# Patient Record
Sex: FEMALE | Race: WHITE | NOT HISPANIC OR LATINO | Employment: OTHER | URBAN - METROPOLITAN AREA
[De-identification: names, ages, dates, MRNs, and addresses within clinical notes are randomized per-mention and may not be internally consistent; named-entity substitution may affect disease eponyms.]

---

## 2020-02-03 ENCOUNTER — OFFICE VISIT (OUTPATIENT)
Dept: FAMILY MEDICINE CLINIC | Facility: CLINIC | Age: 75
End: 2020-02-03

## 2020-02-03 VITALS
SYSTOLIC BLOOD PRESSURE: 126 MMHG | OXYGEN SATURATION: 98 % | TEMPERATURE: 97.8 F | HEART RATE: 78 BPM | BODY MASS INDEX: 29.44 KG/M2 | HEIGHT: 62 IN | WEIGHT: 160 LBS | DIASTOLIC BLOOD PRESSURE: 78 MMHG

## 2020-02-03 DIAGNOSIS — I10 ESSENTIAL HYPERTENSION: ICD-10-CM

## 2020-02-03 DIAGNOSIS — E78.2 MIXED HYPERLIPIDEMIA: Primary | ICD-10-CM

## 2020-02-03 DIAGNOSIS — T14.8XXA BRUISE: ICD-10-CM

## 2020-02-03 DIAGNOSIS — E55.9 HYPOVITAMINOSIS D: ICD-10-CM

## 2020-02-03 PROBLEM — K58.9 ADAPTIVE COLITIS: Status: ACTIVE | Noted: 2020-02-03

## 2020-02-03 PROBLEM — M19.90 ARTHRITIS: Status: ACTIVE | Noted: 2018-12-03

## 2020-02-03 PROBLEM — L21.9 DERMATITIS SEBORRHEICA: Status: ACTIVE | Noted: 2020-02-03

## 2020-02-03 PROBLEM — F32.A DEPRESSION: Status: ACTIVE | Noted: 2018-12-03

## 2020-02-03 PROBLEM — R10.31 RIGHT GROIN PAIN: Status: ACTIVE | Noted: 2017-04-12

## 2020-02-03 PROBLEM — I67.1 BRAIN ANEURYSM: Status: ACTIVE | Noted: 2020-02-03

## 2020-02-03 PROBLEM — E78.5 HLD (HYPERLIPIDEMIA): Status: ACTIVE | Noted: 2020-02-03

## 2020-02-03 PROBLEM — I34.0 NONRHEUMATIC MITRAL VALVE REGURGITATION: Status: ACTIVE | Noted: 2020-02-03

## 2020-02-03 PROCEDURE — 99204 OFFICE O/P NEW MOD 45 MIN: CPT | Performed by: FAMILY MEDICINE

## 2020-02-03 RX ORDER — ATORVASTATIN CALCIUM 20 MG/1
TABLET, FILM COATED ORAL
COMMUNITY
Start: 2012-09-10 | End: 2020-02-03 | Stop reason: SDUPTHER

## 2020-02-03 RX ORDER — AMLODIPINE BESYLATE 10 MG/1
5 TABLET ORAL 2 TIMES DAILY
COMMUNITY
Start: 2018-12-07 | End: 2020-02-03 | Stop reason: SDUPTHER

## 2020-02-03 RX ORDER — SERTRALINE HYDROCHLORIDE 100 MG/1
TABLET, FILM COATED ORAL
COMMUNITY
Start: 2012-08-28 | End: 2020-09-15 | Stop reason: SDUPTHER

## 2020-02-03 RX ORDER — TRAZODONE HYDROCHLORIDE 100 MG/1
100 TABLET ORAL NIGHTLY
COMMUNITY
Start: 2019-09-18 | End: 2020-03-19 | Stop reason: SDUPTHER

## 2020-02-03 RX ORDER — ATORVASTATIN CALCIUM 20 MG/1
20 TABLET, FILM COATED ORAL DAILY
Qty: 90 TABLET | Refills: 3 | Status: SHIPPED | OUTPATIENT
Start: 2020-02-03 | End: 2021-03-11 | Stop reason: SDUPTHER

## 2020-02-03 RX ORDER — AMLODIPINE BESYLATE 10 MG/1
10 TABLET ORAL DAILY
Qty: 90 TABLET | Refills: 3 | Status: SHIPPED | OUTPATIENT
Start: 2020-02-03 | End: 2021-03-09 | Stop reason: SDUPTHER

## 2020-02-03 RX ORDER — LANOLIN ALCOHOL/MO/W.PET/CERES
1000 CREAM (GRAM) TOPICAL DAILY
COMMUNITY
End: 2021-06-03 | Stop reason: HOSPADM

## 2020-02-03 NOTE — PROGRESS NOTES
Joi Aleman is a 74 y.o. female.     Chief Complaint   Patient presents with   • Establish Care     new pt establishing today with dr araujo   • Knee Problem     left knee bruising for about a year she fell last year on the knee and since has some numbness and bruise       HPI     Pt is a pleasant 74 y.o. YO female here for L knee pain, fell 1 year ago and had bruising and was warm and numb for months.  A couple of episodes of instability.  Current medical problems includes HTN well controlled, HLD well controlled, Depression well controlled with zoloft.    The following portions of the patient's history were reviewed and updated as appropriate: allergies, current medications, past family history, past medical history, past social history, past surgical history and problem list.    Review of Systems   HENT: Negative.    Eyes: Negative.    Respiratory: Negative.    Cardiovascular: Negative for chest pain, palpitations and leg swelling.   Gastrointestinal: Negative.  Negative for abdominal distention, abdominal pain and anal bleeding.   Endocrine: Negative.    Genitourinary: Negative.    Musculoskeletal: Positive for arthralgias.        Right knee bruising    Skin: Negative.  Negative for color change, pallor, rash and wound.   Allergic/Immunologic: Negative for environmental allergies and food allergies.   Hematological: Negative for adenopathy. Does not bruise/bleed easily.   Psychiatric/Behavioral: Negative.  Negative for agitation, behavioral problems and sleep disturbance.       Objective  Vitals:    02/03/20 1417   BP: 126/78   Pulse: 78   Temp: 97.8 °F (36.6 °C)   SpO2: 98%        Physical Exam   Constitutional: She is oriented to person, place, and time. She appears well-developed and well-nourished. No distress.   HENT:   Head: Normocephalic.   Nose: Nose normal.   Eyes: EOM are normal.   Cardiovascular: Normal rate, regular rhythm and intact distal pulses.   Murmur heard.  Pulmonary/Chest: Effort  normal and breath sounds normal. No respiratory distress.   Musculoskeletal: Normal range of motion.   Neurological: She is alert and oriented to person, place, and time.   Skin: Skin is warm and dry. No rash noted.   Psychiatric: She has a normal mood and affect. Her behavior is normal. Judgment and thought content normal.   Nursing note and vitals reviewed.        Current Outpatient Medications:   •  amLODIPine (NORVASC) 10 MG tablet, Take 1 tablet by mouth Daily., Disp: 90 tablet, Rfl: 3  •  atorvastatin (LIPITOR) 20 MG tablet, Take 1 tablet by mouth Daily., Disp: 90 tablet, Rfl: 3  •  MAGNESIUM GLUCONATE PO, Take 27 mg by mouth 2 (Two) Times a Day., Disp: , Rfl:   •  MULTIPLE VITAMINS PO, Take  by mouth., Disp: , Rfl:   •  sertraline (ZOLOFT) 100 MG tablet, TAKE 2 TABLETS EVERY DAY, Disp: , Rfl:   •  traZODone (DESYREL) 100 MG tablet, 100 mg Every Night., Disp: , Rfl:   •  vitamin B-12 (CYANOCOBALAMIN) 1000 MCG tablet, Take 1,000 mcg by mouth Daily., Disp: , Rfl:     Procedures    Lab Results (most recent)     None              Joi was seen today for establish care and knee problem.    Diagnoses and all orders for this visit:    Mixed hyperlipidemia  -     atorvastatin (LIPITOR) 20 MG tablet; Take 1 tablet by mouth Daily.  -     Lipid Panel    Essential hypertension  -     amLODIPine (NORVASC) 10 MG tablet; Take 1 tablet by mouth Daily.  -     Comprehensive Metabolic Panel    Hypovitaminosis D  -     Vitamin D 25 Hydroxy    Bruise      Pleasant 74-year-old female here as a new patient.  Hyperlipidemia well-controlled on atorvastatin recheck levels as above and continue current dose unless labs are significantly different.    Hypertension well-controlled on amlodipine 10 mg daily.    Hypovitaminosis D stable.  Will recheck level today.    Depression well controlled on Zoloft 100 mg daily.  Not needing a refill at this time.    Patient had mechanical fall and bruised her knee, healing well.  Advised warm  compress and gradual increased activity as tolerated.  No other musculoskeletal injuries identified.    Return in about 6 months (around 8/3/2020), or if symptoms worsen or fail to improve, for Medicare Wellness.      Yaneth Leal MD

## 2020-03-06 LAB
25(OH)D3+25(OH)D2 SERPL-MCNC: 34.8 NG/ML (ref 30–100)
ALBUMIN SERPL-MCNC: 4.1 G/DL (ref 3.5–5.2)
ALBUMIN/GLOB SERPL: 2 G/DL
ALP SERPL-CCNC: 88 U/L (ref 39–117)
ALT SERPL-CCNC: 15 U/L (ref 1–33)
AST SERPL-CCNC: 20 U/L (ref 1–32)
BILIRUB SERPL-MCNC: 0.3 MG/DL (ref 0.2–1.2)
BUN SERPL-MCNC: 11 MG/DL (ref 8–23)
BUN/CREAT SERPL: 13.4 (ref 7–25)
CALCIUM SERPL-MCNC: 9.1 MG/DL (ref 8.6–10.5)
CHLORIDE SERPL-SCNC: 100 MMOL/L (ref 98–107)
CHOLEST SERPL-MCNC: 209 MG/DL (ref 0–200)
CO2 SERPL-SCNC: 28.9 MMOL/L (ref 22–29)
CREAT SERPL-MCNC: 0.82 MG/DL (ref 0.57–1)
GLOBULIN SER CALC-MCNC: 2.1 GM/DL
GLUCOSE SERPL-MCNC: 93 MG/DL (ref 65–99)
HDLC SERPL-MCNC: 89 MG/DL (ref 40–60)
LDLC SERPL CALC-MCNC: 107 MG/DL (ref 0–100)
POTASSIUM SERPL-SCNC: 4.5 MMOL/L (ref 3.5–5.2)
PROT SERPL-MCNC: 6.2 G/DL (ref 6–8.5)
SODIUM SERPL-SCNC: 140 MMOL/L (ref 136–145)
TRIGL SERPL-MCNC: 66 MG/DL (ref 0–150)
VLDLC SERPL CALC-MCNC: 13.2 MG/DL

## 2020-03-19 RX ORDER — TRAZODONE HYDROCHLORIDE 100 MG/1
100 TABLET ORAL NIGHTLY
Qty: 90 TABLET | Refills: 2 | Status: SHIPPED | OUTPATIENT
Start: 2020-03-19 | End: 2020-05-10 | Stop reason: SDUPTHER

## 2020-04-21 ENCOUNTER — APPOINTMENT (OUTPATIENT)
Dept: GENERAL RADIOLOGY | Facility: HOSPITAL | Age: 75
End: 2020-04-21

## 2020-04-21 ENCOUNTER — HOSPITAL ENCOUNTER (EMERGENCY)
Facility: HOSPITAL | Age: 75
Discharge: HOME | End: 2020-04-21
Attending: EMERGENCY MEDICINE | Admitting: EMERGENCY MEDICINE

## 2020-04-21 VITALS
OXYGEN SATURATION: 96 % | TEMPERATURE: 99.1 F | RESPIRATION RATE: 18 BRPM | HEIGHT: 62 IN | WEIGHT: 163 LBS | BODY MASS INDEX: 30 KG/M2 | DIASTOLIC BLOOD PRESSURE: 71 MMHG | HEART RATE: 51 BPM | SYSTOLIC BLOOD PRESSURE: 153 MMHG

## 2020-04-21 DIAGNOSIS — R42 DIZZY SPELLS: Primary | ICD-10-CM

## 2020-04-21 LAB
ALBUMIN SERPL-MCNC: 3.7 G/DL (ref 3.5–5.2)
ALBUMIN/GLOB SERPL: 1.7 G/DL
ALP SERPL-CCNC: 75 U/L (ref 39–117)
ALT SERPL W P-5'-P-CCNC: 14 U/L (ref 1–33)
ANION GAP SERPL CALCULATED.3IONS-SCNC: 9.9 MMOL/L (ref 5–15)
AST SERPL-CCNC: 18 U/L (ref 1–32)
BASOPHILS # BLD AUTO: 0.05 10*3/MM3 (ref 0–0.2)
BASOPHILS NFR BLD AUTO: 0.8 % (ref 0–1.5)
BILIRUB SERPL-MCNC: 0.3 MG/DL (ref 0.2–1.2)
BUN BLD-MCNC: 11 MG/DL (ref 8–23)
BUN/CREAT SERPL: 12 (ref 7–25)
CALCIUM SPEC-SCNC: 9.1 MG/DL (ref 8.6–10.5)
CHLORIDE SERPL-SCNC: 102 MMOL/L (ref 98–107)
CO2 SERPL-SCNC: 29.1 MMOL/L (ref 22–29)
CREAT BLD-MCNC: 0.92 MG/DL (ref 0.57–1)
DEPRECATED RDW RBC AUTO: 42.2 FL (ref 37–54)
EOSINOPHIL # BLD AUTO: 0.19 10*3/MM3 (ref 0–0.4)
EOSINOPHIL NFR BLD AUTO: 3.2 % (ref 0.3–6.2)
ERYTHROCYTE [DISTWIDTH] IN BLOOD BY AUTOMATED COUNT: 13 % (ref 12.3–15.4)
GFR SERPL CREATININE-BSD FRML MDRD: 60 ML/MIN/1.73
GLOBULIN UR ELPH-MCNC: 2.2 GM/DL
GLUCOSE BLD-MCNC: 130 MG/DL (ref 65–99)
HCT VFR BLD AUTO: 35 % (ref 34–46.6)
HGB BLD-MCNC: 12.2 G/DL (ref 12–15.9)
IMM GRANULOCYTES # BLD AUTO: 0.01 10*3/MM3 (ref 0–0.05)
IMM GRANULOCYTES NFR BLD AUTO: 0.2 % (ref 0–0.5)
LYMPHOCYTES # BLD AUTO: 1.48 10*3/MM3 (ref 0.7–3.1)
LYMPHOCYTES NFR BLD AUTO: 24.6 % (ref 19.6–45.3)
MAGNESIUM SERPL-MCNC: 2 MG/DL (ref 1.6–2.4)
MCH RBC QN AUTO: 31.2 PG (ref 26.6–33)
MCHC RBC AUTO-ENTMCNC: 34.9 G/DL (ref 31.5–35.7)
MCV RBC AUTO: 89.5 FL (ref 79–97)
MONOCYTES # BLD AUTO: 0.55 10*3/MM3 (ref 0.1–0.9)
MONOCYTES NFR BLD AUTO: 9.2 % (ref 5–12)
NEUTROPHILS # BLD AUTO: 3.73 10*3/MM3 (ref 1.7–7)
NEUTROPHILS NFR BLD AUTO: 62 % (ref 42.7–76)
NRBC BLD AUTO-RTO: 0 /100 WBC (ref 0–0.2)
NT-PROBNP SERPL-MCNC: 306.8 PG/ML (ref 5–900)
PLATELET # BLD AUTO: 196 10*3/MM3 (ref 140–450)
PMV BLD AUTO: 9 FL (ref 6–12)
POTASSIUM BLD-SCNC: 4.1 MMOL/L (ref 3.5–5.2)
PROT SERPL-MCNC: 5.9 G/DL (ref 6–8.5)
RBC # BLD AUTO: 3.91 10*6/MM3 (ref 3.77–5.28)
SODIUM BLD-SCNC: 141 MMOL/L (ref 136–145)
TROPONIN T SERPL-MCNC: <0.01 NG/ML (ref 0–0.03)
WBC NRBC COR # BLD: 6.01 10*3/MM3 (ref 3.4–10.8)

## 2020-04-21 PROCEDURE — 93010 ELECTROCARDIOGRAM REPORT: CPT | Performed by: INTERNAL MEDICINE

## 2020-04-21 PROCEDURE — 96360 HYDRATION IV INFUSION INIT: CPT

## 2020-04-21 PROCEDURE — 99284 EMERGENCY DEPT VISIT MOD MDM: CPT

## 2020-04-21 PROCEDURE — 83735 ASSAY OF MAGNESIUM: CPT | Performed by: EMERGENCY MEDICINE

## 2020-04-21 PROCEDURE — 85025 COMPLETE CBC W/AUTO DIFF WBC: CPT | Performed by: EMERGENCY MEDICINE

## 2020-04-21 PROCEDURE — 71045 X-RAY EXAM CHEST 1 VIEW: CPT

## 2020-04-21 PROCEDURE — 84484 ASSAY OF TROPONIN QUANT: CPT | Performed by: EMERGENCY MEDICINE

## 2020-04-21 PROCEDURE — 83880 ASSAY OF NATRIURETIC PEPTIDE: CPT | Performed by: EMERGENCY MEDICINE

## 2020-04-21 PROCEDURE — 93005 ELECTROCARDIOGRAM TRACING: CPT | Performed by: EMERGENCY MEDICINE

## 2020-04-21 PROCEDURE — 80053 COMPREHEN METABOLIC PANEL: CPT | Performed by: EMERGENCY MEDICINE

## 2020-04-21 RX ADMIN — SODIUM CHLORIDE 500 ML: 9 INJECTION, SOLUTION INTRAVENOUS at 14:24

## 2020-04-21 NOTE — ED PROVIDER NOTES
EMERGENCY DEPARTMENT ENCOUNTER    Room Number:  21/21  Date of encounter:  4/21/2020  PCP: Yaneth Leal MD    HPI:  Context: Joi Aleman is a 74 y.o. female who presents to the ED c/o chief complaint of dizziness.  History provided by patient.  Patient complains of dizziness that has been occurring for the last 2 years.  Patient states dizziness occurs when standing, is improved with sitting.  Patient states that is worsened over the last 3 days.  Patient denies any associated chest pain, no palpitations.  Patient does endorse dyspnea that has been occurring for the last 2 years but no dyspnea specifically associated with dizziness episodes.  Patient states she is sometimes short of breath when standing still, but this is improved with movement.  Patient has no shortness of breath when seated or laying flat.  Patient denies any recent vomiting or diarrhea.  No fever shakes chills or night sweats.  Patient is on antihypertensive medicine, no recent changes.  Patient states she has not followed up with her primary care doctor regarding the symptoms.  Patient does report she has had multiple negative stress tests in the past.  Patient denies any recent fevers, cough, shortness of breath, lost of taste or smell.  Patient denies any recent travel.  No exposure to any known or suspected CoVID-19 infections.      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   • Essential hypertension 12/03/2018   • HLD (hyperlipidemia) 02/03/2020   • Pulmonary nodule 12/08/2016   • Right groin pain 04/12/2017   • Brain aneurysm 02/03/2020   • Nonrheumatic mitral valve regurgitation 02/03/2020     Resolved Ambulatory Problems     Diagnosis Date Noted   • No Resolved Ambulatory Problems     Past Medical History:    Diagnosis Date   • Anxiety    • Hyperlipidemia    • Hypertension    • Insomnia        PAST SURGICAL HISTORY  Past Surgical History:   Procedure Laterality Date   • BLEPHAROPLASTY     • COLON SURGERY     • COLONOSCOPY      normal less than 10 years ago   • HYSTERECTOMY     • TOE SURGERY Right     right big toe replecemnet 12 years ago       FAMILY HISTORY  Family History   Problem Relation Age of Onset   • Breast cancer Mother 76   • Heart disease Father    • Heart attack Father    • Heart disease Paternal Grandfather    • Heart attack Paternal Grandfather    • No Known Problems Sister    • No Known Problems Brother    • Prostate cancer Maternal Grandfather        SOCIAL HISTORY  Social History     Socioeconomic History   • Marital status: Single     Spouse name: Not on file   • Number of children: Not on file   • Years of education: Not on file   • Highest education level: Not on file   Tobacco Use   • Smoking status: Former Smoker     Packs/day: 1.50     Types: Cigarettes     Last attempt to quit: 1975     Years since quittin.3   • Smokeless tobacco: Never Used   Substance and Sexual Activity   • Alcohol use: Yes     Frequency: 2-4 times a month   • Drug use: Never       ALLERGIES  Patient has no known allergies.    The patient's allergies have been reviewed    REVIEW OF SYSTEMS  All systems reviewed and negative except for those discussed in HPI.     PHYSICAL EXAM  I have reviewed the triage vital signs and nursing notes.  ED Triage Vitals [20 1330]   Temp Heart Rate Resp BP SpO2   99.1 °F (37.3 °C) 79 18 -- 96 %      Temp src Heart Rate Source Patient Position BP Location FiO2 (%)   -- -- -- -- --       GENERAL: No acute distress  HENT: NCAT, PERRL, Nares patent  EYES: no scleral icterus  NECK: trachea midline, no ROM limitations  CV: regular rhythm, regular rate, systolic murmur loudest at the right sternal border  RESPIRATORY: normal effort  ABDOMEN: soft  : deferred  MUSCULOSKELETAL: no  deformity  NEURO: Alert and oriented x3, extraocular motion intact, pupils are equal and round reactive to light, cranial nerves II through XII are grossly intact, normal speech, moves all extremities well, 5 out of 5 strength all 4 extremities, sensation intact light touch all 4 extremities, no ataxia  SKIN: warm, dry    LAB RESULTS  Recent Results (from the past 24 hour(s))   Comprehensive Metabolic Panel    Collection Time: 04/21/20  1:59 PM   Result Value Ref Range    Glucose 130 (H) 65 - 99 mg/dL    BUN 11 8 - 23 mg/dL    Creatinine 0.92 0.57 - 1.00 mg/dL    Sodium 141 136 - 145 mmol/L    Potassium 4.1 3.5 - 5.2 mmol/L    Chloride 102 98 - 107 mmol/L    CO2 29.1 (H) 22.0 - 29.0 mmol/L    Calcium 9.1 8.6 - 10.5 mg/dL    Total Protein 5.9 (L) 6.0 - 8.5 g/dL    Albumin 3.70 3.50 - 5.20 g/dL    ALT (SGPT) 14 1 - 33 U/L    AST (SGOT) 18 1 - 32 U/L    Alkaline Phosphatase 75 39 - 117 U/L    Total Bilirubin 0.3 0.2 - 1.2 mg/dL    eGFR Non African Amer 60 (L) >60 mL/min/1.73    Globulin 2.2 gm/dL    A/G Ratio 1.7 g/dL    BUN/Creatinine Ratio 12.0 7.0 - 25.0    Anion Gap 9.9 5.0 - 15.0 mmol/L   BNP    Collection Time: 04/21/20  1:59 PM   Result Value Ref Range    proBNP 306.8 5.0 - 900.0 pg/mL   Troponin    Collection Time: 04/21/20  1:59 PM   Result Value Ref Range    Troponin T <0.010 0.000 - 0.030 ng/mL   Magnesium    Collection Time: 04/21/20  1:59 PM   Result Value Ref Range    Magnesium 2.0 1.6 - 2.4 mg/dL   CBC Auto Differential    Collection Time: 04/21/20  1:59 PM   Result Value Ref Range    WBC 6.01 3.40 - 10.80 10*3/mm3    RBC 3.91 3.77 - 5.28 10*6/mm3    Hemoglobin 12.2 12.0 - 15.9 g/dL    Hematocrit 35.0 34.0 - 46.6 %    MCV 89.5 79.0 - 97.0 fL    MCH 31.2 26.6 - 33.0 pg    MCHC 34.9 31.5 - 35.7 g/dL    RDW 13.0 12.3 - 15.4 %    RDW-SD 42.2 37.0 - 54.0 fl    MPV 9.0 6.0 - 12.0 fL    Platelets 196 140 - 450 10*3/mm3    Neutrophil % 62.0 42.7 - 76.0 %    Lymphocyte % 24.6 19.6 - 45.3 %    Monocyte % 9.2 5.0 -  12.0 %    Eosinophil % 3.2 0.3 - 6.2 %    Basophil % 0.8 0.0 - 1.5 %    Immature Grans % 0.2 0.0 - 0.5 %    Neutrophils, Absolute 3.73 1.70 - 7.00 10*3/mm3    Lymphocytes, Absolute 1.48 0.70 - 3.10 10*3/mm3    Monocytes, Absolute 0.55 0.10 - 0.90 10*3/mm3    Eosinophils, Absolute 0.19 0.00 - 0.40 10*3/mm3    Basophils, Absolute 0.05 0.00 - 0.20 10*3/mm3    Immature Grans, Absolute 0.01 0.00 - 0.05 10*3/mm3    nRBC 0.0 0.0 - 0.2 /100 WBC       I ordered the above labs and reviewed the results.    RADIOLOGY  Xr Chest 1 View    Result Date: 4/21/2020  XR CHEST 1 VW-  HISTORY: Female who is 74 years-old,  dizziness  TECHNIQUE: Frontal view of the chest  COMPARISON: None available  FINDINGS: The heart is enlarged. Aorta is ectatic. Mild central vascular prominence. No focal pulmonary consolidation, pleural effusion, or pneumothorax. No acute osseous process.      No focal pulmonary consolidation. Cardiomegaly with mild central vascular prominence. Ectatic aorta.  This report was finalized on 4/21/2020 2:11 PM by Dr. Brannon Ramsay M.D.        I ordered the above noted radiological studies. I reviewed the images and results. I agree with the radiologist interpretation.    PROCEDURES  Procedures    MEDICATIONS GIVEN IN ER  Medications   sodium chloride 0.9 % bolus 500 mL (500 mL Intravenous New Bag 4/21/20 1424)       PROGRESS, DATA ANALYSIS, CONSULTS, AND MEDICAL DECISION MAKING  A complete history and physical exam have been performed.  All available laboratory and imaging results have been reviewed by myself prior to disposition.  Face mask and gloves were worn throughout the patient encounter, unless additional PPE was worn and specified below. Hand hygiene was performed before entering and after leaving the patient room.  [facility]    ED Course as of Apr 21 1453   Tue Apr 21, 2020   1340 Discussed pertinent information from history and physical exam with patient.  Discussed differential diagnosis.  Discussed plan for ED  evaluation/work-up/treatment.  All questions answered.  Patient is agreeable with plan.        [JG]   1351 Patient has cardiac murmur, states she is aware of it has been evaluated in the past.  No stress test or echo available for review.    [JG]   1354 Record review: Last echo in [facility] 6/7/2016.  Echo showed normal systolic function, trace aortic regurgitation, mild mitral regurgitation, mild tricuspid regurgitation, borderline pulmonary hypertension.    [JG]   1419 EKG independently viewed and contemporaneously interpreted by ED physician. Time: 1403.  Rate 58.  Interpretation: Normal sinus rhythm, normal axis, first-degree AV block, normal QRS, no acute ST changes.    [JG]   1423 Orthostatic negative.    [JG]   1450 The patient was reexamined.  They have had symptomatic improvement during their ED stay.  I discussed today's findings with the patient, explaining the pertinent positives and negatives from today's visit, and the plan of care.  Discussed plan for discharge as there is no emergent indication for admission.  Discussed limitation of the ED work-up and that this is to rule out life-threatening emergencies but that they could require further testing as determined by their primary care and or any referred specialist patient is agreeable and understands need for follow-up and repeat exam/testing.  Patient is aware that discharge does not mean there is nothing wrong, indicates no emergency is present, and that they must continue their care with their primary care physician and/or any referred specialist.  They were given appropriate follow-up with their primary care physician and/or specialist.  I had an extensive discussion on the expected clinical course and return precautions.  Patient understands to return to the emergency department for continuation, worsening, or new symptoms.  I answered any of the patient's questions. Patient was discharged home in a stable condition.        [JG]      ED  Course User Index  [JG] Patrick Romero MD       AS OF 14:53 VITALS:    BP - 137/88  HR - 57  TEMP - 99.1 °F (37.3 °C)  O2 SATS - 97%    DIAGNOSIS  Final diagnoses:   Dizzy spells         DISPOSITION  DISCHARGE    Patient discharged in stable condition.    Reviewed implications of results, diagnosis, meds, responsibility to follow up, warning signs and symptoms of possible worsening, potential complications and reasons to return to ER.    Patient/Family voiced understanding of above instructions.    Discussed plan for discharge, as there is no emergent indication for admission. Patient referred to primary care provider for BP management due to today's BP. Pt/family is agreeable and understands need for follow up and repeat testing.  Pt is aware that discharge does not mean that nothing is wrong but it indicates no emergency is present that requires admission and they must continue care with follow-up as given below or physician of their choice.     FOLLOW-UP  Yaneth Leal MD  [address] [facility] [id number] [phone]    Schedule an appointment as soon as possible for a visit in 2 days  even if well    your cardiologist    Schedule an appointment as soon as possible for a visit in 2 days      Belia Durán MD  [address] [id number] [phone]    Schedule an appointment as soon as possible for a visit   if you are unable to follow up with your cardiologist         Medication List      No changes were made to your prescriptions during this visit.        Patrick Romero MD  04/21/20 [id number]

## 2020-04-21 NOTE — ED NOTES
"Pt comes to ER for near syncopal episodes x2 years but worsening over the past 3 days. Pt states she will get \"really dizzy and feel like I'm going to pass out so I have to sit down.\" Pt states she is fine upon initially standing but is worse with exertion. Denies any BP medication changes     Marcelle Villarreal RN  04/21/20 [id number]    "

## 2020-04-23 NOTE — PROGRESS NOTES
RM:________    Referral Provider: No ref. provider found Yaneth Leal MD    PREVIOUS CARDIOLOGIST:   CARDIAC TESTING:     : 1945   AGE: 74 y.o.    2020  REASON FOR VISIT/  CC: ER FOLLOW UP 20      WT: ____________ BP: __________L __________R/ HR_______________    CHEST PAIN: _____________    SOA: ____________PALPS: __________      LIGHTHEADED: ___________ FATIGUE: _______________ EDEMA______________  ALLERGIES:  Patient has no known allergies.  SMOKING HISTORY  Social History     Tobacco Use   • Smoking status: Former Smoker     Packs/day: 1.50     Types: Cigarettes     Last attempt to quit: 1975     Years since quittin.3   • Smokeless tobacco: Never Used   Substance Use Topics   • Alcohol use: Yes     Frequency: 2-4 times a month   • Drug use: Never     Single     CHILDREN: _______________________       CAFFEINE USE________  ALCOHOL _____________  OCCUPATION_____________  Past Surgical History:   Procedure Laterality Date   • BLEPHAROPLASTY     • COLON SURGERY     • COLONOSCOPY      normal less than 10 years ago   • HYSTERECTOMY     • TOE SURGERY Right     right big toe replecemnet 12 years ago          FAMILY HISTORY  HEART DISEASE  CHF  DIABETES  CARDIAC ARREST  STROKE  CANCER  ANEURYSM                                                                                                                     Past Medical History:   Diagnosis Date   • Anxiety    • Arthritis    • Brain aneurysm    • Depression    • Hyperlipidemia    • Hypertension    • Insomnia          H/O: MI_____   STROKE________   GOUT_____   ANEMIA______     CAROTID________ HIV____ CAD_______ HYPERCHOL _____    H/O: CHF _____   RF____ DM___ HTN_______PVD____THYROID DISEASE_______    PMH: GI ____   HEPATITIS ___ KIDNEY DISEASE ___ LUNG DISEASE _______     SLEEP APNEA ____ BLOOD CLOTS ____ DVT ____ VEIN STRIPPING ___________     CANCER _________________________________ CHEMO/ RADIATION__________

## 2020-04-23 NOTE — PROGRESS NOTES
Date of Office Visit: 20  Encounter Provider: Thomas Estrada MD  Place of Service: [facility] CARDIOLOGY  Patient Name: Joi Aleman  :1945    Chief Complaint   Patient presents with   • Chest Pain   :     HPI:     Ms. Aleman is 74 y.o. and presents today to be evaluated after a recent ED visit.  She has hypertension, treated with amlodipine.  She has a history of a heart murmur and had an essentially normal echo at [place] in 2016. She had a normal treadmill stress test in .  She had a normal perfusion stress at [place] in 2016.      The primary reason for her ED visit was dizziness.  Her labs and EKG were unremarkable.  She was not orthostatic.      For the last 4-5 years, she's had progressive symptoms.  She reports generalized fatigue that is severe. She feels weak after she's been standing for a while.  She does not feel lightheaded; she just feels so weak all over that she could collapse. She reports one episode of chest discomfort that felt vice-like, and occurred at rest.  She has not had any exertional chest pain.  She reports dyspnea with exertion.  She denies lightheadedness or syncope.  She denies PND or orthopnea.  She denies leg swelling or palpitations.     She smoked in the remote past but quit in .  Her father had some type of heart disease but she doesn't know the details; he  at 72.      Past Medical History:   Diagnosis Date   • Anxiety    • Arthritis    • Brain aneurysm    • Depression    • Hyperlipidemia    • Hypertension    • Insomnia        Past Surgical History:   Procedure Laterality Date   • BLEPHAROPLASTY     • CARPAL TUNNEL RELEASE Right    • COLON SURGERY     • COLONOSCOPY      normal less than 10 years ago   • HYSTERECTOMY     • TOE SURGERY Right     right big toe replecemnet 12 years ago   • TONSILLECTOMY         Social History     Socioeconomic History   • Marital status: Single     Spouse name: Not on file   • Number of  children: 2   • Years of education: Not on file   • Highest education level: Not on file   Occupational History   • Occupation: retired    • Occupation: property closing    Tobacco Use   • Smoking status: Former Smoker     Packs/day: 1.50     Types: Cigarettes     Last attempt to quit:      Years since quittin.3   • Smokeless tobacco: Never Used   Substance and Sexual Activity   • Alcohol use: Yes     Frequency: 2-4 times a month   • Drug use: Never     Family History   Problem Relation Age of Onset   • Breast cancer Mother 76   • Heart disease Father    • Heart attack Father    • Heart disease Paternal Grandfather    • Heart attack Paternal Grandfather    • No Known Problems Sister    • No Known Problems Brother    • Prostate cancer Maternal Grandfather    • Heart disease Sister      Review of Systems   HENT: Negative for ear pain and sore throat.    Cardiovascular: Positive for chest pain. Negative for leg swelling, paroxysmal nocturnal dyspnea and syncope.   Respiratory: Negative for hemoptysis and sputum production.    Skin: Negative for rash.   Musculoskeletal: Negative for neck pain.   Gastrointestinal: Negative for abdominal pain and vomiting.   Neurological: Negative for headaches.     No Known Allergies      Current Outpatient Medications:   •  amLODIPine (NORVASC) 10 MG tablet, Take 1 tablet by mouth Daily., Disp: 90 tablet, Rfl: 3  •  atorvastatin (LIPITOR) 20 MG tablet, Take 1 tablet by mouth Daily., Disp: 90 tablet, Rfl: 3  •  MAGNESIUM GLUCONATE PO, Take 27 mg by mouth 2 (Two) Times a Day., Disp: , Rfl:   •  MULTIPLE VITAMINS PO, Take  by mouth., Disp: , Rfl:   •  sertraline (ZOLOFT) 100 MG tablet, TAKE 2 TABLETS EVERY DAY, Disp: , Rfl:   •  traZODone (DESYREL) 100 MG tablet, Take 1 tablet by mouth Every Night., Disp: 90 tablet, Rfl: 2  •  vitamin B-12 (CYANOCOBALAMIN) 1000 MCG tablet, Take 1,000 mcg by mouth Daily., Disp: , Rfl:       Objective:     Vitals:    20 1122   BP: 136/68   BP  "Location: Left arm   Pulse: 51   Weight: 75.3 kg (166 lb)   Height: 157.5 cm (62\")     Body mass index is 30.36 kg/m².    Physical Exam   Constitutional: She is oriented to person, place, and time. She appears well-developed and well-nourished.   HENT:   Head: Normocephalic.   Nose: Nose normal.   Mouth/Throat: Oropharynx is clear and moist.   Eyes: Pupils are equal, round, and reactive to light. Conjunctivae and EOM are normal.   Neck: Normal range of motion. No JVD present.   Cardiovascular: Regular rhythm, normal heart sounds and intact distal pulses. Bradycardia present.   No murmur heard.  Pulmonary/Chest: Effort normal and breath sounds normal.   Abdominal: Soft. There is no tenderness.   Musculoskeletal: Normal range of motion. She exhibits no edema.   Lymphadenopathy:     She has no cervical adenopathy.   Neurological: She is alert and oriented to person, place, and time. No cranial nerve deficit.   Skin: Skin is warm and dry. No rash noted.   Psychiatric: She has a normal mood and affect. Her behavior is normal. Judgment and thought content normal.   Vitals reviewed.        ECG 12 Lead  Date/Time: 4/27/2020 11:29 AM  Performed by: Thomas Estrada MD  Authorized by: Thomas Estrada MD   Comparison: compared with previous ECG   Similar to previous ECG  Rhythm: sinus rhythm  Conduction: conduction normal  ST Segments: ST segments normal  T Waves: T waves normal  QRS axis: normal  Other: no other findings    Clinical impression: normal ECG            Assessment:       Diagnosis Plan   1. Generalized weakness     2. Fatigue, unspecified type  Adult Transthoracic Echo Complete W/ Cont if Necessary Per Protocol   3. Chest discomfort  Adult Transthoracic Echo Complete W/ Cont if Necessary Per Protocol   4. Shortness of breath  Adult Transthoracic Echo Complete W/ Cont if Necessary Per Protocol   5. Essential hypertension  ECG 12 Lead   6. Heart murmur  Adult Transesophageal Echo (RICARDA) W/ Cont if Necessary Per " Protocol   7. Abnormal findings on diagnostic imaging of heart and coronary circulation   Adult Transesophageal Echo (RICARDA) W/ Cont if Necessary Per Protocol        Plan:       Ms Aleman has a significant murmur suggestive of mitral regurgitation.  She reports nearly debilitating symptoms of exertional fatigue and dyspnea.  I got a TTE on her in the office; all chamber sizes are normal and she has normal left atrial volume.  In certain views, the mitral valve appears normal.  Then, in others, I can see what appears to be a torn chord and potentially a prolapsing segment of the posterior leaflet.  There's mild-moderate mitral regurgitation, but this could be underestimated.      Given the severity of her symptoms, and the prominence of her murmur without a clear diagnosis by TTE, I think we should proceed with a RICARDA.  If that confirms a diagnosis of MVP/MR, she'll need a right/left heart cath.  If it doesn't, then I will get a CTA of the chest to exclude a thoracic AVM.     Sincerely,       Thomas Estrada MD                Answers for HPI/ROS submitted by the patient on 4/23/2020   Shortness of breath  What is the primary reason for your visit?: Shortness of Breath  Chronicity: chronic  Onset: more than 1 year ago  Frequency: every few minutes  Progression since onset: gradually worsening  Episode duration: 10 minutes  coryza: No  Aggravating factors: any activity

## 2020-04-27 ENCOUNTER — HOSPITAL ENCOUNTER (OUTPATIENT)
Dept: CARDIOLOGY | Facility: HOSPITAL | Age: 75
Discharge: HOME | End: 2020-04-27
Admitting: INTERNAL MEDICINE

## 2020-04-27 ENCOUNTER — TELEPHONE (OUTPATIENT)
Dept: CARDIOLOGY | Facility: CLINIC | Age: 75
End: 2020-04-27

## 2020-04-27 ENCOUNTER — OFFICE VISIT (OUTPATIENT)
Dept: CARDIOLOGY | Facility: CLINIC | Age: 75
End: 2020-04-27

## 2020-04-27 VITALS
OXYGEN SATURATION: 93 % | BODY MASS INDEX: 30.55 KG/M2 | HEART RATE: 71 BPM | WEIGHT: 166 LBS | HEIGHT: 62 IN | SYSTOLIC BLOOD PRESSURE: 138 MMHG | DIASTOLIC BLOOD PRESSURE: 68 MMHG

## 2020-04-27 VITALS
WEIGHT: 166 LBS | SYSTOLIC BLOOD PRESSURE: 136 MMHG | DIASTOLIC BLOOD PRESSURE: 68 MMHG | BODY MASS INDEX: 30.55 KG/M2 | HEIGHT: 62 IN | HEART RATE: 51 BPM

## 2020-04-27 DIAGNOSIS — R53.83 FATIGUE, UNSPECIFIED TYPE: ICD-10-CM

## 2020-04-27 DIAGNOSIS — R93.1 ABNORMAL FINDINGS ON DIAGNOSTIC IMAGING OF HEART AND CORONARY CIRCULATION: ICD-10-CM

## 2020-04-27 DIAGNOSIS — R01.1 HEART MURMUR: ICD-10-CM

## 2020-04-27 DIAGNOSIS — R06.02 SHORTNESS OF BREATH: ICD-10-CM

## 2020-04-27 DIAGNOSIS — R07.89 CHEST DISCOMFORT: ICD-10-CM

## 2020-04-27 DIAGNOSIS — I10 ESSENTIAL HYPERTENSION: ICD-10-CM

## 2020-04-27 DIAGNOSIS — R53.1 GENERALIZED WEAKNESS: Primary | ICD-10-CM

## 2020-04-27 PROBLEM — I67.1 BRAIN ANEURYSM: Status: RESOLVED | Noted: 2020-02-03 | Resolved: 2020-04-27

## 2020-04-27 LAB
AORTIC ARCH: 2.6 CM
AORTIC ROOT ANNULUS: 1.9 CM
ASCENDING AORTA: 2.9 CM
BH CV ECHO MEAS - ACS: 1.7 CM
BH CV ECHO MEAS - AI DEC SLOPE: 152.7 CM/SEC^2
BH CV ECHO MEAS - AI MAX PG: 58.7 MMHG
BH CV ECHO MEAS - AI MAX VEL: 382.9 CM/SEC
BH CV ECHO MEAS - AI P1/2T: 734.7 MSEC
BH CV ECHO MEAS - AO MAX PG (FULL): 7.8 MMHG
BH CV ECHO MEAS - AO MAX PG: 16 MMHG
BH CV ECHO MEAS - AO MEAN PG (FULL): 3.8 MMHG
BH CV ECHO MEAS - AO MEAN PG: 9 MMHG
BH CV ECHO MEAS - AO ROOT AREA (BSA CORRECTED): 1.5
BH CV ECHO MEAS - AO ROOT AREA: 5.7 CM^2
BH CV ECHO MEAS - AO ROOT DIAM: 2.7 CM
BH CV ECHO MEAS - AO V2 MAX: 200 CM/SEC
BH CV ECHO MEAS - AO V2 MEAN: 141.8 CM/SEC
BH CV ECHO MEAS - AO V2 VTI: 52.9 CM
BH CV ECHO MEAS - ASC AORTA: 2.9 CM
BH CV ECHO MEAS - AVA(I,A): 2.3 CM^2
BH CV ECHO MEAS - AVA(I,D): 2.3 CM^2
BH CV ECHO MEAS - AVA(V,A): 2.2 CM^2
BH CV ECHO MEAS - AVA(V,D): 2.2 CM^2
BH CV ECHO MEAS - BSA(HAYCOCK): 1.8 M^2
BH CV ECHO MEAS - BSA: 1.8 M^2
BH CV ECHO MEAS - BZI_BMI: 30.4 KILOGRAMS/M^2
BH CV ECHO MEAS - BZI_METRIC_HEIGHT: 157.5 CM
BH CV ECHO MEAS - BZI_METRIC_WEIGHT: 75.3 KG
BH CV ECHO MEAS - EDV(MOD-SP2): 70 ML
BH CV ECHO MEAS - EDV(MOD-SP4): 51 ML
BH CV ECHO MEAS - EDV(TEICH): 77.3 ML
BH CV ECHO MEAS - EF(CUBED): 78.3 %
BH CV ECHO MEAS - EF(MOD-BP): 71 %
BH CV ECHO MEAS - EF(MOD-SP2): 71.4 %
BH CV ECHO MEAS - EF(MOD-SP4): 72.5 %
BH CV ECHO MEAS - EF(TEICH): 70.9 %
BH CV ECHO MEAS - ESV(MOD-SP2): 20 ML
BH CV ECHO MEAS - ESV(MOD-SP4): 14 ML
BH CV ECHO MEAS - ESV(TEICH): 22.5 ML
BH CV ECHO MEAS - FS: 39.9 %
BH CV ECHO MEAS - IVS/LVPW: 0.98
BH CV ECHO MEAS - IVSD: 1.2 CM
BH CV ECHO MEAS - LAT PEAK E' VEL: 8 CM/SEC
BH CV ECHO MEAS - LV DIASTOLIC VOL/BSA (35-75): 28.9 ML/M^2
BH CV ECHO MEAS - LV MASS(C)D: 177.7 GRAMS
BH CV ECHO MEAS - LV MASS(C)DI: 100.6 GRAMS/M^2
BH CV ECHO MEAS - LV MAX PG: 8.3 MMHG
BH CV ECHO MEAS - LV MEAN PG: 5.2 MMHG
BH CV ECHO MEAS - LV SYSTOLIC VOL/BSA (12-30): 7.9 ML/M^2
BH CV ECHO MEAS - LV V1 MAX: 143.7 CM/SEC
BH CV ECHO MEAS - LV V1 MEAN: 108.7 CM/SEC
BH CV ECHO MEAS - LV V1 VTI: 39.1 CM
BH CV ECHO MEAS - LVIDD: 4.2 CM
BH CV ECHO MEAS - LVIDS: 2.5 CM
BH CV ECHO MEAS - LVLD AP2: 7.7 CM
BH CV ECHO MEAS - LVLD AP4: 6.7 CM
BH CV ECHO MEAS - LVLS AP2: 5.9 CM
BH CV ECHO MEAS - LVLS AP4: 5.2 CM
BH CV ECHO MEAS - LVOT AREA (M): 3.1 CM^2
BH CV ECHO MEAS - LVOT AREA: 3.1 CM^2
BH CV ECHO MEAS - LVOT DIAM: 2 CM
BH CV ECHO MEAS - LVPWD: 1.2 CM
BH CV ECHO MEAS - MED PEAK E' VEL: 5 CM/SEC
BH CV ECHO MEAS - MR MAX PG: 59.2 MMHG
BH CV ECHO MEAS - MR MAX VEL: 384.8 CM/SEC
BH CV ECHO MEAS - MV A DUR: 0.11 SEC
BH CV ECHO MEAS - MV A MAX VEL: 115.7 CM/SEC
BH CV ECHO MEAS - MV DEC SLOPE: 433.7 CM/SEC^2
BH CV ECHO MEAS - MV DEC TIME: 0.21 SEC
BH CV ECHO MEAS - MV E MAX VEL: 96.1 CM/SEC
BH CV ECHO MEAS - MV E/A: 0.83
BH CV ECHO MEAS - MV MAX PG: 5.5 MMHG
BH CV ECHO MEAS - MV MEAN PG: 1.5 MMHG
BH CV ECHO MEAS - MV P1/2T MAX VEL: 104.5 CM/SEC
BH CV ECHO MEAS - MV P1/2T: 70.6 MSEC
BH CV ECHO MEAS - MV V2 MAX: 117.4 CM/SEC
BH CV ECHO MEAS - MV V2 MEAN: 54 CM/SEC
BH CV ECHO MEAS - MV V2 VTI: 47.6 CM
BH CV ECHO MEAS - MVA P1/2T LCG: 2.1 CM^2
BH CV ECHO MEAS - MVA(P1/2T): 3.1 CM^2
BH CV ECHO MEAS - MVA(VTI): 2.5 CM^2
BH CV ECHO MEAS - PA ACC TIME: 0.11 SEC
BH CV ECHO MEAS - PA MAX PG (FULL): 1.6 MMHG
BH CV ECHO MEAS - PA MAX PG: 3.1 MMHG
BH CV ECHO MEAS - PA PR(ACCEL): 28.3 MMHG
BH CV ECHO MEAS - PA V2 MAX: 88.2 CM/SEC
BH CV ECHO MEAS - PI END-D VEL: 74.7 CM/SEC
BH CV ECHO MEAS - PULM A REVS DUR: 0.08 SEC
BH CV ECHO MEAS - PULM A REVS VEL: 36 CM/SEC
BH CV ECHO MEAS - PULM DIAS VEL: 33.8 CM/SEC
BH CV ECHO MEAS - PULM S/D: 1.2
BH CV ECHO MEAS - PULM SYS VEL: 42 CM/SEC
BH CV ECHO MEAS - PVA(V,A): 1.8 CM^2
BH CV ECHO MEAS - PVA(V,D): 1.8 CM^2
BH CV ECHO MEAS - QP/QS: 0.37
BH CV ECHO MEAS - RAP SYSTOLE: 3 MMHG
BH CV ECHO MEAS - RV MAX PG: 1.5 MMHG
BH CV ECHO MEAS - RV MEAN PG: 1 MMHG
BH CV ECHO MEAS - RV V1 MAX: 61.3 CM/SEC
BH CV ECHO MEAS - RV V1 MEAN: 49.2 CM/SEC
BH CV ECHO MEAS - RV V1 VTI: 16.6 CM
BH CV ECHO MEAS - RVOT AREA: 2.7 CM^2
BH CV ECHO MEAS - RVOT DIAM: 1.8 CM
BH CV ECHO MEAS - RVSP: 27 MMHG
BH CV ECHO MEAS - SI(AO): 169.7 ML/M^2
BH CV ECHO MEAS - SI(CUBED): 32.2 ML/M^2
BH CV ECHO MEAS - SI(LVOT): 67.5 ML/M^2
BH CV ECHO MEAS - SI(MOD-SP2): 28.3 ML/M^2
BH CV ECHO MEAS - SI(MOD-SP4): 21 ML/M^2
BH CV ECHO MEAS - SI(TEICH): 31 ML/M^2
BH CV ECHO MEAS - SUP REN AO DIAM: 2.5 CM
BH CV ECHO MEAS - SV(AO): 299.7 ML
BH CV ECHO MEAS - SV(CUBED): 56.8 ML
BH CV ECHO MEAS - SV(LVOT): 119.1 ML
BH CV ECHO MEAS - SV(MOD-SP2): 50 ML
BH CV ECHO MEAS - SV(MOD-SP4): 37 ML
BH CV ECHO MEAS - SV(RVOT): 44.1 ML
BH CV ECHO MEAS - SV(TEICH): 54.8 ML
BH CV ECHO MEAS - TAPSE (>1.6): 2.2 CM2
BH CV ECHO MEAS - TR MAX VEL: 247 CM/SEC
BH CV ECHO MEASUREMENTS AVERAGE E/E' RATIO: 14.78
BH CV VAS BP RIGHT ARM: NORMAL MMHG
BH CV XLRA - RV BASE: 2.9 CM
BH CV XLRA - RV LENGTH: 6.3 CM
BH CV XLRA - RV MID: 2.8 CM
BH CV XLRA - TDI S': 9 CM/SEC
LEFT ATRIUM VOLUME INDEX: 24 ML/M2
MAXIMAL PREDICTED HEART RATE: 148 BPM
SINUS: 2.9 CM
STJ: 3 CM
STRESS TARGET HR: 126 BPM

## 2020-04-27 PROCEDURE — 99204 OFFICE O/P NEW MOD 45 MIN: CPT | Performed by: INTERNAL MEDICINE

## 2020-04-27 PROCEDURE — 93306 TTE W/DOPPLER COMPLETE: CPT | Performed by: INTERNAL MEDICINE

## 2020-04-27 PROCEDURE — 93306 TTE W/DOPPLER COMPLETE: CPT

## 2020-04-27 PROCEDURE — 93000 ELECTROCARDIOGRAM COMPLETE: CPT | Performed by: INTERNAL MEDICINE

## 2020-04-27 NOTE — TELEPHONE ENCOUNTER
4/27 Pt is scheduled for RICARDA with you Wednesday 4/29 in the Cath Lab @ 830am.    Thank you    Dejan DIANE

## 2020-04-29 ENCOUNTER — HOSPITAL ENCOUNTER (OUTPATIENT)
Dept: CARDIOLOGY | Facility: HOSPITAL | Age: 75
Discharge: HOME | End: 2020-04-29
Admitting: INTERNAL MEDICINE

## 2020-04-29 VITALS
DIASTOLIC BLOOD PRESSURE: 71 MMHG | HEIGHT: 62 IN | RESPIRATION RATE: 16 BRPM | WEIGHT: 166 LBS | HEART RATE: 54 BPM | BODY MASS INDEX: 30.55 KG/M2 | SYSTOLIC BLOOD PRESSURE: 135 MMHG | TEMPERATURE: 98.6 F | OXYGEN SATURATION: 92 %

## 2020-04-29 DIAGNOSIS — R93.1 ABNORMAL FINDINGS ON DIAGNOSTIC IMAGING OF HEART AND CORONARY CIRCULATION: ICD-10-CM

## 2020-04-29 DIAGNOSIS — R01.1 HEART MURMUR: ICD-10-CM

## 2020-04-29 LAB
BH CV ECHO MEAS - BSA(HAYCOCK): 1.8 M^2
BH CV ECHO MEAS - BSA: 1.8 M^2
BH CV ECHO MEAS - BZI_BMI: 30.4 KILOGRAMS/M^2
BH CV ECHO MEAS - BZI_METRIC_HEIGHT: 157.5 CM
BH CV ECHO MEAS - BZI_METRIC_WEIGHT: 75.3 KG
BH CV VAS BP LEFT ARM: NORMAL MMHG
LV EF 2D ECHO EST: 65 %

## 2020-04-29 PROCEDURE — 93325 DOPPLER ECHO COLOR FLOW MAPG: CPT | Performed by: INTERNAL MEDICINE

## 2020-04-29 PROCEDURE — 76376 3D RENDER W/INTRP POSTPROCES: CPT | Performed by: INTERNAL MEDICINE

## 2020-04-29 PROCEDURE — 76376 3D RENDER W/INTRP POSTPROCES: CPT

## 2020-04-29 PROCEDURE — 93325 DOPPLER ECHO COLOR FLOW MAPG: CPT

## 2020-04-29 PROCEDURE — 93320 DOPPLER ECHO COMPLETE: CPT | Performed by: INTERNAL MEDICINE

## 2020-04-29 PROCEDURE — 93312 ECHO TRANSESOPHAGEAL: CPT | Performed by: INTERNAL MEDICINE

## 2020-04-29 PROCEDURE — 93320 DOPPLER ECHO COMPLETE: CPT

## 2020-04-29 PROCEDURE — 25010000002 FENTANYL CITRATE (PF) 100 MCG/2ML SOLUTION: Performed by: INTERNAL MEDICINE

## 2020-04-29 PROCEDURE — 25010000002 MIDAZOLAM PER 1 MG: Performed by: INTERNAL MEDICINE

## 2020-04-29 PROCEDURE — 93312 ECHO TRANSESOPHAGEAL: CPT

## 2020-04-29 RX ORDER — FENTANYL CITRATE 50 UG/ML
INJECTION, SOLUTION INTRAMUSCULAR; INTRAVENOUS
Status: COMPLETED | OUTPATIENT
Start: 2020-04-29 | End: 2020-04-29

## 2020-04-29 RX ORDER — LOPERAMIDE HYDROCHLORIDE 2 MG/1
8 CAPSULE ORAL DAILY
COMMUNITY
End: 2020-07-07 | Stop reason: SDUPTHER

## 2020-04-29 RX ORDER — MIDAZOLAM HYDROCHLORIDE 1 MG/ML
INJECTION INTRAMUSCULAR; INTRAVENOUS
Status: COMPLETED | OUTPATIENT
Start: 2020-04-29 | End: 2020-04-29

## 2020-04-29 RX ORDER — SODIUM CHLORIDE 9 MG/ML
75 INJECTION, SOLUTION INTRAVENOUS CONTINUOUS
Status: DISCONTINUED | OUTPATIENT
Start: 2020-04-29 | End: 2020-04-30 | Stop reason: HOSPADM

## 2020-04-29 RX ORDER — SODIUM CHLORIDE 0.9 % (FLUSH) 0.9 %
10 SYRINGE (ML) INJECTION AS NEEDED
Status: DISCONTINUED | OUTPATIENT
Start: 2020-04-29 | End: 2020-04-29 | Stop reason: HOSPADM

## 2020-04-29 RX ORDER — LIDOCAINE HYDROCHLORIDE 10 MG/ML
0.1 INJECTION, SOLUTION EPIDURAL; INFILTRATION; INTRACAUDAL; PERINEURAL ONCE AS NEEDED
Status: DISCONTINUED | OUTPATIENT
Start: 2020-04-29 | End: 2020-04-29 | Stop reason: HOSPADM

## 2020-04-29 RX ORDER — SODIUM CHLORIDE 0.9 % (FLUSH) 0.9 %
3 SYRINGE (ML) INJECTION EVERY 12 HOURS SCHEDULED
Status: DISCONTINUED | OUTPATIENT
Start: 2020-04-29 | End: 2020-04-29 | Stop reason: HOSPADM

## 2020-04-29 RX ADMIN — MIDAZOLAM 1 MG: 1 INJECTION INTRAMUSCULAR; INTRAVENOUS at 08:40

## 2020-04-29 RX ADMIN — MIDAZOLAM 1 MG: 1 INJECTION INTRAMUSCULAR; INTRAVENOUS at 08:43

## 2020-04-29 RX ADMIN — FENTANYL CITRATE 25 MCG: 50 INJECTION INTRAMUSCULAR; INTRAVENOUS at 08:43

## 2020-04-29 RX ADMIN — FENTANYL CITRATE 25 MCG: 50 INJECTION INTRAMUSCULAR; INTRAVENOUS at 08:39

## 2020-04-29 RX ADMIN — FENTANYL CITRATE 25 MCG: 50 INJECTION INTRAMUSCULAR; INTRAVENOUS at 08:50

## 2020-04-29 RX ADMIN — SODIUM CHLORIDE 75 ML/HR: 9 INJECTION, SOLUTION INTRAVENOUS at 08:04

## 2020-04-29 RX ADMIN — FENTANYL CITRATE 25 MCG: 50 INJECTION INTRAMUSCULAR; INTRAVENOUS at 09:07

## 2020-04-29 RX ADMIN — MIDAZOLAM 1 MG: 1 INJECTION INTRAMUSCULAR; INTRAVENOUS at 08:59

## 2020-04-29 RX ADMIN — MIDAZOLAM 1 MG: 1 INJECTION INTRAMUSCULAR; INTRAVENOUS at 08:50

## 2020-04-29 RX ADMIN — MIDAZOLAM 1 MG: 1 INJECTION INTRAMUSCULAR; INTRAVENOUS at 08:46

## 2020-04-29 RX ADMIN — FENTANYL CITRATE 25 MCG: 50 INJECTION INTRAMUSCULAR; INTRAVENOUS at 09:12

## 2020-04-30 DIAGNOSIS — R01.1 HEART MURMUR: Primary | ICD-10-CM

## 2020-04-30 DIAGNOSIS — R94.39 ABNORMAL RESULT OF OTHER CARDIOVASCULAR FUNCTION STUDY: ICD-10-CM

## 2020-05-06 ENCOUNTER — HOSPITAL ENCOUNTER (OUTPATIENT)
Dept: CT IMAGING | Facility: HOSPITAL | Age: 75
Discharge: HOME | End: 2020-05-06
Admitting: INTERNAL MEDICINE

## 2020-05-06 ENCOUNTER — DOCUMENTATION (OUTPATIENT)
Dept: CARDIOLOGY | Facility: CLINIC | Age: 75
End: 2020-05-06

## 2020-05-06 VITALS
BODY MASS INDEX: 30.55 KG/M2 | WEIGHT: 166 LBS | SYSTOLIC BLOOD PRESSURE: 135 MMHG | DIASTOLIC BLOOD PRESSURE: 61 MMHG | TEMPERATURE: 97.6 F | OXYGEN SATURATION: 98 % | HEART RATE: 55 BPM | HEIGHT: 62 IN | RESPIRATION RATE: 16 BRPM

## 2020-05-06 DIAGNOSIS — R01.1 HEART MURMUR: ICD-10-CM

## 2020-05-06 DIAGNOSIS — R94.39 ABNORMAL RESULT OF OTHER CARDIOVASCULAR FUNCTION STUDY: ICD-10-CM

## 2020-05-06 LAB — CREAT BLDA-MCNC: 0.8 MG/DL (ref 0.6–1.3)

## 2020-05-06 PROCEDURE — 75574 CT ANGIO HRT W/3D IMAGE: CPT | Performed by: INTERNAL MEDICINE

## 2020-05-06 PROCEDURE — 82565 ASSAY OF CREATININE: CPT

## 2020-05-06 PROCEDURE — 0 IOPAMIDOL PER 1 ML: Performed by: INTERNAL MEDICINE

## 2020-05-06 PROCEDURE — 93005 ELECTROCARDIOGRAM TRACING: CPT | Performed by: INTERNAL MEDICINE

## 2020-05-06 PROCEDURE — 75574 CT ANGIO HRT W/3D IMAGE: CPT

## 2020-05-06 PROCEDURE — 93010 ELECTROCARDIOGRAM REPORT: CPT | Performed by: INTERNAL MEDICINE

## 2020-05-06 RX ADMIN — IOPAMIDOL 100 ML: 755 INJECTION, SOLUTION INTRAVENOUS at 10:53

## 2020-05-06 NOTE — PROGRESS NOTES
Cardiac CTA with morphology  5/6/20  Reason for the exam: Possible shunt    Calcium score is 29 Agatston units.  This is between the 26-50 percentile for women between the ages of 70-74 years old.    Heart rate 57 bpm.  Left ventricular end-diastolic volume 134 mL.  Left ventricular end-systolic volume 55 mL.  Ejection fraction 59%.  Stroke volume 78 mL.  Cardiac output 4.5 mL/m    The right atrium is dilated.  The right ventricle is dilated with normal systolic function.  The pulmonary artery is normal in size.  There are 4 pulmonary veins which enter the left atrium in their expected location.  The left atrial appendage was visualized and is without thrombus.  The intra-atrial septum appeared to be intact.  The left ventricle is normal in size with normal systolic function.  There was no evidence of a left ventricular thrombus.  The intraventricular septum appeared to be intact.  The mitral valve appeared structurally normal.  The aortic valve was trileaflet and appears structurally and functionally normal.  The pulmonic valve appeared structurally normal.  The tricuspid valve appeared structurally normal.  There was no pericardial effusion.  The pericardium appeared normal.    The left main coronary artery came off the left coronary cusp in its anticipated location.  It bifurcated into the left anterior descending artery and the circumflex coronary artery.  There was no evidence of atherosclerotic disease of the left main coronary artery.  Left anterior descending artery wraps around the apex of the heart.  There is a small calcified plaque in the proximal left anterior descending artery which is not flow obstructing.  The circumflex artery is the nondominant vessel with no evidence of atherosclerotic disease.  The right coronary artery is the dominant vessel with no evidence of atherosclerotic disease.    Conclusions:  1.  There is a calcified plaque in the proximal left anterior descending artery which is not  flow obstructing.  Otherwise there is no evidence of coronary artery disease or anomalous coronary arteries..  2.  There is normal left ventricular function with an ejection fraction of 59%.  3.  The right atrium and right ventricle are dilated.  There is normal right ventricular function.  4.  There was no evidence of anomaly causing right ventricular volume overload noted on this study.    Ramona Gavin MD  05/06/20

## 2020-05-06 NOTE — NURSING NOTE
Spoke with Ann Marie AKINS with [facility].  She says we may proceed with the CTA and her B/P, heart rate on EKG and pulse ox with crea 0.8 and eGFR were given.  Dr. Gavin will be reading the CTA.

## 2020-05-06 NOTE — NURSING NOTE
To Main Entrance by W/C with N/A.  Patient is driving herself home.  No problems or concerns noted.

## 2020-05-06 NOTE — NURSING NOTE
In x ray triage for CTA heart with 3 D imaging. Mask, goggles, gloves and hand hygiene used with each encounter with the patient.   - - -

## 2020-05-08 DIAGNOSIS — Q24.5 CORONARY SINUS ABNORMALITY: ICD-10-CM

## 2020-05-08 DIAGNOSIS — R06.02 SHORTNESS OF BREATH: Primary | ICD-10-CM

## 2020-05-08 NOTE — PROGRESS NOTES
I called and spoke with her.  She continues to have profound fatigue and dyspnea.  She has the systolic murmur that is likely mild MVP, but she has these big atria and this giant coronary sinus.  The CTA doesn't show a left sided SVC or ASD.      I think she likely has a normal variant, but the issue is that her symptoms are so profound and she remains sure that it is cardiac.    I have recommended a RHC with a shunt run and a LHC with cors.    I would like Dr Hernandez to perform this, if possible.     MODESTA TEE

## 2020-05-11 ENCOUNTER — TELEPHONE (OUTPATIENT)
Dept: CARDIOLOGY | Facility: CLINIC | Age: 75
End: 2020-05-11

## 2020-05-11 ENCOUNTER — TRANSCRIBE ORDERS (OUTPATIENT)
Dept: CARDIOLOGY | Facility: CLINIC | Age: 75
End: 2020-05-11

## 2020-05-11 DIAGNOSIS — Z01.810 PREPROCEDURAL CARDIOVASCULAR EXAMINATION: ICD-10-CM

## 2020-05-11 DIAGNOSIS — Z13.6 SCREENING FOR CARDIOVASCULAR CONDITION: Primary | ICD-10-CM

## 2020-05-11 PROBLEM — R06.02 SHORTNESS OF BREATH: Status: ACTIVE | Noted: 2020-05-11

## 2020-05-11 PROBLEM — Q24.5 CORONARY SINUS ABNORMALITY: Status: ACTIVE | Noted: 2020-05-11

## 2020-05-11 RX ORDER — TRAZODONE HYDROCHLORIDE 100 MG/1
100 TABLET ORAL NIGHTLY
Qty: 90 TABLET | Refills: 2 | Status: SHIPPED | OUTPATIENT
Start: 2020-05-11 | End: 2020-05-15 | Stop reason: SDUPTHER

## 2020-05-11 NOTE — TELEPHONE ENCOUNTER
Is her cath on for tomorrow?    Can someone contact her?  Does she need to go to the ED?    MODESTA TEE

## 2020-05-11 NOTE — TELEPHONE ENCOUNTER
----- Message from Joi Aleman sent at 5/9/2020 12:34 PM EDT -----  Regarding: Complaint  Contact: [phone]  Dr Estrada, my prayers are with because my symptoms are getting worse.  I can't wash my face and brush my teeth without having to sit and rest for 15, 20 minutes.  Then get dressed and rest again.  Can only eat peanutbutter sandwich on 1 piece of bread. No strength to eat more. I'm so scared.  Thank you for listening.   Joi Aleman

## 2020-05-11 NOTE — TELEPHONE ENCOUNTER
Dr. Estrada,    I called her. She said she sent that message because no one has called to schedule her cath for this week, but she said she is ok and doesn't need to come to the ER.    Scheduling,    Could you attempt to get her scheduled for a heart cath this week?    Thank you!    Camelia Schaeffer RN  Triage [facility]

## 2020-05-12 ENCOUNTER — HOSPITAL ENCOUNTER (OUTPATIENT)
Facility: HOSPITAL | Age: 75
Setting detail: HOSPITAL OUTPATIENT SURGERY
Discharge: HOME | End: 2020-05-12
Attending: INTERNAL MEDICINE | Admitting: INTERNAL MEDICINE

## 2020-05-12 VITALS
TEMPERATURE: 98.5 F | DIASTOLIC BLOOD PRESSURE: 64 MMHG | SYSTOLIC BLOOD PRESSURE: 130 MMHG | HEIGHT: 62 IN | RESPIRATION RATE: 16 BRPM | OXYGEN SATURATION: 95 % | BODY MASS INDEX: 30.14 KG/M2 | WEIGHT: 163.8 LBS | HEART RATE: 50 BPM

## 2020-05-12 DIAGNOSIS — Q24.5 CORONARY SINUS ABNORMALITY: ICD-10-CM

## 2020-05-12 DIAGNOSIS — R06.02 SHORTNESS OF BREATH: ICD-10-CM

## 2020-05-12 LAB
ANION GAP SERPL CALCULATED.3IONS-SCNC: 10.7 MMOL/L (ref 5–15)
BASOPHILS # BLD AUTO: 0.05 10*3/MM3 (ref 0–0.2)
BASOPHILS NFR BLD AUTO: 0.8 % (ref 0–1.5)
BUN BLD-MCNC: 11 MG/DL (ref 8–23)
BUN/CREAT SERPL: 13.1 (ref 7–25)
CALCIUM SPEC-SCNC: 8.8 MG/DL (ref 8.6–10.5)
CHLORIDE SERPL-SCNC: 103 MMOL/L (ref 98–107)
CO2 SERPL-SCNC: 26.3 MMOL/L (ref 22–29)
CREAT BLD-MCNC: 0.84 MG/DL (ref 0.57–1)
DEPRECATED RDW RBC AUTO: 44.9 FL (ref 37–54)
EOSINOPHIL # BLD AUTO: 0.19 10*3/MM3 (ref 0–0.4)
EOSINOPHIL NFR BLD AUTO: 3.1 % (ref 0.3–6.2)
ERYTHROCYTE [DISTWIDTH] IN BLOOD BY AUTOMATED COUNT: 13.2 % (ref 12.3–15.4)
GFR SERPL CREATININE-BSD FRML MDRD: 67 ML/MIN/1.73
GLUCOSE BLD-MCNC: 97 MG/DL (ref 65–99)
HCT VFR BLD AUTO: 39.8 % (ref 34–46.6)
HCT VFR BLDA CALC: 33 % (ref 38–51)
HCT VFR BLDA CALC: 34 % (ref 38–51)
HGB BLD-MCNC: 12.9 G/DL (ref 12–15.9)
HGB BLDA-MCNC: 11.2 G/DL (ref 12–17)
HGB BLDA-MCNC: 11.6 G/DL (ref 12–17)
IMM GRANULOCYTES # BLD AUTO: 0.02 10*3/MM3 (ref 0–0.05)
IMM GRANULOCYTES NFR BLD AUTO: 0.3 % (ref 0–0.5)
LYMPHOCYTES # BLD AUTO: 1.44 10*3/MM3 (ref 0.7–3.1)
LYMPHOCYTES NFR BLD AUTO: 23.6 % (ref 19.6–45.3)
MCH RBC QN AUTO: 29.7 PG (ref 26.6–33)
MCHC RBC AUTO-ENTMCNC: 32.4 G/DL (ref 31.5–35.7)
MCV RBC AUTO: 91.7 FL (ref 79–97)
MONOCYTES # BLD AUTO: 0.58 10*3/MM3 (ref 0.1–0.9)
MONOCYTES NFR BLD AUTO: 9.5 % (ref 5–12)
NEUTROPHILS # BLD AUTO: 3.82 10*3/MM3 (ref 1.7–7)
NEUTROPHILS NFR BLD AUTO: 62.7 % (ref 42.7–76)
NRBC BLD AUTO-RTO: 0.2 /100 WBC (ref 0–0.2)
PLATELET # BLD AUTO: 224 10*3/MM3 (ref 140–450)
PMV BLD AUTO: 9 FL (ref 6–12)
POTASSIUM BLD-SCNC: 3.7 MMOL/L (ref 3.5–5.2)
RBC # BLD AUTO: 4.34 10*6/MM3 (ref 3.77–5.28)
SAO2 % BLDA: 61 % (ref 95–98)
SAO2 % BLDA: 66 % (ref 95–98)
SAO2 % BLDA: 67 % (ref 95–98)
SAO2 % BLDA: 68 % (ref 95–98)
SAO2 % BLDA: 71 % (ref 95–98)
SAO2 % BLDA: 88 % (ref 95–98)
SODIUM BLD-SCNC: 140 MMOL/L (ref 136–145)
WBC NRBC COR # BLD: 6.1 10*3/MM3 (ref 3.4–10.8)

## 2020-05-12 PROCEDURE — 25010000002 HEPARIN (PORCINE) PER 1000 UNITS: Performed by: INTERNAL MEDICINE

## 2020-05-12 PROCEDURE — 0 IOPAMIDOL PER 1 ML: Performed by: INTERNAL MEDICINE

## 2020-05-12 PROCEDURE — 25010000002 FENTANYL CITRATE (PF) 100 MCG/2ML SOLUTION: Performed by: INTERNAL MEDICINE

## 2020-05-12 PROCEDURE — 85025 COMPLETE CBC W/AUTO DIFF WBC: CPT | Performed by: INTERNAL MEDICINE

## 2020-05-12 PROCEDURE — C1769 GUIDE WIRE: HCPCS | Performed by: INTERNAL MEDICINE

## 2020-05-12 PROCEDURE — C1894 INTRO/SHEATH, NON-LASER: HCPCS | Performed by: INTERNAL MEDICINE

## 2020-05-12 PROCEDURE — 93460 R&L HRT ART/VENTRICLE ANGIO: CPT | Performed by: INTERNAL MEDICINE

## 2020-05-12 PROCEDURE — 80048 BASIC METABOLIC PNL TOTAL CA: CPT | Performed by: INTERNAL MEDICINE

## 2020-05-12 PROCEDURE — 85014 HEMATOCRIT: CPT

## 2020-05-12 PROCEDURE — 85018 HEMOGLOBIN: CPT

## 2020-05-12 PROCEDURE — 25010000002 MIDAZOLAM PER 1 MG: Performed by: INTERNAL MEDICINE

## 2020-05-12 RX ORDER — LIDOCAINE HYDROCHLORIDE 10 MG/ML
0.1 INJECTION, SOLUTION EPIDURAL; INFILTRATION; INTRACAUDAL; PERINEURAL ONCE AS NEEDED
Status: DISCONTINUED | OUTPATIENT
Start: 2020-05-12 | End: 2020-05-12 | Stop reason: HOSPADM

## 2020-05-12 RX ORDER — LIDOCAINE HYDROCHLORIDE 20 MG/ML
INJECTION, SOLUTION INFILTRATION; PERINEURAL AS NEEDED
Status: DISCONTINUED | OUTPATIENT
Start: 2020-05-12 | End: 2020-05-12 | Stop reason: HOSPADM

## 2020-05-12 RX ORDER — SODIUM CHLORIDE 0.9 % (FLUSH) 0.9 %
3 SYRINGE (ML) INJECTION EVERY 12 HOURS SCHEDULED
Status: DISCONTINUED | OUTPATIENT
Start: 2020-05-12 | End: 2020-05-12 | Stop reason: HOSPADM

## 2020-05-12 RX ORDER — SODIUM CHLORIDE 0.9 % (FLUSH) 0.9 %
10 SYRINGE (ML) INJECTION AS NEEDED
Status: DISCONTINUED | OUTPATIENT
Start: 2020-05-12 | End: 2020-05-12 | Stop reason: HOSPADM

## 2020-05-12 RX ORDER — MIDAZOLAM HYDROCHLORIDE 1 MG/ML
INJECTION INTRAMUSCULAR; INTRAVENOUS AS NEEDED
Status: DISCONTINUED | OUTPATIENT
Start: 2020-05-12 | End: 2020-05-12 | Stop reason: HOSPADM

## 2020-05-12 RX ORDER — FENTANYL CITRATE 50 UG/ML
INJECTION, SOLUTION INTRAMUSCULAR; INTRAVENOUS AS NEEDED
Status: DISCONTINUED | OUTPATIENT
Start: 2020-05-12 | End: 2020-05-12 | Stop reason: HOSPADM

## 2020-05-12 RX ORDER — ACETAMINOPHEN 325 MG/1
650 TABLET ORAL EVERY 4 HOURS PRN
Status: DISCONTINUED | OUTPATIENT
Start: 2020-05-12 | End: 2020-05-12 | Stop reason: HOSPADM

## 2020-05-12 RX ORDER — SODIUM CHLORIDE 9 MG/ML
100 INJECTION, SOLUTION INTRAVENOUS CONTINUOUS
Status: DISCONTINUED | OUTPATIENT
Start: 2020-05-12 | End: 2020-05-12 | Stop reason: HOSPADM

## 2020-05-12 RX ORDER — SODIUM CHLORIDE 9 MG/ML
75 INJECTION, SOLUTION INTRAVENOUS CONTINUOUS
Status: DISCONTINUED | OUTPATIENT
Start: 2020-05-12 | End: 2020-05-12 | Stop reason: HOSPADM

## 2020-05-12 RX ADMIN — SODIUM CHLORIDE 75 ML/HR: 9 INJECTION, SOLUTION INTRAVENOUS at 09:00

## 2020-05-12 NOTE — DISCHARGE INSTRUCTIONS
[facility]  [address] [place] [address]    Coronary Angiogram (Radial/Ulnar Approach) After Care    Refer to this sheet in the next few weeks. These instructions provide you with information on caring for yourself after your procedure. Your caregiver may also give you more specific instructions. Your treatment has been planned according to current medical practices, but problems sometimes occur. Call your caregiver if you have any problems or questions after your procedure.    Home Care Instructions:  · You may shower the day after the procedure. Remove the bandage (dressing) and gently wash the site with plain soap and water. Gently pat the site dry. You may apply a band aid daily for 2 days if desired.    · Do not apply powder or lotion to the site.  · Do not submerge the affected site in water for 3 to 5 days or until the site is completely healed.   · Do not lift, push or pull anything over 10 pounds for 2 days after your procedure.  · Inspect the site at least twice daily. You may notice some bruising at the site and it may be tender for 1 to 2 weeks.     · Increase your fluid intake for the next 2 days.    · Keep arm elevated for 24 hours. For the remainder of the day, keep your arm in “Pledge of Allegiance” position when up and about.     · You may drive 24 hours after the procedure unless otherwise instructed by your caregiver.  · Do not operate machinery or power tools for 24 hours.  · A responsible adult should be with you for the first 24 hours after you arrive home. Do not make any important legal decisions or sign legal papers for 24 hours.  Do not drink alcohol for 24 hours.    · Metformin or any medications containing Metformin should not be taken for 48 hours after your procedure.      Call Your Doctor if:   · You have unusual pain at the radial/ulnar (wrist) site.  · You have redness, warmth, swelling, or pain at the radial/ulnar (wrist) site.  · You have drainage (other  than a small amount of blood on the dressing).  · You have chills or a fever > 101.  · Your arm becomes pale or dark, cool, tingly, or numb.  · You have heavy bleeding from the site, hold pressure on the site for 20 minutes.  If the bleeding stops, apply a fresh bandage and call your cardiologist.  However, if you continue to have bleeding, call 911.

## 2020-05-12 NOTE — INTERVAL H&P NOTE
H&P reviewed. The patient was examined and there are no changes to the H&P.     Work up has suggested possible shunt and will proceed with LHC and RHC with Shunt run

## 2020-05-15 RX ORDER — TRAZODONE HYDROCHLORIDE 100 MG/1
100 TABLET ORAL NIGHTLY
Qty: 30 TABLET | Refills: 0 | Status: SHIPPED | OUTPATIENT
Start: 2020-05-15 | End: 2020-06-09 | Stop reason: SDUPTHER

## 2020-06-01 NOTE — PROGRESS NOTES
.  RM:________     PCP: Yaneth Leal MD    : 1945  AGE: 74 y.o.  EST PATIENT   REASON FOR VISIT/  CC:  CHEST PAIN; POST CARDIAC CATH      WT: ____________ BP: __________L __________R HR______    CHEST PAIN: _____________    SOA: _____________PALPS: _______________     LIGHTHEADED: ___________FATIGUE: ________________ EDEMA __________    ALLERGIES:Patient has no known allergies. SMOKING HISTORY:  Social History     Tobacco Use   • Smoking status: Former Smoker     Packs/day: 1.50     Types: Cigarettes     Last attempt to quit: 1975     Years since quittin.4   • Smokeless tobacco: Never Used   Substance Use Topics   • Alcohol use: Yes     Frequency: 2-4 times a month   • Drug use: Never     CAFFEINE USE_________________  ALCOHOL ______________________    Below is the patient's most recent value for Albumin, ALT, AST, BUN, Calcium, Chloride, Cholesterol, CO2, Creatinine, GFR, Glucose, HDL, Hematocrit, Hemoglobin, Hemoglobin A1C, LDL, Magnesium, Phosphorus, Platelets, Potassium, PSA, Sodium, Triglycerides, TSH and WBC.   Lab Results   Component Value Date    ALBUMIN 3.70 2020    ALT 14 2020    AST 18 2020    BUN 11 2020    CALCIUM 8.8 2020     2020    CO2 26.3 2020    CREATININE 0.84 2020    GLU 93 2020    HDL 89 (H) 2020    HCT 34 (L) 2020    HGB 11.6 (L) 2020     (H) 2020    MG 2.0 2020     2020    K 3.7 2020     2020    TRIG 66 2020    TSH 1.790 2018    WBC 6.10 2020          NEW DIAGNOSIS/ SURGERY/ HOSP OR ED VISITS: ______________________    __________________________________________________________________      RECENT LABS OR DIAGNOSTIC TESTING:  _____________________________    __________________________________________________________________      ASSESSMENT/ PLAN:  _______________________________________________    __________________________________________________________________

## 2020-06-03 ENCOUNTER — OFFICE VISIT (OUTPATIENT)
Dept: CARDIOLOGY | Facility: CLINIC | Age: 75
End: 2020-06-03

## 2020-06-03 VITALS
BODY MASS INDEX: 30 KG/M2 | SYSTOLIC BLOOD PRESSURE: 112 MMHG | DIASTOLIC BLOOD PRESSURE: 62 MMHG | HEART RATE: 42 BPM | HEIGHT: 62 IN | WEIGHT: 163 LBS

## 2020-06-03 DIAGNOSIS — R53.1 GENERALIZED WEAKNESS: ICD-10-CM

## 2020-06-03 DIAGNOSIS — R93.1 ABNORMAL ECHOCARDIOGRAM: ICD-10-CM

## 2020-06-03 DIAGNOSIS — R06.02 SHORTNESS OF BREATH: Primary | ICD-10-CM

## 2020-06-03 DIAGNOSIS — I34.1 MITRAL VALVE PROLAPSE: ICD-10-CM

## 2020-06-03 PROCEDURE — 93000 ELECTROCARDIOGRAM COMPLETE: CPT | Performed by: INTERNAL MEDICINE

## 2020-06-03 PROCEDURE — 99214 OFFICE O/P EST MOD 30 MIN: CPT | Performed by: INTERNAL MEDICINE

## 2020-06-03 NOTE — PROGRESS NOTES
Date of Office Visit: 2020  Encounter Provider: Thomas Estrada MD  Place of Service: [facility] CARDIOLOGY  Patient Name: Joi Aleman  :1945    Chief Complaint   Patient presents with   • Shortness of Breath   :     HPI:     Ms. Aleman is 74 y.o. and presents today to follow up.  She has hypertension, treated with amlodipine.  She has a history of a heart murmur and had an essentially normal echo at [facility] in . She had a normal treadmill stress test in .  She had a normal perfusion stress at [facility] in .      She presented in May with 4-5 years of progressive generalized severe fatigue, generalized weakness, exertional dyspnea, and occasional atypical chest pain.  Her EKG was normal except for sinus bradycardia.  I did hear a systolic murmur.  I checked an echo; it revealed normal LV systolic function, normal diastolic function for age, and possible MVP/MR.  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  It reported moderate TR with moderate PHTN.  However, when I sent her for a right heart cath, all of her pressures were normal.  There was no step up, and no evidence of constriction.  Her coronaries were essentially normal (there were some luminal irregularities, and very mild LAD bridging).    I sent her for a cardiac CT.  No congenital abnormalities were seen (specifically, the pulmonary veins were normal, and no ASD was noted).  She did have mild RA/RV dilation, but normal function.  Her Agatston score was 29.  Her lungs looked normal.     Because of the mild LAD bridging, she was started on metoprolol tartrate.  She does feel she's just a little better, but she's still tired and dyspneic after a flight of stairs.  She denies chest pain, leg swelling, or orthopnea.     Past Medical History:   Diagnosis Date   • Anxiety    • Arthritis    • Brain aneurysm     • Depression    • Hyperlipidemia    • Hypertension    • Insomnia        Past Surgical History:   Procedure Laterality Date   • BLEPHAROPLASTY     • CARDIAC CATHETERIZATION N/A 2020    Procedure: Coronary angiography;  Surgeon: Víctor Hernandez MD;  Location:  [facility];  Service: Cardiovascular;  Laterality: N/A;   • CARDIAC CATHETERIZATION N/A 2020    Procedure: Left heart cath;  Surgeon: Víctor Hernandez MD;  Location:  [facility];  Service: Cardiovascular;  Laterality: N/A;   • CARDIAC CATHETERIZATION N/A 2020    Procedure: Left ventriculography;  Surgeon: Víctor Hernandez MD;  Location:  [facility];  Service: Cardiovascular;  Laterality: N/A;   • CARDIAC CATHETERIZATION N/A 2020    Procedure: Right heart cath with shunt run;  Surgeon: Víctor Hernandez MD;  Location:  [facility];  Service: Cardiovascular;  Laterality: N/A;   • CARPAL TUNNEL RELEASE Right    • COLON SURGERY     • COLONOSCOPY      normal less than 10 years ago   • HYSTERECTOMY     • TOE SURGERY Right     right big toe replecemnet 12 years ago   • TONSILLECTOMY         Social History     Socioeconomic History   • Marital status: Single     Spouse name: Not on file   • Number of children: 2   • Years of education: Not on file   • Highest education level: Not on file   Occupational History   • Occupation: retired    • Occupation: property closing    Tobacco Use   • Smoking status: Former Smoker     Packs/day: 1.50     Types: Cigarettes     Last attempt to quit: 1975     Years since quittin.4   • Smokeless tobacco: Never Used   Substance and Sexual Activity   • Alcohol use: Yes     Frequency: 2-4 times a month   • Drug use: Never   • Sexual activity: Defer     Family History   Problem Relation Age of Onset   • Breast cancer Mother 76   • Heart disease Father    • Heart attack Father    • Heart disease Paternal Grandfather    • Heart attack  Paternal Grandfather    • No Known Problems Sister    • No Known Problems Brother    • Prostate cancer Maternal Grandfather    • Heart disease Sister      Review of Systems   Constitution: Positive for malaise/fatigue.   HENT: Negative for ear pain and sore throat.    Cardiovascular: Positive for dyspnea on exertion. Negative for leg swelling, paroxysmal nocturnal dyspnea and syncope.   Respiratory: Negative for hemoptysis and sputum production.    Skin: Negative for rash.   Musculoskeletal: Negative for neck pain.   Gastrointestinal: Negative for abdominal pain and vomiting.   Neurological: Negative for headaches.   All other systems reviewed and are negative.    No Known Allergies      Current Outpatient Medications:   •  amLODIPine (NORVASC) 10 MG tablet, Take 1 tablet by mouth Daily., Disp: 90 tablet, Rfl: 3  •  atorvastatin (LIPITOR) 20 MG tablet, Take 1 tablet by mouth Daily., Disp: 90 tablet, Rfl: 3  •  loperamide (IMODIUM) 2 MG capsule, Take 2 mg by mouth Daily., Disp: , Rfl:   •  MAGNESIUM GLUCONATE PO, Take 27 mg by mouth 2 (Two) Times a Day., Disp: , Rfl:   •  metoprolol tartrate (LOPRESSOR) 25 MG tablet, Take 1 tablet by mouth 2 (Two) Times a Day., Disp: 180 tablet, Rfl: 3  •  MULTIPLE VITAMINS PO, Take  by mouth., Disp: , Rfl:   •  sertraline (ZOLOFT) 100 MG tablet, TAKE 2 TABLETS EVERY DAY, Disp: , Rfl:   •  traZODone (DESYREL) 100 MG tablet, Take 1 tablet by mouth Every Night., Disp: 30 tablet, Rfl: 0  •  vitamin B-12 (CYANOCOBALAMIN) 1000 MCG tablet, Take 1,000 mcg by mouth Daily., Disp: , Rfl:       Objective:     There were no vitals filed for this visit.  There is no height or weight on file to calculate BMI.    Physical Exam   Constitutional: She is oriented to person, place, and time. She appears well-developed and well-nourished.   HENT:   Head: Normocephalic.   Nose: Nose normal.   Mouth/Throat: Oropharynx is clear and moist.   Eyes: Pupils are equal, round, and reactive to light. Conjunctivae  and EOM are normal.   Neck: Normal range of motion. No JVD present.   Cardiovascular: Regular rhythm and intact distal pulses. Bradycardia present.   Murmur heard.   Crescendo-decrescendo mid to late systolic murmur is present with a grade of 2/6 at the apex.  Pulmonary/Chest: Effort normal and breath sounds normal.   Abdominal: Soft. There is no tenderness.   Musculoskeletal: Normal range of motion. She exhibits no edema.   Neurological: She is alert and oriented to person, place, and time. No cranial nerve deficit.   Skin: Skin is warm and dry. No rash noted.   Psychiatric: She has a normal mood and affect. Her behavior is normal. Judgment and thought content normal.   Vitals reviewed.        ECG 12 Lead  Date/Time: 6/3/2020 1:43 PM  Performed by: Thomas Estrada MD  Authorized by: Thomas Estrada MD   Comparison: compared with previous ECG   Similar to previous ECG  Rhythm: sinus bradycardia  Rate: bradycardic  Conduction: conduction normal  ST Segments: ST segments normal  T Waves: T waves normal  QRS axis: normal  Other: no other findings    Clinical impression: normal ECG            Assessment:       Diagnosis Plan   1. Shortness of breath     2. Generalized weakness     3. Abnormal echocardiogram     4. Mitral valve prolapse          Plan:       Ms Aleman has had an extensive workup.  And while we've found some minor abnormalities, we haven't found anything that would cause profound fatigue or dyspnea.    She has normal LV systolic function, and normal filling pressures.  She has mild-moderate MVP and mild-moderate MR (this explains her murmur).  She has a mild LAD bridge and no significant CAD.  While her RA/RV are mildly dilated, there is normal function, and no evidence of pulmonary hypertension or shunt.      She has a dilated coronary sinus, but this is a normal variant in her, as a cardiac CTA showed no congenital abnormalities.      She was started on metoprolol in case the bridge caused her chest pain;  she's tolerating it, but her HR is too low, so I asked her to cut it in half.    I think she has some deconditioning, but she should probably have a sleep study and PFTs.  However, I will defer that to Dr Leal.     I will repeat an echo in one year to reassess her mitral valve.     Sincerely,       Thomas Estrada MD                Answers for HPI/ROS submitted by the patient on 4/23/2020   Shortness of breath  What is the primary reason for your visit?: Shortness of Breath  Chronicity: chronic  Onset: more than 1 year ago  Frequency: every few minutes  Progression since onset: gradually worsening  Episode duration: 10 minutes  coryza: No  Aggravating factors: any activity

## 2020-06-09 ENCOUNTER — OFFICE VISIT (OUTPATIENT)
Dept: FAMILY MEDICINE CLINIC | Facility: CLINIC | Age: 75
End: 2020-06-09

## 2020-06-09 VITALS
WEIGHT: 166 LBS | TEMPERATURE: 98.2 F | HEIGHT: 62 IN | HEART RATE: 59 BPM | BODY MASS INDEX: 30.55 KG/M2 | OXYGEN SATURATION: 95 % | SYSTOLIC BLOOD PRESSURE: 112 MMHG | DIASTOLIC BLOOD PRESSURE: 66 MMHG

## 2020-06-09 DIAGNOSIS — Z00.00 MEDICARE ANNUAL WELLNESS VISIT, SUBSEQUENT: Primary | ICD-10-CM

## 2020-06-09 DIAGNOSIS — F51.01 PRIMARY INSOMNIA: ICD-10-CM

## 2020-06-09 DIAGNOSIS — Z78.0 POSTMENOPAUSAL: ICD-10-CM

## 2020-06-09 DIAGNOSIS — Z12.31 ENCOUNTER FOR SCREENING MAMMOGRAM FOR MALIGNANT NEOPLASM OF BREAST: ICD-10-CM

## 2020-06-09 PROCEDURE — G0439 PPPS, SUBSEQ VISIT: HCPCS | Performed by: FAMILY MEDICINE

## 2020-06-09 PROCEDURE — 96160 PT-FOCUSED HLTH RISK ASSMT: CPT | Performed by: FAMILY MEDICINE

## 2020-06-09 RX ORDER — TRAZODONE HYDROCHLORIDE 50 MG/1
100 TABLET ORAL NIGHTLY
Qty: 180 TABLET | Refills: 2 | Status: SHIPPED | OUTPATIENT
Start: 2020-06-09 | End: 2021-05-20

## 2020-06-09 RX ORDER — TRIAMCINOLONE ACETONIDE 1 MG/G
CREAM TOPICAL 2 TIMES DAILY
Qty: 30 G | Refills: 2 | Status: SHIPPED | OUTPATIENT
Start: 2020-06-09 | End: 2021-06-03 | Stop reason: HOSPADM

## 2020-06-09 NOTE — PROGRESS NOTES
The ABCs of the Annual Wellness Visit  Subsequent Medicare Wellness Visit    Chief Complaint   Patient presents with   • Medicare Wellness-subsequent     no complains        Subjective   History of Present Illness:  Joi Aleman is a 74 y.o. female who presents for a Subsequent Medicare Wellness Visit.    HEALTH RISK ASSESSMENT    Recent Hospitalizations:  No hospitalization(s) within the last year.    Current Medical Providers:  Patient Care Team:  Yaneth Leal MD as PCP - General (Family Medicine)    Smoking Status:  Social History     Tobacco Use   Smoking Status Former Smoker   • Packs/day: 1.50   • Types: Cigarettes   • Last attempt to quit:    • Years since quittin.4   Smokeless Tobacco Never Used       Alcohol Consumption:  Social History     Substance and Sexual Activity   Alcohol Use Yes   • Frequency: 2-4 times a month       Depression Screen:   PHQ-2/PHQ-9 Depression Screening 2020   Little interest or pleasure in doing things 0   Feeling down, depressed, or hopeless 0   Total Score 0       Fall Risk Screen:  JEANNINE Fall Risk Assessment was completed, and patient is at LOW risk for falls.Assessment completed on:2/3/2020    Health Habits and Functional and Cognitive Screening:  Functional & Cognitive Status 2020   Do you have difficulty preparing food and eating? No   Do you have difficulty bathing yourself, getting dressed or grooming yourself? No   Do you have difficulty using the toilet? No   Do you have difficulty moving around from place to place? No   Do you have trouble with steps or getting out of a bed or a chair? No   Current Diet Well Balanced Diet   Dental Exam Not up to date   Eye Exam Up to date   Exercise (times per week) 3 times per week   Current Exercise Activities Include Gardening   Do you need help using the phone?  No   Are you deaf or do you have serious difficulty hearing?  No   Do you need help with transportation? No   Do you need help shopping? No   Do you  need help preparing meals?  No   Do you need help with housework?  No   Do you need help with laundry? No   Do you need help taking your medications? No   Do you need help managing money? No   Do you ever drive or ride in a car without wearing a seat belt? No   Have you felt unusual stress, anger or loneliness in the last month? No   Who do you live with? Alone   If you need help, do you have trouble finding someone available to you? No   Have you been bothered in the last four weeks by sexual problems? No   Do you have difficulty concentrating, remembering or making decisions? No         Does the patient have evidence of cognitive impairment? No    Asprin use counseling:Does not need ASA (and currently is not on it)    Age-appropriate Screening Schedule:  Refer to the list below for future screening recommendations based on patient's age, sex and/or medical conditions. Orders for these recommended tests are listed in the plan section. The patient has been provided with a written plan.    Health Maintenance   Topic Date Due   • MAMMOGRAM  1945   • TDAP/TD VACCINES (1 - Tdap) 08/23/1958   • ZOSTER VACCINE (1 of 2) 08/23/1997   • INFLUENZA VACCINE  08/01/2020   • LIPID PANEL  03/05/2021   • COLONOSCOPY  01/01/2023          The following portions of the patient's history were reviewed and updated as appropriate: allergies, current medications, past family history, past medical history, past social history, past surgical history and problem list.    Outpatient Medications Prior to Visit   Medication Sig Dispense Refill   • amLODIPine (NORVASC) 10 MG tablet Take 1 tablet by mouth Daily. 90 tablet 3   • atorvastatin (LIPITOR) 20 MG tablet Take 1 tablet by mouth Daily. 90 tablet 3   • loperamide (IMODIUM) 2 MG capsule Take 8 mg by mouth Daily.     • MAGNESIUM GLUCONATE PO Take 27 mg by mouth 2 (Two) Times a Day.     • metoprolol tartrate (LOPRESSOR) 25 MG tablet Take 0.5 tablets by mouth 2 (Two) Times a Day. 90  "tablet 1   • MULTIPLE VITAMINS PO Take  by mouth.     • sertraline (ZOLOFT) 100 MG tablet TAKE 2 TABLETS EVERY DAY     • vitamin B-12 (CYANOCOBALAMIN) 1000 MCG tablet Take 1,000 mcg by mouth Daily.     • traZODone (DESYREL) 100 MG tablet Take 1 tablet by mouth Every Night. 30 tablet 0     No facility-administered medications prior to visit.        Patient Active Problem List   Diagnosis   • Adaptive colitis   • Arthritis   • Avitaminosis D   • Depression   • Dermatitis seborrheica   • Essential hypertension   • HLD (hyperlipidemia)   • Pulmonary nodule   • Right groin pain   • Shortness of breath   • Coronary sinus abnormality       Advanced Care Planning:  ACP discussion was held with the patient during this visit. Patient does not have an advance directive, declines further assistance.    Review of Systems   Constitutional: Negative.    HENT: Negative.    Eyes: Negative.    Respiratory: Negative.    Cardiovascular: Negative.    Gastrointestinal: Negative.    Endocrine: Negative.    Genitourinary: Negative.    Musculoskeletal: Negative.    Skin: Negative.    Allergic/Immunologic: Negative.    Neurological: Negative.    Hematological: Negative.    Psychiatric/Behavioral: Negative.        Compared to one year ago, the patient feels her physical health is worse - worsening soa, but neg eval for health.  Compared to one year ago, the patient feels her mental health is better.    Reviewed chart for potential of high risk medication in the elderly: yes  Reviewed chart for potential of harmful drug interactions in the elderly:yes    Objective         Vitals:    06/09/20 1039   BP: 112/66   Pulse: 59   Temp: 98.2 °F (36.8 °C)   TempSrc: Oral   SpO2: 95%   Weight: 75.3 kg (166 lb)   Height: 157.5 cm (62\")   PainSc: 0-No pain       Body mass index is 30.36 kg/m².  Discussed the patient's BMI with her. The BMI is above average; BMI management plan is completed.    Physical Exam   Constitutional: She is oriented to person, " place, and time. She appears well-developed and well-nourished. No distress.   HENT:   Head: Normocephalic.   Nose: Nose normal.   Eyes: EOM are normal.   Cardiovascular: Normal rate, regular rhythm, normal heart sounds and intact distal pulses.   No murmur heard.  Pulmonary/Chest: Effort normal and breath sounds normal. No respiratory distress.   Musculoskeletal: Normal range of motion.   Neurological: She is alert and oriented to person, place, and time.   Skin: Skin is warm and dry. No rash noted.   Psychiatric: She has a normal mood and affect. Her behavior is normal. Judgment and thought content normal.   Nursing note and vitals reviewed.            Assessment/Plan   Medicare Risks and Personalized Health Plan  CMS Preventative Services Quick Reference  Advance Directive Discussion  Cardiovascular risk  Colon Cancer Screening  Dementia/Memory   Fall Risk  Immunizations Discussed/Encouraged (specific immunizations; Influenza )  Inadequate Social Support, Isolation, Loneliness, Lack of Transportation, Financial Difficulties, or Caregiver Stress   Inactivity/Sedentary  Obesity/Overweight   Osteoprorosis Risk    The above risks/problems have been discussed with the patient.  Pertinent information has been shared with the patient in the After Visit Summary.  Follow up plans and orders are seen below in the Assessment/Plan Section.    Diagnoses and all orders for this visit:    1. Medicare annual wellness visit, subsequent (Primary)    2. Encounter for screening mammogram for malignant neoplasm of breast  -     Mammo Screening Bilateral With CAD; Future    3. Postmenopausal  -     DEXA Bone Density Axial; Future    4. Primary insomnia  -     traZODone (DESYREL) 50 MG tablet; Take 2 tablets by mouth Every Night.  Dispense: 180 tablet; Refill: 2    Other orders  -     triamcinolone (KENALOG) 0.1 % cream; Apply  topically to the appropriate area as directed 2 (Two) Times a Day.  Dispense: 30 g; Refill: 2    Here for  annual exam, fasting labs discussed with pt and UTD, immunizations up-to-date, colon cancer screening UTD 2013, GYN health Due for mammogram and DEXA, cardiovascular screening neg with Dr. epps, counseled patient to increase vegetable intake, water and 150 minutes of exercise weekly combining weightbearing exercises and aerobic activity.    100mg trazodone nightly but responds to 50mg tablets doubled bettered.  Spoke to a pharmacist and told that the content of other components in the 50 mg may be why she is able to sleep better with the specific tablet..    Follow Up:  Return in about 1 month (around 7/9/2020), or if symptoms worsen or fail to improve, for Recheck shortness of breath.     An After Visit Summary and PPPS were given to the patient.

## 2020-07-07 ENCOUNTER — OFFICE VISIT (OUTPATIENT)
Dept: FAMILY MEDICINE CLINIC | Facility: CLINIC | Age: 75
End: 2020-07-07

## 2020-07-07 VITALS
BODY MASS INDEX: 31.1 KG/M2 | OXYGEN SATURATION: 98 % | SYSTOLIC BLOOD PRESSURE: 116 MMHG | TEMPERATURE: 98.3 F | DIASTOLIC BLOOD PRESSURE: 70 MMHG | HEART RATE: 48 BPM | HEIGHT: 62 IN | WEIGHT: 169 LBS

## 2020-07-07 DIAGNOSIS — R06.02 SHORTNESS OF BREATH: Primary | ICD-10-CM

## 2020-07-07 DIAGNOSIS — F33.42 RECURRENT MAJOR DEPRESSIVE DISORDER, IN FULL REMISSION (HCC): ICD-10-CM

## 2020-07-07 DIAGNOSIS — K58.0 IRRITABLE BOWEL SYNDROME WITH DIARRHEA: ICD-10-CM

## 2020-07-07 PROCEDURE — 99214 OFFICE O/P EST MOD 30 MIN: CPT | Performed by: FAMILY MEDICINE

## 2020-07-07 RX ORDER — LOPERAMIDE HYDROCHLORIDE 2 MG/1
8 CAPSULE ORAL DAILY
Qty: 360 CAPSULE | Refills: 2 | Status: SHIPPED | OUTPATIENT
Start: 2020-07-07 | End: 2021-03-04

## 2020-07-07 NOTE — PROGRESS NOTES
"    Joi Aleman is a 74 y.o. female.     Chief Complaint   Patient presents with   • Shortness of Breath     pt want to check her lungs today        Shortness of Breath   This is a chronic problem. The current episode started more than 1 month ago. The problem occurs daily. The problem has been gradually worsening. The average episode lasts 0 minutes. Pertinent negatives include no abdominal pain, chest pain, coryza, ear pain, headaches, hemoptysis, leg swelling, neck pain, PND, rash, sore throat, sputum production, syncope or vomiting. The symptoms are aggravated by any activity.    denies palpitations, chest pain, no   diarrhea - chronic    Depression - not laughing, having lots of sad events so often that she calls them \"nexts\"     The following portions of the patient's history were reviewed and updated as appropriate: allergies, current medications, past family history, past medical history, past social history, past surgical history and problem list.    Review of Systems   HENT: Negative for ear pain and sore throat.    Respiratory: Positive for shortness of breath. Negative for hemoptysis and sputum production.    Cardiovascular: Negative for chest pain, leg swelling, syncope and PND.   Gastrointestinal: Negative for abdominal pain and vomiting.   Musculoskeletal: Negative for neck pain.   Skin: Negative for rash.   Neurological: Negative for headaches.       Objective  Vitals:    07/07/20 1454   BP: 116/70   Pulse: (!) 48   Temp: 98.3 °F (36.8 °C)   SpO2: 98%        Physical Exam   Constitutional: She is oriented to person, place, and time. She appears well-developed and well-nourished. No distress.   HENT:   Head: Normocephalic.   Nose: Nose normal.   Eyes: EOM are normal.   Cardiovascular: Normal rate, regular rhythm, normal heart sounds and intact distal pulses.   No murmur heard.  Pulmonary/Chest: Effort normal and breath sounds normal. No respiratory distress.   Musculoskeletal: Normal range of " motion. She exhibits no edema.   Neurological: She is alert and oriented to person, place, and time.   Skin: Skin is warm and dry. No rash noted.   Psychiatric: She has a normal mood and affect. Her behavior is normal. Judgment and thought content normal.   Nursing note and vitals reviewed.        Current Outpatient Medications:   •  amLODIPine (NORVASC) 10 MG tablet, Take 1 tablet by mouth Daily., Disp: 90 tablet, Rfl: 3  •  atorvastatin (LIPITOR) 20 MG tablet, Take 1 tablet by mouth Daily., Disp: 90 tablet, Rfl: 3  •  loperamide (IMODIUM) 2 MG capsule, Take 4 capsules by mouth Daily., Disp: 360 capsule, Rfl: 2  •  MAGNESIUM GLUCONATE PO, Take 27 mg by mouth 2 (Two) Times a Day., Disp: , Rfl:   •  metoprolol tartrate (LOPRESSOR) 25 MG tablet, Take 0.5 tablets by mouth 2 (Two) Times a Day., Disp: 90 tablet, Rfl: 1  •  MULTIPLE VITAMINS PO, Take  by mouth., Disp: , Rfl:   •  sertraline (ZOLOFT) 100 MG tablet, TAKE 2 TABLETS EVERY DAY, Disp: , Rfl:   •  traZODone (DESYREL) 50 MG tablet, Take 2 tablets by mouth Every Night., Disp: 180 tablet, Rfl: 2  •  triamcinolone (KENALOG) 0.1 % cream, Apply  topically to the appropriate area as directed 2 (Two) Times a Day., Disp: 30 g, Rfl: 2  •  vitamin B-12 (CYANOCOBALAMIN) 1000 MCG tablet, Take 1,000 mcg by mouth Daily., Disp: , Rfl:     Procedures    Lab Results (most recent)     None              Joi was seen today for shortness of breath.    Diagnoses and all orders for this visit:    Shortness of breath    Irritable bowel syndrome with diarrhea  -     loperamide (IMODIUM) 2 MG capsule; Take 4 capsules by mouth Daily.    Recurrent major depressive disorder, in full remission (CMS/HCC)      Pleasant 74-year-old female with many unfortunate life events but is now battling depression but feeling improved recently, many episodes of diarrhea that seem to be related to stress and leaving the house,     Exertional dyspnea that seems to be related to overall debility and  sedentary life.  We discussed getting pulmonary function testing.  I described the test she declined further evaluation.  She denies cough, no history of smoking, no difficulty with taking breaths.  Her symptoms are mostly exertional dyspnea and resolve when resting.  Follow-up if not improving with gradually increasing activity as she can tolerate.  She does have a walker that she plans to walk with her daughter around the block.  We discussed rehab, patient would like to start with walking around her block first.  We will follow-up with cardiology as well to follow-up cardiac causes.      Return in about 6 months (around 1/7/2021), or if symptoms worsen or fail to improve, for Recheck depression .      Yaneth Leal MD  Answers for HPI/ROS submitted by the patient on 6/30/2020   Shortness of breath  What is the primary reason for your visit?: Shortness of Breath

## 2020-07-14 ENCOUNTER — HOSPITAL ENCOUNTER (OUTPATIENT)
Dept: MAMMOGRAPHY | Facility: HOSPITAL | Age: 75
Discharge: HOME | End: 2020-07-14
Admitting: FAMILY MEDICINE

## 2020-07-14 ENCOUNTER — HOSPITAL ENCOUNTER (OUTPATIENT)
Dept: BONE DENSITY | Facility: HOSPITAL | Age: 75
Discharge: HOME | End: 2020-07-14

## 2020-07-14 DIAGNOSIS — Z12.31 ENCOUNTER FOR SCREENING MAMMOGRAM FOR MALIGNANT NEOPLASM OF BREAST: ICD-10-CM

## 2020-07-14 DIAGNOSIS — Z78.0 POSTMENOPAUSAL: ICD-10-CM

## 2020-07-14 PROCEDURE — 77063 BREAST TOMOSYNTHESIS BI: CPT

## 2020-07-14 PROCEDURE — 77067 SCR MAMMO BI INCL CAD: CPT

## 2020-07-14 PROCEDURE — 77080 DXA BONE DENSITY AXIAL: CPT

## 2020-07-16 ENCOUNTER — TELEPHONE (OUTPATIENT)
Dept: CARDIOLOGY | Facility: CLINIC | Age: 75
End: 2020-07-16

## 2020-07-16 NOTE — TELEPHONE ENCOUNTER
----- Message from Joi Aleman sent at 7/15/2020  7:51 AM EDT -----  Regarding: Non-Urgent Medical Question  Contact: [phone]  Dr Leal suggested that I ask you about me joining a new heart patient pt class because I have so little strength and endurance.  Run out of breathe so fast. Thank you.  Joi Aleman

## 2020-07-22 NOTE — TELEPHONE ENCOUNTER
Called and discussed with pt, she is referring to cardiac rehab.  Pt had a cardiac cath recently and her pcp instructed her to contact our office to ask about cardiac rehab.  Pt states that she does not have any stamina.

## 2020-07-23 NOTE — TELEPHONE ENCOUNTER
Please let her know that she does not have any diagnoses that qualify for cardiac rehab.  It is only for coronary disease that received a stent or a bypass surgery, or people with congestive heart failure (which she does not have), or people who received a valve surgery.    There is absolutely no indication after extremely rigorous testing that her symptoms are cardiac.  Her right and left heart cath is extremely normal.    Thanks,  Dr Estrada    CC'd to Dr Leal

## 2020-09-15 RX ORDER — SERTRALINE HYDROCHLORIDE 100 MG/1
200 TABLET, FILM COATED ORAL DAILY
Qty: 90 TABLET | Refills: 2 | Status: SHIPPED | OUTPATIENT
Start: 2020-09-15 | End: 2020-10-21 | Stop reason: SDUPTHER

## 2020-09-21 ENCOUNTER — OFFICE VISIT (OUTPATIENT)
Dept: FAMILY MEDICINE CLINIC | Facility: CLINIC | Age: 75
End: 2020-09-21

## 2020-09-21 VITALS
TEMPERATURE: 97.9 F | DIASTOLIC BLOOD PRESSURE: 70 MMHG | BODY MASS INDEX: 30.36 KG/M2 | SYSTOLIC BLOOD PRESSURE: 144 MMHG | OXYGEN SATURATION: 98 % | WEIGHT: 165 LBS | HEART RATE: 56 BPM | HEIGHT: 62 IN

## 2020-09-21 DIAGNOSIS — F33.1 MODERATE EPISODE OF RECURRENT MAJOR DEPRESSIVE DISORDER (HCC): Primary | ICD-10-CM

## 2020-09-21 PROCEDURE — 99213 OFFICE O/P EST LOW 20 MIN: CPT | Performed by: FAMILY MEDICINE

## 2020-09-21 NOTE — PROGRESS NOTES
Joi Aleman is a 75 y.o. female.     Chief Complaint   Patient presents with   • Depression     pt would like to take anotrher medication zoloft is not doing good  i wore mask and face shield        HPI     Pt is a pleasant 75 y.o. YO female here for Depression.    Depression: Uncontrolled.  This is a Chronic problem.  They affirmed symptoms of increased sleep, decreased interest, false guilt, decreased concentration, increased appetite, deny psychomotor thoughts or suicidal homicidal ideation.  In the past they have been treated with Zoloft 100mg.  Adverse effects include: none.  She finds that she is sitting on the cough.     The following portions of the patient's history were reviewed and updated as appropriate: allergies, current medications, past family history, past medical history, past social history, past surgical history and problem list.    Review of Systems   Constitutional: Negative for chills, fatigue, fever and unexpected weight change.   HENT: Negative for ear pain, hearing loss, sinus pressure, sore throat and tinnitus.    Eyes: Negative for pain, discharge and redness.   Respiratory: Negative for cough, shortness of breath and wheezing.    Cardiovascular: Negative for chest pain, palpitations and leg swelling.   Gastrointestinal: Negative for abdominal pain, constipation, diarrhea and nausea.   Endocrine: Negative for cold intolerance and heat intolerance.   Genitourinary: Negative for difficulty urinating, flank pain and urgency.   Musculoskeletal: Negative for back pain, joint swelling and myalgias.   Skin: Negative for rash and wound.   Allergic/Immunologic: Negative for environmental allergies and food allergies.   Neurological: Negative for dizziness, seizures, numbness and headaches.   Hematological: Negative for adenopathy. Does not bruise/bleed easily.   Psychiatric/Behavioral: Negative for decreased concentration, dysphoric mood and sleep disturbance.        Depression   All other  systems reviewed and are negative.      Objective  Vitals:    09/21/20 1501   BP: 144/70   Pulse: 56   Temp: 97.9 °F (36.6 °C)   SpO2: 98%        Physical Exam  Vitals signs and nursing note reviewed.   Constitutional:       General: She is not in acute distress.     Appearance: She is well-developed.   HENT:      Head: Normocephalic.      Nose: Nose normal.   Cardiovascular:      Rate and Rhythm: Normal rate and regular rhythm.      Heart sounds: Normal heart sounds. No murmur.   Pulmonary:      Effort: Pulmonary effort is normal. No respiratory distress.      Breath sounds: Normal breath sounds.   Musculoskeletal: Normal range of motion.   Skin:     General: Skin is warm and dry.      Findings: No rash.   Neurological:      Mental Status: She is alert and oriented to person, place, and time.   Psychiatric:         Behavior: Behavior normal.         Thought Content: Thought content normal.         Judgment: Judgment normal.           Current Outpatient Medications:   •  amLODIPine (NORVASC) 10 MG tablet, Take 1 tablet by mouth Daily., Disp: 90 tablet, Rfl: 3  •  atorvastatin (LIPITOR) 20 MG tablet, Take 1 tablet by mouth Daily., Disp: 90 tablet, Rfl: 3  •  loperamide (IMODIUM) 2 MG capsule, Take 4 capsules by mouth Daily., Disp: 360 capsule, Rfl: 2  •  MAGNESIUM GLUCONATE PO, Take 27 mg by mouth 2 (Two) Times a Day., Disp: , Rfl:   •  metoprolol tartrate (LOPRESSOR) 25 MG tablet, Take 0.5 tablets by mouth 2 (Two) Times a Day., Disp: 90 tablet, Rfl: 1  •  MULTIPLE VITAMINS PO, Take  by mouth., Disp: , Rfl:   •  sertraline (ZOLOFT) 100 MG tablet, Take 2 tablets by mouth Daily., Disp: 90 tablet, Rfl: 2  •  traZODone (DESYREL) 50 MG tablet, Take 2 tablets by mouth Every Night., Disp: 180 tablet, Rfl: 2  •  triamcinolone (KENALOG) 0.1 % cream, Apply  topically to the appropriate area as directed 2 (Two) Times a Day., Disp: 30 g, Rfl: 2  •  vitamin B-12 (CYANOCOBALAMIN) 1000 MCG tablet, Take 1,000 mcg by mouth Daily.,  Disp: , Rfl:     Procedures    Lab Results (most recent)     None              Joi was seen today for depression.    Diagnoses and all orders for this visit:    Moderate episode of recurrent major depressive disorder (CMS/HCC)    Depression not controlled with PHQ9 11, on Zoloft 100 mg daily.  She had [employer] testing done at [facility], records not available but requested.  She would like to consult this report prior to making any medication changes.  Discussed mindfulness, exercise, thankfulness, eating healthy and starting with a counselor.  I gave her a handout for various therapists but it seems that she would likely not pursue these options right now.  She does seem to be safe, denies active suicidal ideation.    Return in about 4 weeks (around 10/19/2020), or if symptoms worsen or fail to improve, for Recheck Depression .      Yaneth Leal MD    Mask and face shield with appropriate PPE worn during patient encounter.  Answers for HPI/ROS submitted by the patient on 9/21/2020   What is the primary reason for your visit?: Other  Please describe your symptoms.: depression  Have you had these symptoms before?: Yes  How long have you been having these symptoms?: Greater than 2 weeks  Please list any medications you are currently taking for this condition.: zoloft  Please describe any probable cause for these symptoms. : my life

## 2020-10-21 ENCOUNTER — OFFICE VISIT (OUTPATIENT)
Dept: FAMILY MEDICINE CLINIC | Facility: CLINIC | Age: 75
End: 2020-10-21

## 2020-10-21 VITALS
TEMPERATURE: 98 F | WEIGHT: 166.6 LBS | OXYGEN SATURATION: 98 % | SYSTOLIC BLOOD PRESSURE: 114 MMHG | HEART RATE: 68 BPM | DIASTOLIC BLOOD PRESSURE: 68 MMHG | BODY MASS INDEX: 30.66 KG/M2 | HEIGHT: 62 IN

## 2020-10-21 DIAGNOSIS — F33.1 MODERATE EPISODE OF RECURRENT MAJOR DEPRESSIVE DISORDER (HCC): Primary | ICD-10-CM

## 2020-10-21 PROCEDURE — 99213 OFFICE O/P EST LOW 20 MIN: CPT | Performed by: FAMILY MEDICINE

## 2020-10-21 RX ORDER — SERTRALINE HYDROCHLORIDE 100 MG/1
250 TABLET, FILM COATED ORAL DAILY
Qty: 250 TABLET | Refills: 1 | Status: SHIPPED | OUTPATIENT
Start: 2020-10-21 | End: 2021-06-18 | Stop reason: SDUPTHER

## 2020-10-21 NOTE — PROGRESS NOTES
Joi Almean is a 75 y.o. female.     Chief Complaint   Patient presents with   • Depression     follow up pt taking zoloft but not doing well she will needs something else to help with depression        HPI     Pt is a pleasant 75 y.o. YO female here for Depression not well controlled, not active, not doing things, less motive.  No SI/ HI.  Some days she does not want to get out of bed but forces herself to do more.  He has been on Zoloft 200 mg daily for many years now.  Had [employer] testing but unable to afford branded meds.  Records have been requested but not received.      The following portions of the patient's history were reviewed and updated as appropriate: allergies, current medications, past family history, past medical history, past social history, past surgical history and problem list.    Review of Systems   Constitutional: Negative for chills, fatigue, fever and unexpected weight change.   HENT: Negative for ear pain, hearing loss, sinus pressure, sore throat and tinnitus.    Eyes: Negative for pain, discharge and redness.   Respiratory: Negative for cough, shortness of breath and wheezing.    Cardiovascular: Negative for chest pain, palpitations and leg swelling.   Gastrointestinal: Negative for abdominal pain, constipation, diarrhea and nausea.   Endocrine: Negative for cold intolerance and heat intolerance.   Genitourinary: Negative for difficulty urinating, flank pain and urgency.   Musculoskeletal: Negative for back pain, joint swelling and myalgias.   Skin: Negative for rash and wound.   Allergic/Immunologic: Negative for environmental allergies and food allergies.   Neurological: Negative for dizziness, seizures, numbness and headaches.   Hematological: Negative for adenopathy. Does not bruise/bleed easily.   Psychiatric/Behavioral: Positive for sleep disturbance. Negative for decreased concentration and dysphoric mood. The patient is not nervous/anxious.         Depression   All other  systems reviewed and are negative.      Objective  Vitals:    10/21/20 1103   BP: 114/68   Pulse: 68   Temp: 98 °F (36.7 °C)   SpO2: 98%        Physical Exam  Vitals signs and nursing note reviewed.   Constitutional:       General: She is not in acute distress.     Appearance: She is well-developed.   HENT:      Head: Normocephalic.      Nose: Nose normal.   Eyes:      General: No scleral icterus.  Pulmonary:      Effort: Pulmonary effort is normal. No respiratory distress.   Musculoskeletal: Normal range of motion.   Skin:     General: Skin is warm and dry.      Findings: No rash.   Neurological:      Mental Status: She is alert and oriented to person, place, and time.   Psychiatric:         Behavior: Behavior normal.         Thought Content: Thought content normal.         Judgment: Judgment normal.           Current Outpatient Medications:   •  amLODIPine (NORVASC) 10 MG tablet, Take 1 tablet by mouth Daily., Disp: 90 tablet, Rfl: 3  •  atorvastatin (LIPITOR) 20 MG tablet, Take 1 tablet by mouth Daily., Disp: 90 tablet, Rfl: 3  •  loperamide (IMODIUM) 2 MG capsule, Take 4 capsules by mouth Daily., Disp: 360 capsule, Rfl: 2  •  MAGNESIUM GLUCONATE PO, Take 27 mg by mouth 2 (Two) Times a Day., Disp: , Rfl:   •  metoprolol tartrate (LOPRESSOR) 25 MG tablet, Take 0.5 tablets by mouth 2 (Two) Times a Day., Disp: 90 tablet, Rfl: 1  •  MULTIPLE VITAMINS PO, Take  by mouth., Disp: , Rfl:   •  sertraline (ZOLOFT) 100 MG tablet, Take 2.5 tablets by mouth Daily., Disp: 250 tablet, Rfl: 1  •  traZODone (DESYREL) 50 MG tablet, Take 2 tablets by mouth Every Night., Disp: 180 tablet, Rfl: 2  •  triamcinolone (KENALOG) 0.1 % cream, Apply  topically to the appropriate area as directed 2 (Two) Times a Day., Disp: 30 g, Rfl: 2  •  vitamin B-12 (CYANOCOBALAMIN) 1000 MCG tablet, Take 1,000 mcg by mouth Daily., Disp: , Rfl:     Procedures    Lab Results (most recent)     None              Diagnoses and all orders for this visit:    1.  Moderate episode of recurrent major depressive disorder (CMS/HCC) (Primary)  -     sertraline (ZOLOFT) 100 MG tablet; Take 2.5 tablets by mouth Daily.  Dispense: 250 tablet; Refill: 1      Moderate depression not well controlled, increase Zoloft from 200 to 250 mg daily.  Continue with exercise, mindfulness, discussed counseling which she may start in January once her co pay decreases.      Return in about 6 months (around 4/21/2021), or if symptoms worsen or fail to improve, for Recheck HTN .      Yaneth Leal MD    Mask and protective eyewear worn during entire patient encounter.

## 2021-02-08 ENCOUNTER — TELEPHONE (OUTPATIENT)
Dept: FAMILY MEDICINE CLINIC | Facility: CLINIC | Age: 76
End: 2021-02-08

## 2021-02-08 NOTE — TELEPHONE ENCOUNTER
"PATIENT SAID SHE SENT A [employer] MESSAGE LAST WEEK TO DR. WARNER AND DID NOT GET A RESPONSE. SHE WANTS TO KNOW IF DR. WARNER WOULD RECOMMEND THE \"[facility] for their treatment? Can't afford surgery.\" PLEASE ADVISE.     PATIENT CALL BACK: [phone]    "

## 2021-02-08 NOTE — TELEPHONE ENCOUNTER
Please advise patient to make an appointment to discuss various options for treatment for knee arthritis

## 2021-03-01 ENCOUNTER — TELEPHONE (OUTPATIENT)
Dept: FAMILY MEDICINE CLINIC | Facility: CLINIC | Age: 76
End: 2021-03-01

## 2021-03-01 NOTE — TELEPHONE ENCOUNTER
not sure is there is any opening with any other provider please call pt if possible ,it will be better if someone else beside hub explain about short availability in dr araujo schedule and see if pt agree to see someone else

## 2021-03-01 NOTE — TELEPHONE ENCOUNTER
Caller: Joi Aleman    Relationship to patient: Self    Best call back number: [phone]    Chief complaint: INTERMITTENT EARACHE    Type of visit: OFFICE VISIT    Requested date: TOMORROW AFTERNOON    If rescheduling, when is the original appointment: N/A    Additional notes: PT DECLINED SAME DAY, AND DECLINED APPT TOMORROW AFTERNOON WITH IRAM CLEARY. SHE STATES SHE DOES NOT WANT TO SEE ANYONE BUT DR. WARNER

## 2021-03-02 DIAGNOSIS — Z23 IMMUNIZATION DUE: ICD-10-CM

## 2021-03-04 DIAGNOSIS — K58.0 IRRITABLE BOWEL SYNDROME WITH DIARRHEA: ICD-10-CM

## 2021-03-04 RX ORDER — LOPERAMIDE HYDROCHLORIDE 2 MG/1
8 CAPSULE ORAL DAILY
Qty: 360 CAPSULE | Refills: 1 | Status: SHIPPED | OUTPATIENT
Start: 2021-03-04 | End: 2021-09-14

## 2021-03-09 ENCOUNTER — OFFICE VISIT (OUTPATIENT)
Dept: FAMILY MEDICINE CLINIC | Facility: CLINIC | Age: 76
End: 2021-03-09

## 2021-03-09 VITALS
TEMPERATURE: 97.8 F | SYSTOLIC BLOOD PRESSURE: 110 MMHG | DIASTOLIC BLOOD PRESSURE: 68 MMHG | BODY MASS INDEX: 31.28 KG/M2 | WEIGHT: 170 LBS | OXYGEN SATURATION: 99 % | HEART RATE: 52 BPM | HEIGHT: 62 IN

## 2021-03-09 DIAGNOSIS — M17.11 PRIMARY OSTEOARTHRITIS OF RIGHT KNEE: Primary | ICD-10-CM

## 2021-03-09 DIAGNOSIS — E55.9 AVITAMINOSIS D: ICD-10-CM

## 2021-03-09 DIAGNOSIS — M17.12 PRIMARY OSTEOARTHRITIS OF LEFT KNEE: ICD-10-CM

## 2021-03-09 DIAGNOSIS — I10 ESSENTIAL HYPERTENSION: ICD-10-CM

## 2021-03-09 DIAGNOSIS — E78.2 MIXED HYPERLIPIDEMIA: ICD-10-CM

## 2021-03-09 PROCEDURE — 99214 OFFICE O/P EST MOD 30 MIN: CPT | Performed by: FAMILY MEDICINE

## 2021-03-09 RX ORDER — AMLODIPINE BESYLATE 10 MG/1
10 TABLET ORAL DAILY
Qty: 90 TABLET | Refills: 1 | Status: SHIPPED | OUTPATIENT
Start: 2021-03-09 | End: 2021-09-14

## 2021-03-09 NOTE — PROGRESS NOTES
"Chief Complaint  Headache (pt has headaches lately and ear pain  left side for about a month ), Hypertension (pt needs refill in one on her medication the lerger pill in beteween amlodipine or motoprolol ), and Knee Pain (pt want right knee replacement surgery )    Subjective          Joi Aleman presents to [facility]  History of Present Illness  Patient here for headaches and left ear pain that have been present for about 1 month.  It waxes and waines, it would go away for a week or wo and come back.  Did improve with alleve.No change to hearing, no enlarged lymph nodes, it is about an inch in her ear. Hypertension that is well controlled needing refills and knee pain.  Patient wondering if she should consider surgical options.  Objective   Vital Signs:   /68   Pulse 52   Temp 97.8 °F (36.6 °C)   Ht 157.5 cm (62\")   Wt 77.1 kg (170 lb)   SpO2 99%   BMI 31.09 kg/m²     Physical Exam  Vitals and nursing note reviewed.   Constitutional:       General: She is not in acute distress.     Appearance: She is well-developed.   HENT:      Head: Normocephalic.      Right Ear: Tympanic membrane normal.      Left Ear: Tympanic membrane normal.      Nose: Nose normal.   Eyes:      General: No scleral icterus.  Pulmonary:      Effort: Pulmonary effort is normal. No respiratory distress.   Musculoskeletal:         General: Normal range of motion.   Skin:     General: Skin is warm and dry.      Findings: No rash.   Neurological:      Mental Status: She is alert and oriented to person, place, and time.   Psychiatric:         Behavior: Behavior normal.         Thought Content: Thought content normal.         Judgment: Judgment normal.        Result Review :                 Assessment and Plan    Diagnoses and all orders for this visit:    1. Primary osteoarthritis of right knee (Primary)  -     Ambulatory Referral to Orthopedic Surgery    2. Essential hypertension  -     metoprolol " tartrate (LOPRESSOR) 25 MG tablet; Take 0.5 tablets by mouth 2 (Two) Times a Day.  Dispense: 90 tablet; Refill: 1  -     amLODIPine (NORVASC) 10 MG tablet; Take 1 tablet by mouth Daily.  Dispense: 90 tablet; Refill: 1  -     Comprehensive Metabolic Panel  -     Lipid Panel  -     CBC Auto Differential  -     TSH  -     T4, Free    3. Avitaminosis D  -     Vitamin D 25 Hydroxy    4. Mixed hyperlipidemia  -     Lipid Panel    5. Primary osteoarthritis of left knee  -     Ambulatory Referral to Orthopedic Surgery    Pleasant 75-year-old female here to follow-up hypertension which is well controlled, continue with same meds above and labs as above.    Osteoarthritis not well controlled.  She was recommended to have a knee replacement previously, has had multiple injections with improvement.  It is now painful for her to do almost any activities and would like to consider getting her right knee replaced and injections in her left knee.  Referral to Ortho as above.    Hyperlipidemia well-controlled on atorvastatin.      Follow Up   Return in about 6 months (around 9/9/2021), or if symptoms worsen or fail to improve, for Medicare Wellness.  Patient was given instructions and counseling regarding her condition or for health maintenance advice. Please see specific information pulled into the AVS if appropriate.  Medical assistant and I wore mask and eyewear protection during entire encounter.  Patient wore mask.    Yaneth Leal MD

## 2021-03-11 DIAGNOSIS — E78.2 MIXED HYPERLIPIDEMIA: ICD-10-CM

## 2021-03-11 RX ORDER — ATORVASTATIN CALCIUM 20 MG/1
20 TABLET, FILM COATED ORAL DAILY
Qty: 90 TABLET | Refills: 1 | Status: SHIPPED | OUTPATIENT
Start: 2021-03-11 | End: 2021-06-18 | Stop reason: SDUPTHER

## 2021-04-06 RX ORDER — TRAZODONE HYDROCHLORIDE 100 MG/1
TABLET ORAL
Qty: 90 TABLET | Refills: 0 | Status: SHIPPED | OUTPATIENT
Start: 2021-04-06 | End: 2021-06-18 | Stop reason: SDUPTHER

## 2021-04-08 ENCOUNTER — OFFICE VISIT (OUTPATIENT)
Dept: ORTHOPEDIC SURGERY | Facility: CLINIC | Age: 76
End: 2021-04-08

## 2021-04-08 VITALS — WEIGHT: 165 LBS | HEIGHT: 62 IN | BODY MASS INDEX: 30.36 KG/M2

## 2021-04-08 DIAGNOSIS — R52 PAIN: Primary | ICD-10-CM

## 2021-04-08 DIAGNOSIS — M17.11 PRIMARY OSTEOARTHRITIS OF RIGHT KNEE: ICD-10-CM

## 2021-04-08 PROCEDURE — 73562 X-RAY EXAM OF KNEE 3: CPT | Performed by: ORTHOPAEDIC SURGERY

## 2021-04-08 PROCEDURE — 99204 OFFICE O/P NEW MOD 45 MIN: CPT | Performed by: ORTHOPAEDIC SURGERY

## 2021-04-08 NOTE — PROGRESS NOTES
willowPatient: Joi Aleman  YOB: 1945 75 y.o. female  Medical Record Number: [id number]    Chief Complaints:   Chief Complaint   Patient presents with   • Left Knee - Follow-up   • Right Knee - Follow-up       History of Present Illness:Joi Aleman is a 75 y.o. female who presents with severe constant stabbing pain of the right greater than left knee.  She has undergone extensive treatment by other physicians including braces physical therapy and multiple injections.  These conservative measures are no longer helpful in controlling her symptoms.  The severe pain limits her basic activities of daily living and she can only walk very short distances of 1 block or less.    Allergies: No Known Allergies    Medications:   Current Outpatient Medications   Medication Sig Dispense Refill   • amLODIPine (NORVASC) 10 MG tablet Take 1 tablet by mouth Daily. 90 tablet 1   • atorvastatin (LIPITOR) 20 MG tablet Take 1 tablet by mouth Daily. 90 tablet 1   • loperamide (IMODIUM) 2 MG capsule TAKE 4 CAPSULES BY MOUTH DAILY. 360 capsule 1   • MAGNESIUM GLUCONATE PO Take 27 mg by mouth 2 (Two) Times a Day.     • metoprolol tartrate (LOPRESSOR) 25 MG tablet Take 0.5 tablets by mouth 2 (Two) Times a Day. 90 tablet 1   • MULTIPLE VITAMINS PO Take  by mouth.     • sertraline (ZOLOFT) 100 MG tablet Take 2.5 tablets by mouth Daily. 250 tablet 1   • traZODone (DESYREL) 100 MG tablet TAKE 1 TABLET EVERY NIGHT. 90 tablet 0   • traZODone (DESYREL) 50 MG tablet Take 2 tablets by mouth Every Night. 180 tablet 2   • triamcinolone (KENALOG) 0.1 % cream Apply  topically to the appropriate area as directed 2 (Two) Times a Day. 30 g 2   • vitamin B-12 (CYANOCOBALAMIN) 1000 MCG tablet Take 1,000 mcg by mouth Daily.       No current facility-administered medications for this visit.         The following portions of the patient's history were reviewed and updated as appropriate: allergies, current medications, past family history,  "past medical history, past social history, past surgical history and problem list.    Review of Systems:   A 14 point review of systems was performed. All systems negative except pertinent positives/negative listed in HPI above    Physical Exam:   Vitals:    04/08/21 1125   Weight: 74.8 kg (165 lb)   Height: 157.5 cm (62\")       General: A and O x 3, ASA, NAD    SCLERA:    Normal    DENTITION:   Normal  Knee:  right    ALIGNMENT:     Valgus      GAIT:    Antalgic    SKIN:    No abnormality    RANGE OF MOTION:   5-108   DEG    STRENGTH:   4  / 5    LIGAMENTS:    No varus / valgus instability.   Negative  Lachman.    MENISCUS:     Negative   Virgie       DISTAL PULSES:    Paplable    DISTAL SENSATION :   Intact    LYMPHATICS:     No   lymphadenopathy    OTHER:          - Positive   effusion      - Crepitance with ROM          Radiology:  Xrays 3views both knees (ap,lateral, sunrise) were ordered and reviewed for evaluation of knee pain demonstrating advanced varus osteoarthritis with bone on bone articulation, subchondral cysts, and periarticular osteophytes.  There is near bone-on-bone articulation of the left medial joint space.  There are no previous films for comparison.    Assessment/Plan: Advanced end-stage right greater than left knee osteoarthritis.  She has failed the full complement of conservative measures.  Continuation of conservative management vs. TKA discussed.  The patient wishes to proceed with total knee replacement.  At this point the patient has failed the full compliment of conservative treatment and stating complete understanding of the risks/benefits/ anternatives wishes to proceed with surgical treatment.    Risk and benefits of surgery were reviewed.  Including, but not limited to, blood clots or pulmonary embolism, anesthesia risk, infection, fracture, skin/leg numbness, persistent pain/crepitance/popping/catching, failure of the implant, need for future surgeries, hematoma, possible nerve " or blood vessel injury, need for transfusion, and potential risk of stroke,heart attack or death, among others.  The patient understands and wishes to proceed.     It was explained that if tissue has been repaired or reconstructed, there is also an increased chance of failure which may require further management.  Following the completion of the discussion, the patient expressed understanding of this planned course of care, all their questions were answered and consent will be obtained preoperatively.    Operative Plan: [facility] Total Knee Replacement an overnight staywith home health rehab.  At the same time we will also inject the left knee with steroid          Shamar Boone MD  4/8/2021

## 2021-04-09 PROBLEM — R52 PAIN: Status: ACTIVE | Noted: 2021-04-09

## 2021-04-09 RX ORDER — PREGABALIN 75 MG/1
150 CAPSULE ORAL ONCE
Status: CANCELLED | OUTPATIENT
Start: 2021-06-02 | End: 2021-04-09

## 2021-04-09 RX ORDER — VANCOMYCIN HYDROCHLORIDE 1 G/200ML
15 INJECTION, SOLUTION INTRAVENOUS ONCE
Status: CANCELLED | OUTPATIENT
Start: 2021-06-02 | End: 2021-04-09

## 2021-04-09 RX ORDER — CEFAZOLIN SODIUM 2 G/100ML
2 INJECTION, SOLUTION INTRAVENOUS ONCE
Status: CANCELLED | OUTPATIENT
Start: 2021-06-02 | End: 2021-04-09

## 2021-04-09 RX ORDER — CHLORHEXIDINE GLUCONATE 500 MG/1
CLOTH TOPICAL 2 TIMES DAILY
Status: CANCELLED | OUTPATIENT
Start: 2021-04-09

## 2021-04-09 RX ORDER — MELOXICAM 15 MG/1
15 TABLET ORAL ONCE
Status: CANCELLED | OUTPATIENT
Start: 2021-06-02 | End: 2021-04-09

## 2021-04-12 ENCOUNTER — TELEPHONE (OUTPATIENT)
Dept: ORTHOPEDIC SURGERY | Facility: CLINIC | Age: 76
End: 2021-04-12

## 2021-04-12 PROBLEM — M17.11 PRIMARY OSTEOARTHRITIS OF RIGHT KNEE: Status: ACTIVE | Noted: 2021-04-12

## 2021-04-12 NOTE — TELEPHONE ENCOUNTER
UNABLE TO WARM TRANSFER    Caller: EARLENE LOPEZ    Relationship to patient: SELF    Best call back number: [phone]    Patient is needing: TO SPEAK WITH SURGERY SCHEDULER FOR DR DE LEON PLEASE

## 2021-04-15 ENCOUNTER — TELEPHONE (OUTPATIENT)
Dept: ORTHOPEDIC SURGERY | Facility: CLINIC | Age: 76
End: 2021-04-15

## 2021-04-15 NOTE — TELEPHONE ENCOUNTER
Provider: DR SAAD DE LEON     Caller: EARLENE LOPEZ     Relationship to Patient: SELF    Phone Number: [phone]    Reason for Call: PT CALLED IN STATING THE PHYSICAL THERAPY THAT DR DE LEON REFERRED PT TO ([facility]) WILL NOT ACCEPT HER [employer] INSURANCE AND WILL NEED TO BE REFERRED ELSEWHERE, PLEASE ADVISE - PT STATED HAVING SX LT KNEE 6.1.2021 WITH DR DE LEON AND PHYSICAL THERAPY IS TO BE COMPLETED BY THEN

## 2021-04-15 NOTE — TELEPHONE ENCOUNTER
Spoke with pt and she is going to call her insurance to see where she can go to therapy.m  She will call back to let us know so we can fax the order.

## 2021-05-17 ENCOUNTER — TRANSCRIBE ORDERS (OUTPATIENT)
Dept: PREADMISSION TESTING | Facility: HOSPITAL | Age: 76
End: 2021-05-17

## 2021-05-17 DIAGNOSIS — Z01.818 OTHER SPECIFIED PRE-OPERATIVE EXAMINATION: Primary | ICD-10-CM

## 2021-05-20 ENCOUNTER — PRE-ADMISSION TESTING (OUTPATIENT)
Dept: PREADMISSION TESTING | Facility: HOSPITAL | Age: 76
End: 2021-05-20

## 2021-05-20 VITALS
OXYGEN SATURATION: 94 % | WEIGHT: 169 LBS | BODY MASS INDEX: 31.1 KG/M2 | HEIGHT: 62 IN | HEART RATE: 54 BPM | SYSTOLIC BLOOD PRESSURE: 139 MMHG | RESPIRATION RATE: 20 BRPM | DIASTOLIC BLOOD PRESSURE: 66 MMHG | TEMPERATURE: 97.7 F

## 2021-05-20 DIAGNOSIS — M17.11 PRIMARY OSTEOARTHRITIS OF RIGHT KNEE: ICD-10-CM

## 2021-05-20 LAB
ANION GAP SERPL CALCULATED.3IONS-SCNC: 8.3 MMOL/L (ref 5–15)
BACTERIA UR QL AUTO: ABNORMAL /HPF
BILIRUB UR QL STRIP: NEGATIVE
BUN SERPL-MCNC: 16 MG/DL (ref 8–23)
BUN/CREAT SERPL: 20 (ref 7–25)
CALCIUM SPEC-SCNC: 9 MG/DL (ref 8.6–10.5)
CHLORIDE SERPL-SCNC: 102 MMOL/L (ref 98–107)
CLARITY UR: CLEAR
CO2 SERPL-SCNC: 27.7 MMOL/L (ref 22–29)
COLOR UR: YELLOW
CREAT SERPL-MCNC: 0.8 MG/DL (ref 0.57–1)
DEPRECATED RDW RBC AUTO: 43.1 FL (ref 37–54)
ERYTHROCYTE [DISTWIDTH] IN BLOOD BY AUTOMATED COUNT: 13.1 % (ref 12.3–15.4)
GFR SERPL CREATININE-BSD FRML MDRD: 70 ML/MIN/1.73
GLUCOSE SERPL-MCNC: 99 MG/DL (ref 65–99)
GLUCOSE UR STRIP-MCNC: NEGATIVE MG/DL
HCT VFR BLD AUTO: 38.9 % (ref 34–46.6)
HGB BLD-MCNC: 12.7 G/DL (ref 12–15.9)
HGB UR QL STRIP.AUTO: NEGATIVE
HYALINE CASTS UR QL AUTO: ABNORMAL /LPF
KETONES UR QL STRIP: NEGATIVE
LEUKOCYTE ESTERASE UR QL STRIP.AUTO: ABNORMAL
MCH RBC QN AUTO: 29.7 PG (ref 26.6–33)
MCHC RBC AUTO-ENTMCNC: 32.6 G/DL (ref 31.5–35.7)
MCV RBC AUTO: 90.9 FL (ref 79–97)
NITRITE UR QL STRIP: NEGATIVE
PH UR STRIP.AUTO: 6.5 [PH] (ref 5–8)
PLATELET # BLD AUTO: 234 10*3/MM3 (ref 140–450)
PMV BLD AUTO: 9.2 FL (ref 6–12)
POTASSIUM SERPL-SCNC: 4.4 MMOL/L (ref 3.5–5.2)
PROT UR QL STRIP: NEGATIVE
QT INTERVAL: 450 MS
RBC # BLD AUTO: 4.28 10*6/MM3 (ref 3.77–5.28)
RBC # UR: ABNORMAL /HPF
REF LAB TEST METHOD: ABNORMAL
SODIUM SERPL-SCNC: 138 MMOL/L (ref 136–145)
SP GR UR STRIP: 1.01 (ref 1–1.03)
SQUAMOUS #/AREA URNS HPF: ABNORMAL /HPF
UROBILINOGEN UR QL STRIP: ABNORMAL
WBC # BLD AUTO: 6.29 10*3/MM3 (ref 3.4–10.8)
WBC UR QL AUTO: ABNORMAL /HPF

## 2021-05-20 PROCEDURE — 93005 ELECTROCARDIOGRAM TRACING: CPT

## 2021-05-20 PROCEDURE — 63710000001 MUPIROCIN 2 % OINTMENT: Performed by: ORTHOPAEDIC SURGERY

## 2021-05-20 PROCEDURE — A9270 NON-COVERED ITEM OR SERVICE: HCPCS | Performed by: ORTHOPAEDIC SURGERY

## 2021-05-20 PROCEDURE — 87086 URINE CULTURE/COLONY COUNT: CPT

## 2021-05-20 PROCEDURE — 81001 URINALYSIS AUTO W/SCOPE: CPT

## 2021-05-20 PROCEDURE — 85027 COMPLETE CBC AUTOMATED: CPT

## 2021-05-20 PROCEDURE — 93010 ELECTROCARDIOGRAM REPORT: CPT | Performed by: INTERNAL MEDICINE

## 2021-05-20 PROCEDURE — 80048 BASIC METABOLIC PNL TOTAL CA: CPT

## 2021-05-20 PROCEDURE — 36415 COLL VENOUS BLD VENIPUNCTURE: CPT

## 2021-05-20 RX ORDER — CHLORHEXIDINE GLUCONATE 500 MG/1
CLOTH TOPICAL 2 TIMES DAILY
Status: DISCONTINUED | OUTPATIENT
Start: 2021-05-20 | End: 2021-12-27

## 2021-05-20 RX ORDER — MELATONIN
1000 DAILY
COMMUNITY
End: 2021-06-03 | Stop reason: HOSPADM

## 2021-05-20 RX ORDER — CHLORHEXIDINE GLUCONATE 500 MG/1
CLOTH TOPICAL
COMMUNITY
End: 2021-06-03 | Stop reason: HOSPADM

## 2021-05-20 ASSESSMENT — KOOS JR
KOOS JR SCORE: 0
KOOS JR SCORE: 28

## 2021-05-21 ENCOUNTER — TRANSCRIBE ORDERS (OUTPATIENT)
Dept: SLEEP MEDICINE | Facility: HOSPITAL | Age: 76
End: 2021-05-21

## 2021-05-21 DIAGNOSIS — Z01.818 OTHER SPECIFIED PRE-OPERATIVE EXAMINATION: Primary | ICD-10-CM

## 2021-05-21 LAB — BACTERIA SPEC AEROBE CULT: NORMAL

## 2021-05-27 ENCOUNTER — OFFICE VISIT (OUTPATIENT)
Dept: ORTHOPEDIC SURGERY | Facility: CLINIC | Age: 76
End: 2021-05-27

## 2021-05-27 VITALS — TEMPERATURE: 97.8 F | BODY MASS INDEX: 31.12 KG/M2 | HEIGHT: 62 IN | WEIGHT: 169.09 LBS

## 2021-05-27 DIAGNOSIS — R52 PAIN: Primary | ICD-10-CM

## 2021-05-27 PROCEDURE — S0260 H&P FOR SURGERY: HCPCS | Performed by: NURSE PRACTITIONER

## 2021-05-27 PROCEDURE — 77077 JOINT SURVEY SINGLE VIEW: CPT | Performed by: ORTHOPAEDIC SURGERY

## 2021-05-27 NOTE — H&P
Patient: Joi Aleman    Date of Admission: 6/2/2021    YOB: 1945    Medical Record Number: [id number]    Admitting Physician: Dr. Shamar Boone    Reason for Admission: End Stage Right Knee OA    History of Present Illness: 75 y.o. female presents with severe end stage knee osteoarthritis which has not been responsive to the full compliment of conservative measures. Despite conservative attempts, there is still severe, constant activity limiting pain. Given the severity of the pain, the patient has elected to proceed with knee replacement.    Allergies: No Known Allergies      Current Medications:  Home Medications:    Current Outpatient Medications on File Prior to Visit   Medication Sig   • amLODIPine (NORVASC) 10 MG tablet Take 1 tablet by mouth Daily.   • atorvastatin (LIPITOR) 20 MG tablet Take 1 tablet by mouth Daily.   • Chlorhexidine Gluconate Cloth 2 % pads Apply  topically. AS DIRECTED PREOP   • cholecalciferol (VITAMIN D3) 25 MCG (1000 UT) tablet Take 1,000 Units by mouth Daily.   • loperamide (IMODIUM) 2 MG capsule TAKE 4 CAPSULES BY MOUTH DAILY.   • magnesium oxide (MAG-OX) 400 tablet tablet Take 400 mg by mouth Daily.   • metoprolol tartrate (LOPRESSOR) 25 MG tablet Take 0.5 tablets by mouth 2 (Two) Times a Day.   • MULTIPLE VITAMINS PO Take 1 tablet by mouth Daily.   • mupirocin (Bactroban Nasal) 2 % nasal ointment into the nostril(s) as directed by provider. AS DIRECTED PREOP   • sertraline (ZOLOFT) 100 MG tablet Take 2.5 tablets by mouth Daily.   • traZODone (DESYREL) 100 MG tablet TAKE 1 TABLET EVERY NIGHT.   • triamcinolone (KENALOG) 0.1 % cream Apply  topically to the appropriate area as directed 2 (Two) Times a Day.   • vitamin B-12 (CYANOCOBALAMIN) 1000 MCG tablet Take 1,000 mcg by mouth Daily.     Current Facility-Administered Medications on File Prior to Visit   Medication   • Chlorhexidine Gluconate Cloth 2 % pads     PRN Meds:.    PMH:     Past Medical History:   Diagnosis  Date   • Anxiety and depression    • Arthritis    • Brain aneurysm     WATCHING   • Frequent urination    • Hyperlipidemia    • Hypertension    • Insomnia    • Knee pain, bilateral    • Osteoarthritis    • SOB (shortness of breath) on exertion        PF/Surg/Soc Hx:     Past Surgical History:   Procedure Laterality Date   • BLEPHAROPLASTY     • CARDIAC CATHETERIZATION N/A 2020    Procedure: Coronary angiography;  Surgeon: Víctor Hernandez MD;  Location:  [facility] INVASIVE [facility];  Service: Cardiovascular;  Laterality: N/A;   • CARDIAC CATHETERIZATION N/A 2020    Procedure: Left heart cath;  Surgeon: Víctor Hernandez MD;  Location:  [facility] INVASIVE [facility];  Service: Cardiovascular;  Laterality: N/A;   • CARDIAC CATHETERIZATION N/A 2020    Procedure: Left ventriculography;  Surgeon: Víctor Hernandez MD;  Location:  [facility] INVASIVE [facility];  Service: Cardiovascular;  Laterality: N/A;   • CARDIAC CATHETERIZATION N/A 2020    Procedure: Right heart cath with shunt run;  Surgeon: Víctor Hernandez MD;  Location:  [facility] INVASIVE [facility];  Service: Cardiovascular;  Laterality: N/A;   • CARPAL TUNNEL RELEASE Right    • COLON SURGERY     • COLONOSCOPY      normal less than 10 years ago   • HYSTERECTOMY     • OOPHORECTOMY     • TOE SURGERY Right     right big toe replecemnet 12 years ago   • TONSILLECTOMY          Social History     Occupational History   • Occupation: retired    • Occupation: property closing    Tobacco Use   • Smoking status: Former Smoker     Packs/day: 1.50     Types: Cigarettes     Quit date:      Years since quittin.4   • Smokeless tobacco: Never Used   Vaping Use   • Vaping Use: Never used   Substance and Sexual Activity   • Alcohol use: Not Currently     Comment: RARE   • Drug use: Never   • Sexual activity: Defer      Social History     Social History Narrative   • Not on file        Family History   Problem Relation Age of Onset   • Breast  "cancer Mother 76   • Heart disease Father    • Heart attack Father    • Heart disease Paternal Grandfather    • Heart attack Paternal Grandfather    • No Known Problems Sister    • No Known Problems Brother    • Prostate cancer Maternal Grandfather    • Heart disease Sister    • Malig Hyperthermia Neg Hx          Review of Systems:   A 14 point review of systems was performed, pertinent positives discussed above, all other systems are negative    Physical Exam: 75 y.o. female  Vital Signs :   Vitals:    05/27/21 1338   Temp: 97.8 °F (36.6 °C)   Weight: 76.7 kg (169 lb 1.5 oz)   Height: 157.5 cm (62.01\")   PainSc:   7   PainLoc: Knee     General: Alert and Oriented x 3, No acute distress.  Psych: mood and affect appropriate; recent and remote memory intact  Eyes: conjunctiva clear; pupils equally round and reactive, sclera nonicteric  CV: no peripheral edema  Resp: normal respiratory effort  Skin: no rashes or wounds; normal turgor  Musculosketetal; pain and crepitance with knee range of motion  Vascular: palpable distal pulses    Xrays:  -3 views (AP, lateral, and sunrise) were reviewed demonstrating end-stage OA with bone on bone articulation.  -A full length AP xray was ordered and reviewed today for purposes of operative alignment demonstrating end stage arthritic findings. There are no previous full length films for review    Assessment:  End-stage Right knee osteoarthritis. Conservative measures have failed.      Plan:  The plan is to proceed with Right Total Knee Replacement. The patient voiced understanding of the risks, benefits, and alternative forms of treatment that were discussed with Dr Boone at the time of scheduling.  Patient's plan on going home with home health next day, we will inject left knee in OR, history of stroke so T acid to be given locally    Lety Barahona, APRN  5/27/2021        "

## 2021-05-31 ENCOUNTER — LAB (OUTPATIENT)
Dept: LAB | Facility: HOSPITAL | Age: 76
End: 2021-05-31

## 2021-05-31 DIAGNOSIS — Z01.818 OTHER SPECIFIED PRE-OPERATIVE EXAMINATION: ICD-10-CM

## 2021-05-31 LAB — SARS-COV-2 ORF1AB RESP QL NAA+PROBE: NOT DETECTED

## 2021-05-31 PROCEDURE — C9803 HOPD COVID-19 SPEC COLLECT: HCPCS

## 2021-05-31 PROCEDURE — U0004 COV-19 TEST NON-CDC HGH THRU: HCPCS

## 2021-06-01 ENCOUNTER — TELEPHONE (OUTPATIENT)
Dept: ORTHOPEDIC SURGERY | Facility: CLINIC | Age: 76
End: 2021-06-01

## 2021-06-02 ENCOUNTER — HOSPITAL ENCOUNTER (OUTPATIENT)
Facility: HOSPITAL | Age: 76
Discharge: HOME HEALTH | End: 2021-06-03
Attending: ORTHOPAEDIC SURGERY | Admitting: ORTHOPAEDIC SURGERY

## 2021-06-02 ENCOUNTER — APPOINTMENT (OUTPATIENT)
Dept: GENERAL RADIOLOGY | Facility: HOSPITAL | Age: 76
End: 2021-06-02

## 2021-06-02 ENCOUNTER — ANESTHESIA (OUTPATIENT)
Dept: PERIOP | Facility: HOSPITAL | Age: 76
End: 2021-06-02

## 2021-06-02 ENCOUNTER — ANESTHESIA EVENT (OUTPATIENT)
Dept: PERIOP | Facility: HOSPITAL | Age: 76
End: 2021-06-02

## 2021-06-02 DIAGNOSIS — M17.11 PRIMARY OSTEOARTHRITIS OF RIGHT KNEE: ICD-10-CM

## 2021-06-02 DIAGNOSIS — Z96.651 S/P TKR (TOTAL KNEE REPLACEMENT), RIGHT: Primary | ICD-10-CM

## 2021-06-02 PROCEDURE — 99024 POSTOP FOLLOW-UP VISIT: CPT | Performed by: NURSE PRACTITIONER

## 2021-06-02 PROCEDURE — G0378 HOSPITAL OBSERVATION PER HR: HCPCS

## 2021-06-02 PROCEDURE — 63710000001 POLYETHYLENE GLYCOL 17 G PACK: Performed by: NURSE PRACTITIONER

## 2021-06-02 PROCEDURE — A9270 NON-COVERED ITEM OR SERVICE: HCPCS | Performed by: NURSE PRACTITIONER

## 2021-06-02 PROCEDURE — C1776 JOINT DEVICE (IMPLANTABLE): HCPCS | Performed by: ORTHOPAEDIC SURGERY

## 2021-06-02 PROCEDURE — 25010000003 CEFAZOLIN IN DEXTROSE 2-4 GM/100ML-% SOLUTION: Performed by: ORTHOPAEDIC SURGERY

## 2021-06-02 PROCEDURE — 63710000001 MELOXICAM 15 MG TABLET: Performed by: ORTHOPAEDIC SURGERY

## 2021-06-02 PROCEDURE — 25010000002 METHYLPREDNISOLONE PER 40 MG: Performed by: ORTHOPAEDIC SURGERY

## 2021-06-02 PROCEDURE — 73560 X-RAY EXAM OF KNEE 1 OR 2: CPT

## 2021-06-02 PROCEDURE — 25010000003 CEFAZOLIN IN DEXTROSE 2-4 GM/100ML-% SOLUTION: Performed by: NURSE PRACTITIONER

## 2021-06-02 PROCEDURE — 25010000002 FENTANYL CITRATE (PF) 50 MCG/ML SOLUTION: Performed by: ANESTHESIOLOGY

## 2021-06-02 PROCEDURE — 63710000001 HYDROCODONE-ACETAMINOPHEN 7.5-325 MG TABLET: Performed by: NURSE ANESTHETIST, CERTIFIED REGISTERED

## 2021-06-02 PROCEDURE — 97110 THERAPEUTIC EXERCISES: CPT

## 2021-06-02 PROCEDURE — 63710000001 PREGABALIN 75 MG CAPSULE: Performed by: ORTHOPAEDIC SURGERY

## 2021-06-02 PROCEDURE — 25010000002 ROPIVACAINE PER 1 MG: Performed by: ANESTHESIOLOGY

## 2021-06-02 PROCEDURE — 76942 ECHO GUIDE FOR BIOPSY: CPT | Performed by: ORTHOPAEDIC SURGERY

## 2021-06-02 PROCEDURE — A9270 NON-COVERED ITEM OR SERVICE: HCPCS | Performed by: NURSE ANESTHETIST, CERTIFIED REGISTERED

## 2021-06-02 PROCEDURE — 25010000002 PROPOFOL 10 MG/ML EMULSION: Performed by: NURSE ANESTHETIST, CERTIFIED REGISTERED

## 2021-06-02 PROCEDURE — 25010000002 VANCOMYCIN PER 500 MG: Performed by: ORTHOPAEDIC SURGERY

## 2021-06-02 PROCEDURE — C1713 ANCHOR/SCREW BN/BN,TIS/BN: HCPCS | Performed by: ORTHOPAEDIC SURGERY

## 2021-06-02 PROCEDURE — C1889 IMPLANT/INSERT DEVICE, NOC: HCPCS | Performed by: ORTHOPAEDIC SURGERY

## 2021-06-02 PROCEDURE — 25010000002 FENTANYL CITRATE (PF) 50 MCG/ML SOLUTION: Performed by: NURSE ANESTHETIST, CERTIFIED REGISTERED

## 2021-06-02 PROCEDURE — 25010000003 BUPIVACAINE LIPOSOME 1.3 % SUSPENSION 20 ML VIAL: Performed by: ORTHOPAEDIC SURGERY

## 2021-06-02 PROCEDURE — 63710000001 MUPIROCIN 2 % OINTMENT: Performed by: NURSE PRACTITIONER

## 2021-06-02 PROCEDURE — 25010000002 ONDANSETRON PER 1 MG: Performed by: NURSE ANESTHETIST, CERTIFIED REGISTERED

## 2021-06-02 PROCEDURE — C9290 INJ, BUPIVACAINE LIPOSOME: HCPCS | Performed by: ORTHOPAEDIC SURGERY

## 2021-06-02 PROCEDURE — 25010000002 HYDRALAZINE PER 20 MG: Performed by: NURSE ANESTHETIST, CERTIFIED REGISTERED

## 2021-06-02 PROCEDURE — 25010000002 DEXAMETHASONE PER 1 MG: Performed by: NURSE ANESTHETIST, CERTIFIED REGISTERED

## 2021-06-02 PROCEDURE — A9270 NON-COVERED ITEM OR SERVICE: HCPCS | Performed by: ORTHOPAEDIC SURGERY

## 2021-06-02 PROCEDURE — 97161 PT EVAL LOW COMPLEX 20 MIN: CPT

## 2021-06-02 PROCEDURE — 25010000002 NEOSTIGMINE 5 MG/10ML SOLUTION: Performed by: NURSE ANESTHETIST, CERTIFIED REGISTERED

## 2021-06-02 PROCEDURE — 25010000003 MEPIVACAINE PER 10 ML: Performed by: ANESTHESIOLOGY

## 2021-06-02 PROCEDURE — 20610 DRAIN/INJ JOINT/BURSA W/O US: CPT | Performed by: ORTHOPAEDIC SURGERY

## 2021-06-02 PROCEDURE — 27447 TOTAL KNEE ARTHROPLASTY: CPT | Performed by: ORTHOPAEDIC SURGERY

## 2021-06-02 PROCEDURE — 25010000002 MIDAZOLAM PER 1 MG: Performed by: ANESTHESIOLOGY

## 2021-06-02 PROCEDURE — 63710000001 TRAZODONE 100 MG TABLET: Performed by: NURSE PRACTITIONER

## 2021-06-02 DEVICE — GENESIS II NON-POROUS TIBIAL                                    BASEPLATE SIZE 3 RIGHT
Type: IMPLANTABLE DEVICE | Site: KNEE | Status: FUNCTIONAL
Brand: GENESIS II

## 2021-06-02 DEVICE — LEGION POSTERIOR STABILIZED HIGH                                    FLEX HIGHLY CROSS LINKED                                    POLYETHYLENE SIZE 3-4 9MM
Type: IMPLANTABLE DEVICE | Site: KNEE | Status: FUNCTIONAL
Brand: LEGION

## 2021-06-02 DEVICE — LEGION NARROW POSTERIOR STABILIZED                                    OXINIUM SIZE 4N RIGHT
Type: IMPLANTABLE DEVICE | Site: KNEE | Status: FUNCTIONAL
Brand: LEGION

## 2021-06-02 DEVICE — KNOTLESS TISSUE CONTROL DEVICE, UNDYED UNIDIRECTIONAL (ANTIBACTERIAL) SYNTHETIC ABSORBABLE DEVICE
Type: IMPLANTABLE DEVICE | Site: KNEE | Status: FUNCTIONAL
Brand: STRATAFIX

## 2021-06-02 DEVICE — PALACOS® R IS A FAST-CURING, RADIOPAQUE, POLY(METHYL METHACRYLATE)-BASED BONE CEMENT.PALACOS ® R CONTAINS THE X-RAY CONTRAST MEDIUM ZIRCONIUM DIOXIDE. TO IMPROVE VISIBILITY IN THE SURGICAL FIELD PALACOS ® R HAS BEEN COLOURED WITH CHLOROPHYLL (E141). THE BONE CEMENT IS PREPARED DIRECTLY BEFORE USE BY MIXING A POLYMER POWDER COMPONENT WITH A LIQUID MONOMER COMPONENT. A DUCTILE DOUGH FORMS WHICH CURES WITHIN A FEW MINUTES.
Type: IMPLANTABLE DEVICE | Site: KNEE | Status: FUNCTIONAL
Brand: PALACOS®

## 2021-06-02 DEVICE — GENESIS II BICONVEX PATELLA 26MM
Type: IMPLANTABLE DEVICE | Site: KNEE | Status: FUNCTIONAL
Brand: GENESIS II

## 2021-06-02 DEVICE — VIOLET ANTIBACTERIAL POLYDIOXANONE, KNOTLESS TISSUE CONTROL DEVICE
Type: IMPLANTABLE DEVICE | Site: KNEE | Status: FUNCTIONAL
Brand: STRATAFIX

## 2021-06-02 DEVICE — IMPLANTABLE DEVICE: Type: IMPLANTABLE DEVICE | Site: KNEE | Status: FUNCTIONAL

## 2021-06-02 RX ORDER — UREA 10 %
3 LOTION (ML) TOPICAL NIGHTLY PRN
Status: DISCONTINUED | OUTPATIENT
Start: 2021-06-02 | End: 2021-06-03 | Stop reason: HOSPADM

## 2021-06-02 RX ORDER — HYDROCODONE BITARTRATE AND ACETAMINOPHEN 7.5; 325 MG/1; MG/1
1 TABLET ORAL ONCE AS NEEDED
Status: COMPLETED | OUTPATIENT
Start: 2021-06-02 | End: 2021-06-02

## 2021-06-02 RX ORDER — HYDRALAZINE HYDROCHLORIDE 20 MG/ML
INJECTION INTRAMUSCULAR; INTRAVENOUS AS NEEDED
Status: DISCONTINUED | OUTPATIENT
Start: 2021-06-02 | End: 2021-06-02 | Stop reason: SURG

## 2021-06-02 RX ORDER — NEOSTIGMINE METHYLSULFATE 0.5 MG/ML
INJECTION, SOLUTION INTRAVENOUS AS NEEDED
Status: DISCONTINUED | OUTPATIENT
Start: 2021-06-02 | End: 2021-06-02 | Stop reason: SURG

## 2021-06-02 RX ORDER — DEXAMETHASONE SODIUM PHOSPHATE 10 MG/ML
INJECTION INTRAMUSCULAR; INTRAVENOUS AS NEEDED
Status: DISCONTINUED | OUTPATIENT
Start: 2021-06-02 | End: 2021-06-02 | Stop reason: SURG

## 2021-06-02 RX ORDER — FENTANYL CITRATE 50 UG/ML
50 INJECTION, SOLUTION INTRAMUSCULAR; INTRAVENOUS
Status: DISCONTINUED | OUTPATIENT
Start: 2021-06-02 | End: 2021-06-02 | Stop reason: HOSPADM

## 2021-06-02 RX ORDER — GLYCOPYRROLATE 0.2 MG/ML
INJECTION INTRAMUSCULAR; INTRAVENOUS AS NEEDED
Status: DISCONTINUED | OUTPATIENT
Start: 2021-06-02 | End: 2021-06-02 | Stop reason: SURG

## 2021-06-02 RX ORDER — ASPIRIN 81 MG/1
81 TABLET ORAL EVERY 12 HOURS SCHEDULED
Status: DISCONTINUED | OUTPATIENT
Start: 2021-06-03 | End: 2021-06-03 | Stop reason: HOSPADM

## 2021-06-02 RX ORDER — ONDANSETRON 4 MG/1
4 TABLET, FILM COATED ORAL EVERY 8 HOURS PRN
Qty: 10 TABLET | Refills: 0 | Status: SHIPPED | OUTPATIENT
Start: 2021-06-02 | End: 2022-05-31

## 2021-06-02 RX ORDER — HYDROCODONE BITARTRATE AND ACETAMINOPHEN 7.5; 325 MG/1; MG/1
1 TABLET ORAL EVERY 4 HOURS PRN
Status: DISCONTINUED | OUTPATIENT
Start: 2021-06-02 | End: 2021-06-03 | Stop reason: HOSPADM

## 2021-06-02 RX ORDER — LIDOCAINE HYDROCHLORIDE 20 MG/ML
INJECTION, SOLUTION INFILTRATION; PERINEURAL AS NEEDED
Status: DISCONTINUED | OUTPATIENT
Start: 2021-06-02 | End: 2021-06-02 | Stop reason: SURG

## 2021-06-02 RX ORDER — IBUPROFEN 600 MG/1
600 TABLET ORAL ONCE AS NEEDED
Status: DISCONTINUED | OUTPATIENT
Start: 2021-06-02 | End: 2021-06-02 | Stop reason: HOSPADM

## 2021-06-02 RX ORDER — CEFAZOLIN SODIUM 2 G/100ML
2 INJECTION, SOLUTION INTRAVENOUS EVERY 8 HOURS
Status: COMPLETED | OUTPATIENT
Start: 2021-06-02 | End: 2021-06-03

## 2021-06-02 RX ORDER — MIDAZOLAM HYDROCHLORIDE 1 MG/ML
0.5 INJECTION INTRAMUSCULAR; INTRAVENOUS
Status: DISCONTINUED | OUTPATIENT
Start: 2021-06-02 | End: 2021-06-02 | Stop reason: HOSPADM

## 2021-06-02 RX ORDER — HYDROCODONE BITARTRATE AND ACETAMINOPHEN 7.5; 325 MG/1; MG/1
TABLET ORAL
Qty: 60 TABLET | Refills: 0 | Status: SHIPPED | OUTPATIENT
Start: 2021-06-02 | End: 2021-06-21 | Stop reason: SDUPTHER

## 2021-06-02 RX ORDER — ROPIVACAINE HYDROCHLORIDE 5 MG/ML
INJECTION, SOLUTION EPIDURAL; INFILTRATION; PERINEURAL
Status: COMPLETED | OUTPATIENT
Start: 2021-06-02 | End: 2021-06-02

## 2021-06-02 RX ORDER — ONDANSETRON 2 MG/ML
4 INJECTION INTRAMUSCULAR; INTRAVENOUS EVERY 6 HOURS PRN
Status: DISCONTINUED | OUTPATIENT
Start: 2021-06-02 | End: 2021-06-03 | Stop reason: HOSPADM

## 2021-06-02 RX ORDER — FAMOTIDINE 10 MG/ML
20 INJECTION, SOLUTION INTRAVENOUS ONCE
Status: COMPLETED | OUTPATIENT
Start: 2021-06-02 | End: 2021-06-02

## 2021-06-02 RX ORDER — TRAZODONE HYDROCHLORIDE 100 MG/1
100 TABLET ORAL NIGHTLY
Status: DISCONTINUED | OUTPATIENT
Start: 2021-06-02 | End: 2021-06-03 | Stop reason: HOSPADM

## 2021-06-02 RX ORDER — SODIUM CHLORIDE, SODIUM LACTATE, POTASSIUM CHLORIDE, CALCIUM CHLORIDE 600; 310; 30; 20 MG/100ML; MG/100ML; MG/100ML; MG/100ML
9 INJECTION, SOLUTION INTRAVENOUS CONTINUOUS
Status: DISCONTINUED | OUTPATIENT
Start: 2021-06-02 | End: 2021-06-02 | Stop reason: HOSPADM

## 2021-06-02 RX ORDER — MELOXICAM 15 MG/1
15 TABLET ORAL DAILY
Status: DISCONTINUED | OUTPATIENT
Start: 2021-06-03 | End: 2021-06-03 | Stop reason: HOSPADM

## 2021-06-02 RX ORDER — EPHEDRINE SULFATE 50 MG/ML
5 INJECTION, SOLUTION INTRAVENOUS ONCE AS NEEDED
Status: DISCONTINUED | OUTPATIENT
Start: 2021-06-02 | End: 2021-06-02 | Stop reason: HOSPADM

## 2021-06-02 RX ORDER — FLUMAZENIL 0.1 MG/ML
0.2 INJECTION INTRAVENOUS AS NEEDED
Status: DISCONTINUED | OUTPATIENT
Start: 2021-06-02 | End: 2021-06-02 | Stop reason: HOSPADM

## 2021-06-02 RX ORDER — SERTRALINE HYDROCHLORIDE 100 MG/1
200 TABLET, FILM COATED ORAL DAILY
Status: DISCONTINUED | OUTPATIENT
Start: 2021-06-03 | End: 2021-06-03 | Stop reason: HOSPADM

## 2021-06-02 RX ORDER — PROMETHAZINE HYDROCHLORIDE 12.5 MG/1
12.5 TABLET ORAL EVERY 4 HOURS PRN
Status: DISCONTINUED | OUTPATIENT
Start: 2021-06-02 | End: 2021-06-03 | Stop reason: HOSPADM

## 2021-06-02 RX ORDER — MAGNESIUM HYDROXIDE 1200 MG/15ML
LIQUID ORAL AS NEEDED
Status: DISCONTINUED | OUTPATIENT
Start: 2021-06-02 | End: 2021-06-02 | Stop reason: HOSPADM

## 2021-06-02 RX ORDER — ACETAMINOPHEN 325 MG/1
650 TABLET ORAL EVERY 6 HOURS PRN
COMMUNITY
End: 2023-01-10

## 2021-06-02 RX ORDER — FENTANYL CITRATE 50 UG/ML
INJECTION, SOLUTION INTRAMUSCULAR; INTRAVENOUS
Status: COMPLETED | OUTPATIENT
Start: 2021-06-02 | End: 2021-06-02

## 2021-06-02 RX ORDER — LOPERAMIDE HYDROCHLORIDE 2 MG/1
4 CAPSULE ORAL 3 TIMES DAILY PRN
Status: DISCONTINUED | OUTPATIENT
Start: 2021-06-02 | End: 2021-06-03 | Stop reason: HOSPADM

## 2021-06-02 RX ORDER — SODIUM CHLORIDE 0.9 % (FLUSH) 0.9 %
3 SYRINGE (ML) INJECTION EVERY 12 HOURS SCHEDULED
Status: DISCONTINUED | OUTPATIENT
Start: 2021-06-02 | End: 2021-06-02 | Stop reason: HOSPADM

## 2021-06-02 RX ORDER — ONDANSETRON 2 MG/ML
INJECTION INTRAMUSCULAR; INTRAVENOUS AS NEEDED
Status: DISCONTINUED | OUTPATIENT
Start: 2021-06-02 | End: 2021-06-02 | Stop reason: SURG

## 2021-06-02 RX ORDER — ROCURONIUM BROMIDE 10 MG/ML
INJECTION, SOLUTION INTRAVENOUS AS NEEDED
Status: DISCONTINUED | OUTPATIENT
Start: 2021-06-02 | End: 2021-06-02 | Stop reason: SURG

## 2021-06-02 RX ORDER — ONDANSETRON 4 MG/1
4 TABLET, FILM COATED ORAL EVERY 6 HOURS PRN
Status: DISCONTINUED | OUTPATIENT
Start: 2021-06-02 | End: 2021-06-03 | Stop reason: HOSPADM

## 2021-06-02 RX ORDER — LIDOCAINE HYDROCHLORIDE 10 MG/ML
0.5 INJECTION, SOLUTION EPIDURAL; INFILTRATION; INTRACAUDAL; PERINEURAL ONCE AS NEEDED
Status: DISCONTINUED | OUTPATIENT
Start: 2021-06-02 | End: 2021-06-02 | Stop reason: HOSPADM

## 2021-06-02 RX ORDER — PREGABALIN 75 MG/1
150 CAPSULE ORAL ONCE
Status: COMPLETED | OUTPATIENT
Start: 2021-06-02 | End: 2021-06-02

## 2021-06-02 RX ORDER — SODIUM CHLORIDE 0.9 % (FLUSH) 0.9 %
3-10 SYRINGE (ML) INJECTION AS NEEDED
Status: DISCONTINUED | OUTPATIENT
Start: 2021-06-02 | End: 2021-06-02 | Stop reason: HOSPADM

## 2021-06-02 RX ORDER — PROMETHAZINE HYDROCHLORIDE 25 MG/1
25 TABLET ORAL ONCE AS NEEDED
Status: DISCONTINUED | OUTPATIENT
Start: 2021-06-02 | End: 2021-06-02 | Stop reason: HOSPADM

## 2021-06-02 RX ORDER — POLYETHYLENE GLYCOL 3350 17 G/17G
17 POWDER, FOR SOLUTION ORAL 2 TIMES DAILY
Status: DISCONTINUED | OUTPATIENT
Start: 2021-06-02 | End: 2021-06-03 | Stop reason: HOSPADM

## 2021-06-02 RX ORDER — PROMETHAZINE HYDROCHLORIDE 25 MG/1
25 SUPPOSITORY RECTAL ONCE AS NEEDED
Status: DISCONTINUED | OUTPATIENT
Start: 2021-06-02 | End: 2021-06-02 | Stop reason: HOSPADM

## 2021-06-02 RX ORDER — MIDAZOLAM HYDROCHLORIDE 1 MG/ML
INJECTION INTRAMUSCULAR; INTRAVENOUS
Status: COMPLETED | OUTPATIENT
Start: 2021-06-02 | End: 2021-06-02

## 2021-06-02 RX ORDER — HYDROCODONE BITARTRATE AND ACETAMINOPHEN 7.5; 325 MG/1; MG/1
2 TABLET ORAL EVERY 4 HOURS PRN
Status: DISCONTINUED | OUTPATIENT
Start: 2021-06-02 | End: 2021-06-03 | Stop reason: HOSPADM

## 2021-06-02 RX ORDER — VANCOMYCIN HYDROCHLORIDE 1 G/200ML
15 INJECTION, SOLUTION INTRAVENOUS ONCE
Status: COMPLETED | OUTPATIENT
Start: 2021-06-02 | End: 2021-06-02

## 2021-06-02 RX ORDER — AMLODIPINE BESYLATE 10 MG/1
10 TABLET ORAL DAILY
Status: DISCONTINUED | OUTPATIENT
Start: 2021-06-03 | End: 2021-06-03 | Stop reason: HOSPADM

## 2021-06-02 RX ORDER — ONDANSETRON 2 MG/ML
4 INJECTION INTRAMUSCULAR; INTRAVENOUS ONCE AS NEEDED
Status: DISCONTINUED | OUTPATIENT
Start: 2021-06-02 | End: 2021-06-02 | Stop reason: HOSPADM

## 2021-06-02 RX ORDER — DIPHENHYDRAMINE HCL 25 MG
25 CAPSULE ORAL
Status: DISCONTINUED | OUTPATIENT
Start: 2021-06-02 | End: 2021-06-02 | Stop reason: HOSPADM

## 2021-06-02 RX ORDER — NALOXONE HCL 0.4 MG/ML
0.2 VIAL (ML) INJECTION AS NEEDED
Status: DISCONTINUED | OUTPATIENT
Start: 2021-06-02 | End: 2021-06-02 | Stop reason: HOSPADM

## 2021-06-02 RX ORDER — PANTOPRAZOLE SODIUM 40 MG/1
40 TABLET, DELAYED RELEASE ORAL DAILY
Qty: 14 TABLET | Refills: 0 | Status: SHIPPED | OUTPATIENT
Start: 2021-06-02 | End: 2021-06-16

## 2021-06-02 RX ORDER — ASPIRIN 81 MG/1
TABLET ORAL
Qty: 60 TABLET | Refills: 0 | Status: SHIPPED | OUTPATIENT
Start: 2021-06-02

## 2021-06-02 RX ORDER — LABETALOL HYDROCHLORIDE 5 MG/ML
5 INJECTION, SOLUTION INTRAVENOUS
Status: DISCONTINUED | OUTPATIENT
Start: 2021-06-02 | End: 2021-06-02 | Stop reason: HOSPADM

## 2021-06-02 RX ORDER — CEFAZOLIN SODIUM 2 G/100ML
2 INJECTION, SOLUTION INTRAVENOUS ONCE
Status: COMPLETED | OUTPATIENT
Start: 2021-06-02 | End: 2021-06-02

## 2021-06-02 RX ORDER — POLYETHYLENE GLYCOL 3350 17 G/17G
17 POWDER, FOR SOLUTION ORAL 2 TIMES DAILY
Qty: 238 G | Refills: 0 | Status: SHIPPED | OUTPATIENT
Start: 2021-06-02 | End: 2021-06-09

## 2021-06-02 RX ORDER — MELOXICAM 15 MG/1
15 TABLET ORAL ONCE
Status: COMPLETED | OUTPATIENT
Start: 2021-06-02 | End: 2021-06-02

## 2021-06-02 RX ORDER — LIDOCAINE HYDROCHLORIDE 10 MG/ML
0.5 INJECTION, SOLUTION EPIDURAL; INFILTRATION; INTRACAUDAL; PERINEURAL ONCE AS NEEDED
Status: COMPLETED | OUTPATIENT
Start: 2021-06-02 | End: 2021-06-02

## 2021-06-02 RX ORDER — FENTANYL CITRATE 50 UG/ML
INJECTION, SOLUTION INTRAMUSCULAR; INTRAVENOUS AS NEEDED
Status: DISCONTINUED | OUTPATIENT
Start: 2021-06-02 | End: 2021-06-02 | Stop reason: SURG

## 2021-06-02 RX ORDER — DIPHENHYDRAMINE HYDROCHLORIDE 50 MG/ML
12.5 INJECTION INTRAMUSCULAR; INTRAVENOUS
Status: DISCONTINUED | OUTPATIENT
Start: 2021-06-02 | End: 2021-06-02 | Stop reason: HOSPADM

## 2021-06-02 RX ORDER — HYDROMORPHONE HYDROCHLORIDE 1 MG/ML
0.5 INJECTION, SOLUTION INTRAMUSCULAR; INTRAVENOUS; SUBCUTANEOUS
Status: DISCONTINUED | OUTPATIENT
Start: 2021-06-02 | End: 2021-06-02 | Stop reason: HOSPADM

## 2021-06-02 RX ORDER — HYDRALAZINE HYDROCHLORIDE 20 MG/ML
5 INJECTION INTRAMUSCULAR; INTRAVENOUS
Status: DISCONTINUED | OUTPATIENT
Start: 2021-06-02 | End: 2021-06-02 | Stop reason: HOSPADM

## 2021-06-02 RX ORDER — OXYCODONE AND ACETAMINOPHEN 10; 325 MG/1; MG/1
1 TABLET ORAL EVERY 4 HOURS PRN
Status: DISCONTINUED | OUTPATIENT
Start: 2021-06-02 | End: 2021-06-02 | Stop reason: HOSPADM

## 2021-06-02 RX ORDER — PANTOPRAZOLE SODIUM 40 MG/1
40 TABLET, DELAYED RELEASE ORAL
Status: DISCONTINUED | OUTPATIENT
Start: 2021-06-03 | End: 2021-06-03 | Stop reason: HOSPADM

## 2021-06-02 RX ORDER — PROPOFOL 10 MG/ML
VIAL (ML) INTRAVENOUS AS NEEDED
Status: DISCONTINUED | OUTPATIENT
Start: 2021-06-02 | End: 2021-06-02 | Stop reason: SURG

## 2021-06-02 RX ADMIN — ONDANSETRON 4 MG: 2 INJECTION INTRAMUSCULAR; INTRAVENOUS at 09:42

## 2021-06-02 RX ADMIN — FAMOTIDINE 20 MG: 10 INJECTION INTRAVENOUS at 08:18

## 2021-06-02 RX ADMIN — FENTANYL CITRATE 25 MCG: 50 INJECTION INTRAMUSCULAR; INTRAVENOUS at 10:18

## 2021-06-02 RX ADMIN — GLYCOPYRROLATE 0.4 MG: 0.2 INJECTION INTRAMUSCULAR; INTRAVENOUS at 11:10

## 2021-06-02 RX ADMIN — LIDOCAINE HYDROCHLORIDE 100 MG: 20 INJECTION, SOLUTION INFILTRATION; PERINEURAL at 09:38

## 2021-06-02 RX ADMIN — MUPIROCIN 1 APPLICATION: 20 OINTMENT TOPICAL at 22:12

## 2021-06-02 RX ADMIN — FENTANYL CITRATE 50 MCG: 50 INJECTION, SOLUTION INTRAMUSCULAR; INTRAVENOUS at 11:46

## 2021-06-02 RX ADMIN — HYDROCODONE BITARTRATE AND ACETAMINOPHEN 1 TABLET: 7.5; 325 TABLET ORAL at 13:10

## 2021-06-02 RX ADMIN — VANCOMYCIN HYDROCHLORIDE 1000 MG: 1 INJECTION, SOLUTION INTRAVENOUS at 07:57

## 2021-06-02 RX ADMIN — GLYCOPYRROLATE 0.2 MG: 0.2 INJECTION INTRAMUSCULAR; INTRAVENOUS at 09:37

## 2021-06-02 RX ADMIN — HYDRALAZINE HYDROCHLORIDE 20 MG: 20 INJECTION, SOLUTION INTRAMUSCULAR; INTRAVENOUS at 10:17

## 2021-06-02 RX ADMIN — CEFAZOLIN SODIUM 2 G: 2 INJECTION, SOLUTION INTRAVENOUS at 17:04

## 2021-06-02 RX ADMIN — SODIUM CHLORIDE, POTASSIUM CHLORIDE, SODIUM LACTATE AND CALCIUM CHLORIDE 9 ML/HR: 600; 310; 30; 20 INJECTION, SOLUTION INTRAVENOUS at 08:18

## 2021-06-02 RX ADMIN — MIDAZOLAM 2 MG: 1 INJECTION INTRAMUSCULAR; INTRAVENOUS at 08:59

## 2021-06-02 RX ADMIN — POLYETHYLENE GLYCOL 3350 17 G: 17 POWDER, FOR SOLUTION ORAL at 22:12

## 2021-06-02 RX ADMIN — CEFAZOLIN SODIUM 2 G: 2 INJECTION, SOLUTION INTRAVENOUS at 09:23

## 2021-06-02 RX ADMIN — PREGABALIN 150 MG: 75 CAPSULE ORAL at 07:59

## 2021-06-02 RX ADMIN — MELOXICAM 15 MG: 15 TABLET ORAL at 07:59

## 2021-06-02 RX ADMIN — POLYETHYLENE GLYCOL 3350 17 G: 17 POWDER, FOR SOLUTION ORAL at 17:04

## 2021-06-02 RX ADMIN — ROCURONIUM BROMIDE 30 MG: 50 INJECTION INTRAVENOUS at 09:38

## 2021-06-02 RX ADMIN — FENTANYL CITRATE 25 MCG: 50 INJECTION INTRAMUSCULAR; INTRAVENOUS at 08:59

## 2021-06-02 RX ADMIN — PROPOFOL 100 MG: 10 INJECTION, EMULSION INTRAVENOUS at 09:38

## 2021-06-02 RX ADMIN — DEXAMETHASONE SODIUM PHOSPHATE 8 MG: 10 INJECTION INTRAMUSCULAR; INTRAVENOUS at 09:42

## 2021-06-02 RX ADMIN — FENTANYL CITRATE 25 MCG: 50 INJECTION INTRAMUSCULAR; INTRAVENOUS at 10:12

## 2021-06-02 RX ADMIN — ROPIVACAINE HYDROCHLORIDE 30 ML: 5 INJECTION, SOLUTION EPIDURAL; INFILTRATION; PERINEURAL at 08:54

## 2021-06-02 RX ADMIN — FENTANYL CITRATE 50 MCG: 50 INJECTION INTRAMUSCULAR; INTRAVENOUS at 08:59

## 2021-06-02 RX ADMIN — MEPIVACAINE HYDROCHLORIDE 5 ML: 15 INJECTION, SOLUTION EPIDURAL; INFILTRATION at 08:54

## 2021-06-02 RX ADMIN — LIDOCAINE HYDROCHLORIDE 0.5 ML: 10 INJECTION, SOLUTION EPIDURAL; INFILTRATION; INTRACAUDAL; PERINEURAL at 07:39

## 2021-06-02 RX ADMIN — NEOSTIGMINE METHYLSULFATE 3 MG: 0.5 INJECTION INTRAVENOUS at 11:10

## 2021-06-02 RX ADMIN — FENTANYL CITRATE 25 MCG: 50 INJECTION INTRAMUSCULAR; INTRAVENOUS at 09:38

## 2021-06-02 RX ADMIN — TRAZODONE HYDROCHLORIDE 100 MG: 100 TABLET ORAL at 22:12

## 2021-06-02 NOTE — OP NOTE
Name: Joi Aleman  YOB: 1945    DATE OF SURGERY: 6/2/2021    PREOPERATIVE DIAGNOSIS: Right knee end-stage osteoarthritis, left knee oa    POSTOPERATIVE DIAGNOSIS: Right knee end-stage osteoarthritis, left knee OA    PROCEDURE PERFORMED: Right total knee replacement, left knee steroid injection    SURGEON: Shamar Boone M.D.    ASSISTANT: JULY PEÑA    IMPLANTS: [employer]:     Implant Name Type Inv. Item Serial No.  Lot No. LRB No. Used Action   CMT BONE PALACOS R HI/VISC 1X40 - [id number] Implant CMT BONE PALACOS R HI/VISC 1X40  [facility] [id number] Right 1 Implanted   SUT CONTRL TISS STRATAFIX SPIRAL MNCRYL UD 3/0 PLS 30CM - [id number] Implant SUT CONTRL TISS STRATAFIX SPIRAL MNCRYL UD 3/0 PLS 30CM  [employer]  DIV OF J AND J [id number] Right 1 Implanted   SUT CONTRL TISS STRATAFIX SYMM PDS PLUS BELKYS CT-1 60CM - [id number] Implant SUT CONTRL TISS STRATAFIX SYMM PDS PLUS BELKYS CT-1 60CM  [employer]  DIV OF J AND J [id number] Right 1 Implanted   BASE TIB/KN GEN2 NONPOR TI SZ3 RT - [id number] Implant BASE TIB/KN GEN2 NONPOR TI SZ3 RT  [employer] [id number] Right 1 Implanted   FEM LEGION OXIN PS NRW SZ4N RT - [id number] Implant FEM LEGION OXIN PS NRW SZ4N RT  [employer] [id number] Right 1 Implanted   PAT GEN2 BICONVEX [id number] - [id number] Implant PAT GEN2 BICONVEX [id number]  [employer] [id number] Right 1 Implanted   INSRT ART LEGION PS HF XLPE SZ3TO4 9MM - [id number] Implant INSRT ART LEGION PS HF XLPE SZ3TO4 9MM  [employer] [id number] Right 1 Implanted       Estimated Blood Loss: 200cc  Specimens : none  Complications: none    DESCRIPTION OF PROCEDURE: The patient was taken to the operating room and placed in the supine position. A sequential compression device was carefully placed on the non-operative leg. Preoperative antibiotics were administered. Surgical time out was performed.  The left knee was then injected under sterile conditions with 40 mg  of Depo-Medrol and 5 cc of half percent Marcaine with epinephrine.  After adequate induction of anesthesia, the leg was prepped and draped in the usual sterile fashion, exsanguinated with an Esmarch bandage and the tourniquet inflated to 250 mmHg. A midline incision was performed followed by a medial parapatellar arthrotomy. The patella was subluxed laterally.  A portion of the fat pad, ACL, and anterior horns of the meniscus were excised. The drill hole was placed in the distal femur and the canal was the irrigated and suctioned. The IM dwayne was placed and a 5 degree distal valgus cut was performed on the femur. The femur was then sized with a sizing guide. The femoral cutting block was placed and all femoral cuts were performed. The proximal tibia was exposed. We used the extramedullary tibial cutting guide set for removal of 9mm of bone off the high side. The tibial cut was performed. The posterior horns of the menisci were excised. The posterior osteophytes were removed. Flexion extension blocks were then used to balance the knee. The tibial cut surface was then sized with the sizing templates and the tibial and femoral trial were then placed. The knee was placed in full extension and then the tibial tray rotation was then matched to the femoral rotation and marked.    Attention was then placed to the patella. The patella was noted to track centrally through range of motion. The patella was then sized with the trials. The thickness of the patella was then measured. The patella was resurfaced and the surrounding osteophytes were removed. The preoperative thickness was reproduced. The patella tracked centrally through range of motion.   At this point all trial components were removed, the knee was copiously irrigated with pulsed lavage, and the knee was injected with anesthetic cocktail solution. The cut surfaces were then dried with clean lap sponges, and the components were cemented tibia, followed by femur, then  patella. The knee was held in full extension and all excess cement was removed. The knee was held still until the cement had completely hardened. We then placed the trial polyethylene spacer which resulted in full extension and excellent flexion-extension balance. We placed the final polyethylene spacer.   The knee was then copiously irrigated. The tourniquet was then released. There was excellent hemostasis. We placed a one-eighth inch Hemovac drain. We closed the knee in multiple layers in standard fashion. Sterile dressing were applied. At the end of the case, the sponge and needle counts were reported as being correct. There were no known complications. The patient was then transported to the recovery room.      Shamar Boone M.D.

## 2021-06-02 NOTE — PLAN OF CARE
See below.    Problem: Adult Inpatient Plan of Care  Goal: Plan of Care Review  Outcome: Ongoing, Progressing  Flowsheets (Taken 6/2/2021 1640)  Progress: improving  Plan of Care Reviewed With:   patient   daughter  Outcome Summary: 75/F POD#0 right TKA.  ALOx4, 2L NC, lungs clear but diminished, BS hypoactive but improving, due to void at 1923 - 6/2/2021.  Up x1 BRP with walker/gait belt.  2+ pedal pulses noted bilaterally, no c/o numbness/tingling in operative extremity, dressing CDI.  Pain well-controlled with PO pain meds only.  PIV saline locked with intermittent IV ABX per MD orders.  D/C planning in progress, will CTM.

## 2021-06-02 NOTE — ANESTHESIA PROCEDURE NOTES
Airway  Urgency: elective    Date/Time: 6/2/2021 9:40 AM  Airway not difficult    General Information and Staff    Patient location during procedure: OR  CRNA: Cindy Christianson CRNA    Indications and Patient Condition  Indications for airway management: airway protection    Preoxygenated: yes  Mask difficulty assessment: 1 - vent by mask    Final Airway Details  Final airway type: endotracheal airway      Successful airway: ETT  Cuffed: yes   Successful intubation technique: direct laryngoscopy  Facilitating devices/methods: intubating stylet  Endotracheal tube insertion site: oral  Blade: Em  Blade size: 3  ETT size (mm): 7.0  Cormack-Lehane Classification: grade I - full view of glottis  Placement verified by: chest auscultation and capnometry   Cuff volume (mL): 8  Measured from: lips  ETT/EBT  to lips (cm): 19  Number of attempts at approach: 1    Additional Comments  Atraumatic placement of ETT, dentition unchanged from preop.

## 2021-06-02 NOTE — THERAPY EVALUATION
Patient Name: Joi Aleman  : 1945    MRN: [id number]                              Today's Date: 2021       Admit Date: 2021    Visit Dx:     ICD-10-CM ICD-9-CM   1. S/P TKR (total knee replacement), right  Z96.651 V43.65   2. Primary osteoarthritis of right knee  M17.11 715.16     Patient Active Problem List   Diagnosis   • Adaptive colitis   • Arthritis   • Avitaminosis D   • Depression   • Dermatitis seborrheica   • Essential hypertension   • HLD (hyperlipidemia)   • Pulmonary nodule   • Right groin pain   • Shortness of breath   • Coronary sinus abnormality   • Pain   • Primary osteoarthritis of right knee     Past Medical History:   Diagnosis Date   • Anxiety and depression    • Arthritis    • Brain aneurysm     WATCHING   • Frequent urination    • Hyperlipidemia    • Hypertension    • Insomnia    • Knee pain, bilateral    • Osteoarthritis    • SOB (shortness of breath) on exertion      Past Surgical History:   Procedure Laterality Date   • BLEPHAROPLASTY     • CARDIAC CATHETERIZATION N/A 2020    Procedure: Coronary angiography;  Surgeon: Víctor Hernandez MD;  Location: [facility] CATH [facility]VAS[facility];  Service: Cardiovascular;  Laterality: N/A;   • CARDIAC CATHETERIZATION N/A 2020    Procedure: Left heart cath;  Surgeon: Víctor Hernandez MD;  [facility];  Service: Cardiovascular;  Laterality: N/A;   • CARDIAC CATHETERIZATION N/A 2020    Procedure: Left ventriculography;  Surgeon: Víctor Hernandez MD;  Location:  [facility]ATH INVASIVE LOCATION;  Service: Cardiovascular;  Laterality: N/A;   • CARDIAC CATHETERIZATION N/A 2020    Procedure: Right heart cath with shunt run;  Surgeon: Víctor Hernandez MD;  Location: [facility] CATH INVASIVE LOCATION;  Service: Cardiovascular;  Laterality: N/A;   • CARPAL TUNNEL RELEASE Right    • COLON SURGERY     • COLONOSCOPY      normal less than 10 years ago   • HYSTERECTOMY     • OOPHORECTOMY     • TOE SURGERY  Right     right big toe replecemnet 12 years ago   • TONSILLECTOMY       General Information     [facility] Name 06/02/21 1432          Physical Therapy Time and Intention    Document Type  evaluation  -MD     Mode of Treatment  physical therapy  -MD     Row Name 06/02/21 1432          General Information    Patient Profile Reviewed  yes  -MD     Prior Level of Function  independent:  -MD     Row Name 06/02/21 1432          Living Environment    Lives With  alone  -MD     Row Name 06/02/21 1432          Home Main Entrance    Number of Stairs, Main Entrance  five 2 steps then 3 steps  -MD     Stair Railings, Main Entrance  none  -MD     Row Name 06/02/21 1432          Stairs Within Home, Primary    Number of Stairs, Within Home, Primary  other (see comments) 13  -MD     Stair Railings, Within Home, Primary  railing on left side (ascending)  -MD     [facility] Name 06/02/21 1432          Cognition    Orientation Status (Cognition)  oriented x 3  -MD       User Key  (r) = Recorded By, (t) = Taken By, (c) = Cosigned By    Initials Name Provider Type    Carola Phelps, PT Physical Therapist        Mobility     Row Name 06/02/21 1452          Bed Mobility    Bed Mobility  supine-sit  -MD     Supine-Sit [place] (Bed Mobility)  contact guard  -MD     Assistive Device (Bed Mobility)  bed rails;head of bed elevated  -MD     Row Name 06/02/21 1452          Sit-Stand Transfer    Sit-Stand Refugio (Transfers)  verbal cues;minimum assist (75% patient effort);2 person assist  -MD     Assistive Device (Sit-Stand Transfers)  walker, front-wheeled  -MD     Row Name 06/02/21 1452          Gait/Stairs (Locomotion)    Refugio Level (Gait)  verbal cues;minimum assist (75% patient effort);2 person assist  -MD     Assistive Device (Gait)  walker, front-wheeled  -MD     Distance in Feet (Gait)  5'  -MD     Deviations/Abnormal Patterns (Gait)  antalgic;gait speed decreased  -MD     Bilateral Gait Deviations  forward flexed posture  -MD      Comment (Gait/Stairs)  ambulation distance limited due to light headedness and dizziness  -MD     Row Name 06/02/21 1452          Mobility    Extremity Weight-bearing Status  right lower extremity  -MD     Right Lower Extremity (Weight-bearing Status)  weight-bearing as tolerated (WBAT)  -MD       User Key  (r) = Recorded By, (t) = Taken By, (c) = Cosigned By    Initials Name Provider Type    Carola Phelps, PT Physical Therapist        Obj/Interventions     Row Name 06/02/21 1453          Range of Motion Comprehensive    General Range of Motion  no range of motion deficits identified  -MD     Comment, General Range of Motion  except R LE  -MD     Row Name 06/02/21 1453          Strength Comprehensive (MMT)    General Manual Muscle Testing (MMT) Assessment  no strength deficits identified  -MD     Comment, General Manual Muscle Testing (MMT) Assessment  except R LE  -MD     Row Name 06/02/21 1453          Motor Skills    Therapeutic Exercise  -- TKA x10 reps  -MD       User Key  (r) = Recorded By, (t) = Taken By, (c) = Cosigned By    Initials Name Provider Type    Carola Phelps, PT Physical Therapist        Goals/Plan     Row Name 06/02/21 1456          Bed Mobility Goal 1 (PT)    Activity/Assistive Device (Bed Mobility Goal 1, PT)  sit to supine/supine to sit  -MD     [place] Level/Cues Needed (Bed Mobility Goal 1, PT)  supervision required  -MD     Time Frame (Bed Mobility Goal 1, PT)  1 week  -MD     Row Name 06/02/21 1456          Transfer Goal 1 (PT)    Activity/Assistive Device (Transfer Goal 1, PT)  sit-to-stand/stand-to-sit;yajaira julian  -MD     [place]ansom Level/Cues Needed (Transfer Goal 1, PT)  supervision required  -MD     Time Frame (Transfer Goal 1, PT)  1 week  -MD     Row Name 06/02/21 1456          Gait Training Goal 1 (PT)    Activity/Assistive Device (Gait Training Goal 1, PT)  gait (walking locomotion);walker, rolling  -MD     [place]ansom Level (Gait Training Goal 1, PT)  standby assist   -MD     Distance (Gait Training Goal 1, PT)  100'  -MD     Time Frame (Gait Training Goal 1, PT)  1 week  -MD     Row Name 06/02/21 1456          Stairs Goal 1 (PT)    Activity/Assistive Device (Stairs Goal 1, PT)  stairs, all skills;walker, rolling  -MD     [place] Level/Cues Needed (Stairs Goal 1, PT)  contact guard assist  -MD     Number of Stairs (Stairs Goal 1, PT)  4  -MD     Time Frame (Stairs Goal 1, PT)  1 week  -MD       User Key  (r) = Recorded By, (t) = Taken By, (c) = Cosigned By    Initials Name Provider Type    Carola Phelps, PT Physical Therapist        Clinical Impression     Row Name 06/02/21 1459          Pain    Additional Documentation  Pain Scale: FACES Pre/Post-Treatment (Group)  -MD     Row Name 06/02/21 1453          Pain Scale: Numbers Pre/Post-Treatment    Pain Intervention(s)  Repositioned;Ambulation/increased activity  -MD     Row Name 06/02/21 1459          Pain Scale: FACES Pre/Post-Treatment    Pain: FACES Scale, Pretreatment  2-->hurts little bit  -MD     Row Name 06/02/21 1450          Plan of Care Review    Outcome Summary  Pt presents s/p R TKA leading to decreased independence and compromised safety w functional mobility.  Pt will benifit from skilled PT to address mobility deficits and increase safety and independence w functional mobility.  Pt to d/c home w HHPT and assist from family.  -MD     Row Name 06/02/21 1452          Therapy Assessment/Plan (PT)    Rehab Potential (PT)  good, to achieve stated therapy goals  -MD     Criteria for Skilled Interventions Met (PT)  yes;meets criteria;skilled treatment is necessary  -MD     Row Name 06/02/21 145          Positioning and Restraints    Pre-Treatment Position  in bed  -MD     Post Treatment Position  chair  -MD     In Chair  reclined;sitting;call light within reach;exit alarm on  -MD       User Key  (r) = Recorded By, (t) = Taken By, (c) = Cosigned By    Initials Name Provider Type    Carola Phelps, PT Physical Therapist         Outcome Measures     Row Name 06/02/21 1456          How much help from another person do you currently need...    Turning from your back to your side while in flat bed without using bedrails?  3  -MD     Moving from lying on back to sitting on the side of a flat bed without bedrails?  3  -MD     Moving to and from a bed to a chair (including a wheelchair)?  3  -MD     Standing up from a chair using your arms (e.g., wheelchair, bedside chair)?  3  -MD     Climbing 3-5 steps with a railing?  3  -MD     To walk in hospital room?  3  -MD     AM-PAC 6 Clicks Score (PT)  18  -MD     Row Name 06/02/21 1456          Functional Assessment    Outcome Measure Options  AM-PAC 6 Clicks Basic Mobility (PT)  -MD       User Key  (r) = Recorded By, (t) = Taken By, (c) = Cosigned By    Initials Name Provider Type    Carola Phelps, PT Physical Therapist        Physical Therapy Education                 Title: PT OT SLP Therapies (In Progress)     Topic: Physical Therapy (In Progress)     Point: Mobility training (Not Started)     Learner Progress:  Not documented in this visit.          Point: Home exercise program (Not Started)     Learner Progress:  Not documented in this visit.          Point: Body mechanics (Not Started)     Learner Progress:  Not documented in this visit.          Point: Precautions (Done)     Learning Progress Summary           Patient Acceptance, E, VU by MD at 6/2/2021 1457                               User Key     Initials Effective Dates Name Provider Type Felecia HERNANDEZ 04/03/18 -  Carola Almaraz, PT Physical Therapist PT              PT Recommendation and Plan     Outcome Summary: Pt presents s/p R TKA leading to decreased independence and compromised safety w functional mobility.  Pt will benifit from skilled PT to address mobility deficits and increase safety and independence w functional mobility.  Pt to d/c home w HHPT and assist from family.     Time Calculation:   PT Charges     Row Name  06/02/21 1432             Time Calculation    Start Time  1432  -MD      Stop Time  1452  -MD      Time Calculation (min)  20 min  -MD      PT Received On  06/02/21  -MD      PT - Next Appointment  06/03/21  -MD      PT Goal Re-Cert Due Date  06/09/21  -MD        User Key  (r) = Recorded By, (t) = Taken By, (c) = Cosigned By    Initials Name Provider Type    Carola Phelps, PT Physical Therapist        Therapy Charges for Today     Code Description Service Date Service Provider Modifiers Qty    75527458525 HC PT EVAL LOW COMPLEXITY 2 6/2/2021 Carola Almaraz, PT GP 1    13263545286 HC PT THER PROC EA 15 MIN 6/2/2021 Carola Almaraz, PT GP 1        Patient was intermittently wearing a face mask during this therapy encounter. Therapist used appropriate personal protective equipment including eye protection, mask, and gloves.  Mask used was standard procedure mask. Appropriate PPE was worn during the entire therapy session. Hand hygiene was completed before and after therapy session. Patient is not in enhanced droplet precautions. Valeria Thompson was present throughout treatment.      PT G-Codes  Outcome Measure Options: AM-PAC 6 Clicks Basic Mobility (PT)  AM-PAC 6 Clicks Score (PT): 18    Carola Almaraz PT  6/2/2021

## 2021-06-02 NOTE — ANESTHESIA POSTPROCEDURE EVALUATION
"Patient: Joi Aleman    Procedure Summary     Date: 06/02/21 Room / Location: [facility] OR [address] [place] [address] MAIN OR    Anesthesia Start: 0928 Anesthesia Stop: 1128    Procedure: TOTAL KNEE ARTHROPLASTY, with left knee steroid injection (Right Knee) Diagnosis:       Primary osteoarthritis of right knee      (Primary osteoarthritis of right knee [M17.11])    Surgeons: Shamar Boone MD Provider: Polly Howard MD    Anesthesia Type: general with block ASA Status: 3          Anesthesia Type: general with block    Vitals  Vitals Value Taken Time   /56 06/02/21 1230   Temp 36.6 °C (97.8 °F) 06/02/21 1123   Pulse 55 06/02/21 1232   Resp 14 06/02/21 1145   SpO2 94 % 06/02/21 1232   Vitals shown include unvalidated device data.        Post Anesthesia Care and Evaluation    Patient location during evaluation: bedside  Pain management: adequate  Airway patency: patent  Anesthetic complications: No anesthetic complications    Cardiovascular status: acceptable  Respiratory status: acceptable  Hydration status: acceptable    Comments: */53   Pulse 51   Temp 36.6 °C (97.8 °F) (Oral)   Resp 14   Ht 158.8 cm (62.5\")   Wt 78.3 kg (172 lb 9.9 oz)   SpO2 93%   BMI 31.07 kg/m²         "

## 2021-06-02 NOTE — PLAN OF CARE
Goal Outcome Evaluation:        Outcome Summary: Pt presents s/p R TKA leading to decreased independence and compromised safety w functional mobility.  Pt will benifit from skilled PT to address mobility deficits and increase safety and independence w functional mobility.  Pt to d/c home w HHPT and assist from family.

## 2021-06-02 NOTE — ANESTHESIA PROCEDURE NOTES
Peripheral Block    Pre-sedation assessment completed: 6/2/2021 8:54 AM    Patient reassessed immediately prior to procedure    Patient location during procedure: holding area  Start time: 6/2/2021 8:54 AM  Stop time: 6/2/2021 9:00 AM  Reason for block: at surgeon's request and post-op pain management  Performed by  Anesthesiologist: Raphael Lamb MD  Preanesthetic Checklist  Completed: patient identified, IV checked, site marked, risks and benefits discussed, surgical consent, monitors and equipment checked, pre-op evaluation and timeout performed  Prep:  Pt Position: supine  Sterile barriers:cap, gloves, gown, mask and sterile barriers  Prep: ChloraPrep  Patient monitoring: blood pressure monitoring, continuous pulse oximetry and EKG  Procedure  Sedation:yes    Guidance:ultrasound guided  ULTRASOUND INTERPRETATION.  Using ultrasound guidance a 21 G gauge needle was placed in close proximity to the femoral nerve, at which point, under ultrasound guidance anesthetic was injected in the area of the nerve and spread of the anesthesia was seen on ultrasound in close proximity thereto.  There were no abnormalities seen on ultrasound; a digital image was taken; and the patient tolerated the procedure with no complications. Images:still images obtained    Laterality:right  Block Type:adductor canal block  Injection Technique:single-shot  Needle Type:echogenic  Needle Gauge:21 G      Medications Used: ropivacaine (NAROPIN) 0.5 % injection, 30 mL  mepivacaine (CARBOCAINE) 1.5 % injection, 5 mL      Post Assessment  Injection Assessment: negative aspiration for heme, no paresthesia on injection and incremental injection  Patient Tolerance:comfortable throughout block  Complications:no

## 2021-06-02 NOTE — ANESTHESIA PREPROCEDURE EVALUATION
Anesthesia Evaluation     Patient summary reviewed and Nursing notes reviewed                Airway   Mallampati: II  TM distance: >3 FB  Neck ROM: full  Dental      Pulmonary    (+) a smoker Former, COPD, shortness of breath,   Cardiovascular     ECG reviewed  Patient on routine beta blocker and Beta blocker given within 24 hours of surgery  Rhythm: regular  Rate: abnormal    (+) hypertension, hyperlipidemia,       Neuro/Psych  (+) psychiatric history Anxiety and Depression,     GI/Hepatic/Renal/Endo    (+) obesity,       Musculoskeletal     Abdominal    Substance History - negative use     OB/GYN negative ob/gyn ROS         Other   arthritis,                      Anesthesia Plan    ASA 3     general with block   (Right ACBx PSR for POPC    I have reviewed the patient's history with the patient and the chart, including all pertinent laboratory results and imaging. I have explained the risks of anesthesia including but not limited to dental damage, corneal abrasion, nerve injury, MI, stroke, and death. Questions asked and answered. Anesthetic plan discussed with patient and team as indicated. Patient expressed understanding of the above.  )  intravenous induction     Anesthetic plan, all risks, benefits, and alternatives have been provided, discussed and informed consent has been obtained with: patient.

## 2021-06-03 ENCOUNTER — HOME HEALTH ADMISSION (OUTPATIENT)
Dept: HOME HEALTH SERVICES | Facility: HOME HEALTHCARE | Age: 76
End: 2021-06-03

## 2021-06-03 ENCOUNTER — READMISSION MANAGEMENT (OUTPATIENT)
Dept: CALL CENTER | Facility: HOSPITAL | Age: 76
End: 2021-06-03

## 2021-06-03 VITALS
HEART RATE: 52 BPM | OXYGEN SATURATION: 95 % | HEIGHT: 63 IN | RESPIRATION RATE: 16 BRPM | SYSTOLIC BLOOD PRESSURE: 129 MMHG | BODY MASS INDEX: 30.78 KG/M2 | TEMPERATURE: 97.1 F | DIASTOLIC BLOOD PRESSURE: 75 MMHG | WEIGHT: 173.72 LBS

## 2021-06-03 LAB
HCT VFR BLD AUTO: 30.1 % (ref 34–46.6)
HGB BLD-MCNC: 10.1 G/DL (ref 12–15.9)

## 2021-06-03 PROCEDURE — A9270 NON-COVERED ITEM OR SERVICE: HCPCS | Performed by: NURSE PRACTITIONER

## 2021-06-03 PROCEDURE — 63710000001 MUPIROCIN 2 % OINTMENT: Performed by: NURSE PRACTITIONER

## 2021-06-03 PROCEDURE — 25010000003 CEFAZOLIN IN DEXTROSE 2-4 GM/100ML-% SOLUTION: Performed by: NURSE PRACTITIONER

## 2021-06-03 PROCEDURE — 63710000001 SERTRALINE 100 MG TABLET: Performed by: NURSE PRACTITIONER

## 2021-06-03 PROCEDURE — 63710000001 MELOXICAM 15 MG TABLET: Performed by: NURSE PRACTITIONER

## 2021-06-03 PROCEDURE — G0378 HOSPITAL OBSERVATION PER HR: HCPCS

## 2021-06-03 PROCEDURE — 63710000001 HYDROCODONE-ACETAMINOPHEN 7.5-325 MG TABLET: Performed by: NURSE PRACTITIONER

## 2021-06-03 PROCEDURE — 85014 HEMATOCRIT: CPT | Performed by: NURSE PRACTITIONER

## 2021-06-03 PROCEDURE — 97110 THERAPEUTIC EXERCISES: CPT

## 2021-06-03 PROCEDURE — 63710000001 METOPROLOL TARTRATE 25 MG TABLET: Performed by: NURSE PRACTITIONER

## 2021-06-03 PROCEDURE — 63710000001 ASPIRIN 81 MG TABLET DELAYED-RELEASE: Performed by: NURSE PRACTITIONER

## 2021-06-03 PROCEDURE — 85018 HEMOGLOBIN: CPT | Performed by: NURSE PRACTITIONER

## 2021-06-03 PROCEDURE — 97116 GAIT TRAINING THERAPY: CPT

## 2021-06-03 RX ADMIN — METOPROLOL TARTRATE 12.5 MG: 25 TABLET, FILM COATED ORAL at 09:10

## 2021-06-03 RX ADMIN — HYDROCODONE BITARTRATE AND ACETAMINOPHEN 2 TABLET: 7.5; 325 TABLET ORAL at 09:10

## 2021-06-03 RX ADMIN — HYDROCODONE BITARTRATE AND ACETAMINOPHEN 2 TABLET: 7.5; 325 TABLET ORAL at 13:40

## 2021-06-03 RX ADMIN — MELOXICAM 15 MG: 15 TABLET ORAL at 09:11

## 2021-06-03 RX ADMIN — ASPIRIN 81 MG: 81 TABLET, COATED ORAL at 09:11

## 2021-06-03 RX ADMIN — SERTRALINE 200 MG: 100 TABLET, FILM COATED ORAL at 09:11

## 2021-06-03 RX ADMIN — CEFAZOLIN SODIUM 2 G: 2 INJECTION, SOLUTION INTRAVENOUS at 00:48

## 2021-06-03 RX ADMIN — MUPIROCIN 1 APPLICATION: 20 OINTMENT TOPICAL at 09:11

## 2021-06-03 NOTE — THERAPY TREATMENT NOTE
Patient Name: Joi Aleman  : 1945    MRN: [id number]                              Today's Date: 6/3/2021       Admit Date: 2021    Visit Dx:     ICD-10-CM ICD-9-CM   1. S/P TKR (total knee replacement), right  Z96.651 V43.65   2. Primary osteoarthritis of right knee  M17.11 715.16     Patient Active Problem List   Diagnosis   • Adaptive colitis   • Arthritis   • Avitaminosis D   • Depression   • Dermatitis seborrheica   • Essential hypertension   • HLD (hyperlipidemia)   • Pulmonary nodule   • Right groin pain   • Shortness of breath   • Coronary sinus abnormality   • Pain   • Primary osteoarthritis of right knee     Past Medical History:   Diagnosis Date   • Anxiety and depression    • Arthritis    • Brain aneurysm     WATCHING   • Frequent urination    • Hyperlipidemia    • Hypertension    • Insomnia    • Knee pain, bilateral    • Osteoarthritis    • SOB (shortness of breath) on exertion      Past Surgical History:   Procedure Laterality Date   • BLEPHAROPLASTY     • CARDIAC CATHETERIZATION N/A 2020    Procedure: Coronary angiography;  Surgeon: Víctor Hernandez MD;  Location: [facility] CATH INVASIVE LOCATION;  Service: Cardiovascular;  Laterality: N/A;   • CARDIAC CATHETERIZATION N/A 2020    Procedure: Left heart cath;  Surgeon: Víctor Hernandez MD;  Location:  [facility] CATH INVASIVE LOCATION;  Service: Cardiovascular;  Laterality: N/A;   • CARDIAC CATHETERIZATION N/A 2020    Procedure: Left ventriculography;  Surgeon: Víctor Hernandez MD;  Location:  [facility] CATH INVASIVE LOCATION;  Service: Cardiovascular;  Laterality: N/A;   • CARDIAC CATHETERIZATION N/A 2020    Procedure: Right heart cath with shunt run;  Surgeon: Víctor Hernandez MD;  Location: [facility] CATH INVASIVE LOCATION;  Service: Cardiovascular;  Laterality: N/A;   • CARPAL TUNNEL RELEASE Right    • COLON SURGERY     • COLONOSCOPY      normal less than 10 years ago   • HYSTERECTOMY     • OOPHORECTOMY     • TOE SURGERY  Right     right big toe replecemnet 12 years ago   • TONSILLECTOMY     • TOTAL KNEE ARTHROPLASTY Right 6/2/2021    Procedure: TOTAL KNEE ARTHROPLASTY, with left knee steroid injection;  Surgeon: Shamar Boone MD;  Location: [facility];  Service: Orthopedics;  Laterality: Right;     General Information     Row Name 06/03/21 0930          Physical Therapy Time and Intention    Document Type  therapy note (daily note)  -MD     Mode of Treatment  physical therapy  -MD     Row Name 06/03/21 0930          Cognition    Orientation Status (Cognition)  oriented x 3  -MD       User Key  (r) = Recorded By, (t) = Taken By, (c) = Cosigned By    Initials Name Provider Type    Carola Phleps, PT Physical Therapist        Mobility     Row Name 06/03/21 0948          Sit-Stand Transfer    Sit-Stand McIntosh (Transfers)  verbal cues;contact guard  -MD     Assistive Device (Sit-Stand Transfers)  walker, front-wheeled  -MD     Row Name 06/03/21 0948          Gait/Stairs (Locomotion)    McIntosh Level (Gait)  verbal cues;contact guard  -MD     Assistive Device (Gait)  walker, front-wheeled  -MD     Distance in Feet (Gait)  80'  -MD     Deviations/Abnormal Patterns (Gait)  antalgic;gait speed decreased  -MD     Bilateral Gait Deviations  forward flexed posture  -MD     McIntosh Level (Stairs)  verbal cues;minimum assist (75% patient effort)  -MD     Assistive Device (Stairs)  cane, quad  -MD     Handrail Location (Stairs)  none  -MD     Number of Steps (Stairs)  4  -MD     Ascending Technique (Stairs)  step-to-step  -MD     Descending Technique (Stairs)  step-to-step  -MD     Comment (Gait/Stairs)  Pt states no steps once inside home only 4 to enter w no HR.  -MD     Row Name 06/03/21 0948          Mobility    Extremity Weight-bearing Status  right lower extremity  -MD     Right Lower Extremity (Weight-bearing Status)  weight-bearing as tolerated (WBAT)  -MD       User Key  (r) = Recorded By, (t) = Taken By, (c) =  Cosigned By    Initials Name Provider Type    Carola Phelps, PT Physical Therapist        Obj/Interventions     Row Name 06/03/21 0931          Motor Skills    Therapeutic Exercise  -- TKA x15 reps  -MD       User Key  (r) = Recorded By, (t) = Taken By, (c) = Cosigned By    Initials Name Provider Type    Carola Phelps, PT Physical Therapist        Goals/Plan    No documentation.       Clinical Impression     Row Name 06/03/21 0931          Pain    Additional Documentation  Pain Scale: Numbers Pre/Post-Treatment (Group)  -MD     Row Name 06/03/21 0931          Pain Scale: Numbers Pre/Post-Treatment    Pretreatment Pain Rating  7/10  -MD     Pain Location - Side  Right  -MD     Pain Location  knee  -MD     Pain Intervention(s)  Repositioned;Ambulation/increased activity  -MD     Row Name 06/03/21 0931          Plan of Care Review    Outcome Summary  Pt is demonstrating safety w transfers, ambulation, and stairs this am.  Pt has RWx at home.  Plan to d/c home w HHPT and assist from family.  -MD     Row Name 06/03/21 0931          Positioning and Restraints    Pre-Treatment Position  sitting in chair/recliner  -MD     Post Treatment Position  chair  -MD     In Chair  reclined;sitting;call light within reach;exit alarm on  -MD       User Key  (r) = Recorded By, (t) = Taken By, (c) = Cosigned By    Initials Name Provider Type    Carola Phelps, PT Physical Therapist        Outcome Measures     Row Name 06/03/21 0933          How much help from another person do you currently need...    Turning from your back to your side while in flat bed without using bedrails?  3  -MD     Moving from lying on back to sitting on the side of a flat bed without bedrails?  3  -MD     Moving to and from a bed to a chair (including a wheelchair)?  3  -MD     Standing up from a chair using your arms (e.g., wheelchair, bedside chair)?  3  -MD     Climbing 3-5 steps with a railing?  3  -MD     To walk in hospital room?  3  -MD     AM-PAC 6 Clicks  Score (PT)  18  -MD     Row Name 06/03/21 0933          Functional Assessment    Outcome Measure Options  AM-PAC 6 Clicks Basic Mobility (PT)  -MD       User Key  (r) = Recorded By, (t) = Taken By, (c) = Cosigned By    Initials Name Provider Type    Carola Phelps, PT Physical Therapist        Physical Therapy Education                 Title: PT OT SLP Therapies (In Progress)     Topic: Physical Therapy (In Progress)     Point: Mobility training (Not Started)     Learner Progress:  Not documented in this visit.          Point: Home exercise program (Not Started)     Learner Progress:  Not documented in this visit.          Point: Body mechanics (Not Started)     Learner Progress:  Not documented in this visit.          Point: Precautions (Done)     Learning Progress Summary           Patient Acceptance, E, VU by MD at 6/3/2021 0933    Acceptance, E, VU by MD at 6/2/2021 1457                               User Key     Initials Effective Dates Name Provider Type Felecia HERNANDEZ 04/03/18 -  Carola Almaraz PT Physical Therapist PT              PT Recommendation and Plan     Outcome Summary: Pt is demonstrating safety w transfers, ambulation, and stairs this am.  Pt has RWx at home.  Plan to d/c home w HHPT and assist from family.     Time Calculation:   PT Charges     Row Name 06/03/21 0930             Time Calculation    Start Time  0930  -MD      Stop Time  0954  -MD      Time Calculation (min)  24 min  -MD      PT Received On  06/03/21  -MD        User Key  (r) = Recorded By, (t) = Taken By, (c) = Cosigned By    Initials Name Provider Type    Carola Phelps PT Physical Therapist        Therapy Charges for Today     Code Description Service Date Service Provider Modifiers Qty    69954507480 HC PT EVAL LOW COMPLEXITY 2 6/2/2021 Carola Almaraz, PT GP 1    05481476636 HC PT THER PROC EA 15 MIN 6/2/2021 Carola Almaraz, PT GP 1    56824138807 HC PT THER PROC EA 15 MIN 6/3/2021 Carola Almaraz, PT GP 1    21354268976 HC GAIT TRAINING EA 15  MIN 6/3/2021 Carola Almaraz, PT GP 1    66561290318  PT THER SUPP EA 15 MIN 6/3/2021 Carola Almaraz, PT GP 1        Patient was intermittently wearing a face mask during this therapy encounter. Therapist used appropriate personal protective equipment including eye protection, mask, and gloves.  Mask used was standard procedure mask. Appropriate PPE was worn during the entire therapy session. Hand hygiene was completed before and after therapy session. Patient is not in enhanced droplet precautions.           PT G-Codes  Outcome Measure Options: AM-PAC 6 Clicks Basic Mobility (PT)  AM-PAC 6 Clicks Score (PT): 18    Carola Almaraz PT  6/3/2021

## 2021-06-03 NOTE — OUTREACH NOTE
Prep Survey      Responses   Congregational facility patient discharged from?  [place]   Is LACE score < 7 ?  Yes   Emergency Room discharge w/ pulse ox?  No   Eligibility  [facility]   Date of Admission  06/02/21   Date of Discharge  06/03/21   Discharge Disposition  [place] [facility]   Discharge diagnosis  TOTAL KNEE ARTHROPLASTY   Does the patient have one of the following disease processes/diagnoses(primary or secondary)?  Total Joint Replacement   Does the patient have Home health ordered?  No   Is there a DME ordered?  No   Prep survey completed?  Yes          Carola Gonzalez RN

## 2021-06-03 NOTE — PLAN OF CARE
Goal Outcome Evaluation:        Outcome Summary: Pt is demonstrating safety w transfers, ambulation, and stairs this am.  Pt has RWx at home.  Plan to d/c home w HHPT and assist from family.

## 2021-06-03 NOTE — DISCHARGE SUMMARY
Patient Name: Joi Aleman  Patient YOB: 1945    Date of Admission:  6/2/2021  Date of Discharge:  6/3/2021  Discharge Diagnosis: AL TOTAL KNEE ARTHROPLASTY [36365] (TOTAL KNEE ARTHROPLASTY, with left knee steroid injection)  Presenting Problem/History of Present Illness: Primary osteoarthritis of right knee [M17.11]  Pain [R52]  Admitting Physician: Dr Shamar Boone  Consults:   Consults     No orders found for last 30 day(s).          DETAILS OF HOSPITAL STAY:  Patient is a 75 y.o. female was admitted to the floor following the above procedure and underwent an uncomplicated hospital stay.  Patient did well with physical therapy and was ambulating well without problems.  On the day of discharge the wound was clean, dry and intact and calf was soft and non tender and Homans sign was negative.  Patient was tolerating   Diet Instructions     Advance Diet as Tolerated      May advance diet as tolerated while in hospital.    Diet:      Diet Texture / Consistency: Regular       without problems.  Patient will be discharged home.    Condition on Discharge:  Stable    Vital Signs  Temp:  [96.9 °F (36.1 °C)-98.9 °F (37.2 °C)] 97.1 °F (36.2 °C)  Heart Rate:  [41-66] 52  Resp:  [8-20] 16  BP: (102-149)/(53-89) 129/75    LABS:      Admission on 06/02/2021   Component Date Value Ref Range Status   • Hemoglobin 06/03/2021 10.1* 12.0 - 15.9 g/dL Final   • Hematocrit 06/03/2021 30.1* 34.0 - 46.6 % Final       No results found.    Discharge Medications     Discharge Medications      New Medications      Instructions Start Date   Adult Aspirin Regimen 81 MG EC tablet  Generic drug: aspirin   Take 1 tablet twice daily x 14 days; then take 1 daily x 28 days      ClearLax 17 GM/SCOOP powder  Generic drug: polyethylene glycol   Mix one capful in liquid and take by mouth 2 (Two) Times a Day for 7 days      HYDROcodone-acetaminophen 7.5-325 MG per tablet  Commonly known as: NORCO   take 1-2 tablets by mouth every  4-6 hours  as needed pain      ondansetron 4 MG tablet  Commonly known as: Zofran   Take 1 tablet by mouth Every 8 (Eight) Hours As Needed for Nausea or Vomiting      pantoprazole 40 MG EC tablet  Commonly known as: PROTONIX   40 mg, Oral, Daily         Changes to Medications      Instructions Start Date   sertraline 100 MG tablet  Commonly known as: ZOLOFT  What changed: how much to take   250 mg, Oral, Daily         Continue These Medications      Instructions Start Date   acetaminophen 325 MG tablet  Commonly known as: TYLENOL   650 mg, Oral, Every 6 Hours PRN      amLODIPine 10 MG tablet  Commonly known as: NORVASC   10 mg, Oral, Daily      atorvastatin 20 MG tablet  Commonly known as: LIPITOR   20 mg, Oral, Daily      loperamide 2 MG capsule  Commonly known as: IMODIUM   8 mg, Oral, Daily      magnesium oxide 400 tablet tablet  Commonly known as: MAG-OX   400 mg, Oral, Daily      metoprolol tartrate 25 MG tablet  Commonly known as: LOPRESSOR   12.5 mg, Oral, 2 Times Daily      traZODone 100 MG tablet  Commonly known as: DESYREL   TAKE 1 TABLET EVERY NIGHT.         Stop These Medications    Bactroban Nasal 2 % nasal ointment  Generic drug: mupirocin     Chlorhexidine Gluconate Cloth 2 % pads     cholecalciferol 25 MCG (1000 UT) tablet  Commonly known as: VITAMIN D3     multivitamin tablet tablet  Commonly known as: THERAGRAN     triamcinolone 0.1 % cream  Commonly known as: KENALOG     vitamin B-12 1000 MCG tablet  Commonly known as: CYANOCOBALAMIN            Discharge Instructions: Patient is to continue with physical therapy exercises daily and continue working with the physical therapist as ordered. Patient may weight bear as tolerated. Apply ice regularly. Patient may ice for long periods of time as long as ice is not directly on the skin. Patient instructed on frequent calf pumping exercises.  Patient also instructed on incentive spirometer during hospitalization and encouraged to continue to use at home regularly.     Dressing: The dressing is designed to be left in place until you return to the office in 2 weeks.  The suction unit should stop functioning at 7 days and the green light will switch to yellow.  At that point the suction unit and tubing can be disconnected at the port closest to the dressing.  The suction unit and tubing may be discarded.  You may shower immediately upon return home, you will need to turn the pump off by depressing the orange button once and then you may disconnect the pump and tubing at the connection port.  After showering, shake off the excess water and reattach the tubing.  Restart the pump by depressing the orange button one time and you will notice the green light flashing again.  If the dressing becomes disloged or saturated it should be changed. Please refer to the KASSANDRA information sheet if you have any questions about the dressing.  If you have a home health nurse or therapist they can be contacted to assist with dressing change or repair. You may also call the KASSANDRA dressing hotline for questions related to the dressing ([phone]). If there still other problems or questions related to the dressing despite these measures then you can contact Ana at our office [phone].  If for some reason the KASSANDRA dressing is removed, after 7 days the wound can be gently cleaned with antibacterial soap then allowed to dry and covered with a dry sterile dressing. The wound should be covered at all times except while showering.  Patient may change dressings daily and prn using sterile 4x4 and paper tape, and should call if any unusual drainage, redness or swelling.*  Follow up appointment in 2 weeks - patient to call the office at [phone] to schedule.  Patient will be discharged on aspirin 81mg BID x weeks, then daily x 4weeks    Discharge Diagnosis:    Patient Active Problem List   Diagnosis   • Adaptive colitis   • Arthritis   • Avitaminosis D   • Depression   • Dermatitis seborrheica   •  Essential hypertension   • HLD (hyperlipidemia)   • Pulmonary nodule   • Right groin pain   • Shortness of breath   • Coronary sinus abnormality   • Pain   • Primary osteoarthritis of right knee       Follow-up Appointments  Future Appointments   Date Time Provider Department Center   6/17/2021  3:10 PM Shamar Boone MD [facility] L[facility]   8/18/2021  9:15 AM [facility] RM 1 [facility]   8/18/2021 10:00 AM Thomas Estrada MD [facility]          Lety Barahona, APRN  06/03/21  07:45 EDT

## 2021-06-03 NOTE — PLAN OF CARE
Goal Outcome Evaluation:  Plan of Care Reviewed With: patient      Pt is a post op day 1 of a rt total knee. Dressing is clean dry and intact. Pt continues with the ACE wrap, HV and KASSANDRA. Green light flashing. Pt has ambulated to the bathroom with an assist x1. Voiding function intact. Pt educated on the importance of monitoring blood sugars related to comorbidity of HTN. Pt voiced understanding. Pt is resting at this time, will continue to monitor.

## 2021-06-03 NOTE — PROGRESS NOTES
Continued Stay Note  [facility]     Patient Name: Joi Aleman  MRN: [id number]  Today's Date: 6/3/2021    Admit Date: 6/2/2021    Discharge Plan     Row Name 06/03/21 1211       Plan    Plan  [facility]    Patient/Family in Agreement with Plan  yes    Plan Comments  Spoke with pt, verified correct information on facesheet and explained the role of CCP. Pt would like to d/c home with [facility], referral was sent in [facility] and notified Jenniffer/[facility]. Plans are to d/c home with [facility] and family support. No other needs identified.    Final Discharge Disposition Code  06 - home with home health care    Final Note  Pt to d/c home with [facility] and family support        Discharge Codes    No documentation.       Expected Discharge Date and Time     Expected Discharge Date Expected Discharge Time    Lio 3, 2021             Dayna Vega RN

## 2021-06-03 NOTE — DISCHARGE PLACEMENT REQUEST
"Earlene Aleman (75 y.o. Female)     Date of Birth Social Security Number Address Home Phone MRN    1945  [address] [id number] [phone] [id number]    [employer] Marital Status          [employer] Single       Admission Date Admission Type Admitting Provider Attending Provider Department, Room/Bed    6/2/21 Elective Shamar Boone MD Brown, Reid B, MD [address], P792/1    Discharge Date Discharge Disposition Discharge Destination         [place] [facility] [place]              Attending Provider: Shamar Boone MD    Allergies: No Known Allergies    Isolation: None   Infection: None   Code Status: Not on file    Ht: 158.8 cm (62.5\")   Wt: 78.8 kg (173 lb 11.6 oz)    Admission Cmt: None   Principal Problem: Primary osteoarthritis of right knee [M17.11] More...                 Active Insurance as of 6/2/2021     Primary Coverage     Payor Plan Insurance Group Employer/Plan Group    HUMANA MEDICARE REPLACEMENT HUMANA MEDICARE REPLACEMENT [id number]     Payor Plan Address Payor Plan Phone Number Payor Plan Fax Number Effective Dates    [address] [address] [phone]  1/1/2018 - None Entered    [facility] [address]       Subscriber Name Subscriber Birth Date Member ID       EARLENE ALEMAN 1945 [id number]                 Emergency Contacts      (Rel.) Home Phone Work Phone Mobile Phone    PATRICIA ROBERTO (Daughter) [phone] -- --          "

## 2021-06-04 ENCOUNTER — HOME CARE VISIT (OUTPATIENT)
Dept: HOME HEALTH SERVICES | Facility: HOME HEALTHCARE | Age: 76
End: 2021-06-04

## 2021-06-04 ENCOUNTER — TRANSITIONAL CARE MANAGEMENT TELEPHONE ENCOUNTER (OUTPATIENT)
Dept: CALL CENTER | Facility: HOSPITAL | Age: 76
End: 2021-06-04

## 2021-06-04 VITALS — BODY MASS INDEX: 30.6 KG/M2 | TEMPERATURE: 98.4 F | WEIGHT: 170 LBS | RESPIRATION RATE: 17 BRPM | HEART RATE: 78 BPM

## 2021-06-04 PROCEDURE — G0151 HHCP-SERV OF PT,EA 15 MIN: HCPCS

## 2021-06-04 NOTE — OUTREACH NOTE
Call Center TCM Note      Responses   [facility] patient discharged from?  [facility]   Does the patient have one of the following disease processes/diagnoses(primary or secondary)?  Total Joint Replacement   Joint surgery performed?  Knee   TCM attempt successful?  Yes   Call start time  1433   Call end time  1437   Discharge diagnosis  TOTAL KNEE ARTHROPLASTY   Does the patient have all medications related to this admission filled (includes all antibiotics, pain medications, etc.)  Yes   Is the patient taking all medications as directed (includes completed medication regime)?  Yes   Does the patient have a follow up appointment with their surgeon?  Yes [6/17/21]   Has the patient kept scheduled appointments due by today?  N/A   Comments  There was no hospital d/c f/u appt available with PCP. Routed to PCP office.    Psychosocial issues?  No   Has the patient began therapy sessions (either in the home or as an out patient)?  Yes   Has the patient fallen since discharge?  No   Did the patient receive a copy of their discharge instructions?  Yes   Nursing interventions  Reviewed instructions with patient   What is the patient's perception of their functional status since discharge?  Improving   Is the patient able to teach back signs and symptoms of infection?  Temp >100.4 for 24h or longer   Is the patient able to teach back signs and symptoms of DVT?  Redness in calf, Swelling in calf, Severe pain in calf, Area hot to touch, Shortness of breath or chest pain   Is the patient able to teach back home safety measures?  Modifications with ADLs such as dressing, cooking, toileting   Did the patient implement home safety suggestions from pre-surgery classes if attended?  Yes   If the patient is a current smoker, are they able to teach back resources for cessation?  Not a smoker   Is the patient/caregiver able to teach back the hierarchy of who to call/visit for symptoms/problems? PCP, Specialist, Home health nurse,  Urgent Care, ED, 911  Yes   TCM call completed?  Yes          Concepción Quan, RN    6/4/2021, 14:37 EDT

## 2021-06-06 ENCOUNTER — NURSE TRIAGE (OUTPATIENT)
Dept: CALL CENTER | Facility: HOSPITAL | Age: 76
End: 2021-06-06

## 2021-06-06 NOTE — TELEPHONE ENCOUNTER
"States she had a knee replacement and came home Wednesday. States she runs a temperature every night of approximately 101. States she wakes up sweaty. Denies any fever during the day. She has not called the surgeon or told home health.     States no purulent drainage, no redness.     Reason for Disposition  • Fever > 100.4 F (38.0 C)    Additional Information  • Negative: Sounds like a life-threatening emergency to the triager  • Negative: Chest pain  • Negative: Difficulty breathing  • Negative: Acting confused (e.g., disoriented, slurred speech) or excessively sleepy  • Negative: Surgical incision symptoms and questions  • Negative: [1] Discomfort (pain, burning or stinging) when passing urine AND [2] male  • Negative: [1] Discomfort (pain, burning or stinging) when passing urine AND [2] female  • Negative: Constipation  • Negative: New or worsening leg (calf, thigh) pain  • Negative: New or worsening leg swelling  • Negative: Dizziness is severe, or persists > 24 hours after surgery  • Negative: Pain, redness, swelling, or pus at IV Site  • Negative: Symptoms arising from use of a urinary catheter (Ag or Coude)  • Negative: Cast problems or questions  • Negative: Medication question  • Negative: [1] Widespread rash AND [2] bright red, sunburn-like  • Negative: [1] SEVERE headache AND [2] after spinal (epidural) anesthesia  • Negative: [1] Vomiting AND [2] persists > 4 hours  • Negative: [1] Vomiting AND [2] abdomen looks much more swollen than usual  • Negative: [1] Drinking very little AND [2] dehydration suspected (e.g., no urine > 12 hours, very dry mouth, very lightheaded)  • Negative: Patient sounds very sick or weak to the triager  • Negative: Sounds like a serious complication to the triager    Answer Assessment - Initial Assessment Questions  1. SYMPTOM: \"What's the main symptom you're concerned about?\" (e.g., pain, fever, vomiting)      fever  2. ONSET: \"When did see note  start?\"      After coming " "home and happens nightly  3. SURGERY: \"What surgery was performed?\"      See note  4. DATE of SURGERY: \"When was surgery performed?\"       n/a  5. ANESTHESIA: \" What type of anesthesia did you have?\" (e.g., general, spinal, epidural, local)      n/a  6. PAIN: \"Is there any pain?\" If so, ask: \"How bad is it?\"  (Scale 1-10; or mild, moderate, severe)      n/a  7. FEVER: \"Do you have a fever?\" If so, ask: \"What is your temperature, how was it measured, and when did it start?\"      n/a  8. VOMITING: \"Is there any vomiting?\" If yes, ask: \"How many times?\"      n/a  9. BLEEDING: \"Is there any bleeding?\" If so, ask: \"How much?\" and \"Where?\"      n/a  10. OTHER SYMPTOMS: \"Do you have any other symptoms?\" (e.g., drainage from wound, painful urination, constipation)        n/a    Protocols used: POST-OP SYMPTOMS AND QUESTIONS-ADULT-AH      "

## 2021-06-07 ENCOUNTER — HOME CARE VISIT (OUTPATIENT)
Dept: HOME HEALTH SERVICES | Facility: HOME HEALTHCARE | Age: 76
End: 2021-06-07

## 2021-06-07 VITALS
OXYGEN SATURATION: 94 % | SYSTOLIC BLOOD PRESSURE: 120 MMHG | TEMPERATURE: 96.8 F | DIASTOLIC BLOOD PRESSURE: 58 MMHG | HEART RATE: 72 BPM | RESPIRATION RATE: 18 BRPM

## 2021-06-07 PROCEDURE — G0151 HHCP-SERV OF PT,EA 15 MIN: HCPCS

## 2021-06-07 NOTE — CASE MANAGEMENT/SOCIAL WORK
Case Management Discharge Note      Final Note: DC with family and Mosque          Selected Continued Care - Discharged on 6/3/2021 Admission date: 6/2/2021 - Discharge disposition: Home-Health Care Svc    Destination    No services have been selected for the patient.              Durable Medical Equipment    No services have been selected for the patient.              Dialysis/Infusion    No services have been selected for the patient.              Home Medical Care Coordination complete    Service Provider Selected Services Address Phone Fax Patient Preferred    [facility]  Home Health Services [address] [address] [phone] --          Therapy    No services have been selected for the patient.              Community Resources    No services have been selected for the patient.                       Final Discharge Disposition Code: 06 - home with home health care

## 2021-06-07 NOTE — PROGRESS NOTES
I scheduled the patient for a hospital follow up on 6/18/21. Outside of TCM guidelines, Pt added to wait list.

## 2021-06-09 ENCOUNTER — HOME CARE VISIT (OUTPATIENT)
Dept: HOME HEALTH SERVICES | Facility: HOME HEALTHCARE | Age: 76
End: 2021-06-09

## 2021-06-09 PROCEDURE — G0151 HHCP-SERV OF PT,EA 15 MIN: HCPCS

## 2021-06-10 VITALS
TEMPERATURE: 97.8 F | HEART RATE: 68 BPM | SYSTOLIC BLOOD PRESSURE: 132 MMHG | RESPIRATION RATE: 17 BRPM | DIASTOLIC BLOOD PRESSURE: 70 MMHG

## 2021-06-11 ENCOUNTER — HOME CARE VISIT (OUTPATIENT)
Dept: HOME HEALTH SERVICES | Facility: HOME HEALTHCARE | Age: 76
End: 2021-06-11

## 2021-06-11 VITALS
RESPIRATION RATE: 16 BRPM | DIASTOLIC BLOOD PRESSURE: 68 MMHG | HEART RATE: 70 BPM | TEMPERATURE: 97.9 F | SYSTOLIC BLOOD PRESSURE: 134 MMHG

## 2021-06-11 PROCEDURE — G0151 HHCP-SERV OF PT,EA 15 MIN: HCPCS

## 2021-06-14 ENCOUNTER — HOME CARE VISIT (OUTPATIENT)
Dept: HOME HEALTH SERVICES | Facility: HOME HEALTHCARE | Age: 76
End: 2021-06-14

## 2021-06-14 VITALS
HEART RATE: 70 BPM | TEMPERATURE: 97.5 F | SYSTOLIC BLOOD PRESSURE: 126 MMHG | DIASTOLIC BLOOD PRESSURE: 74 MMHG | RESPIRATION RATE: 17 BRPM

## 2021-06-14 PROCEDURE — G0151 HHCP-SERV OF PT,EA 15 MIN: HCPCS

## 2021-06-16 ENCOUNTER — HOME CARE VISIT (OUTPATIENT)
Dept: HOME HEALTH SERVICES | Facility: HOME HEALTHCARE | Age: 76
End: 2021-06-16

## 2021-06-16 VITALS
HEART RATE: 68 BPM | TEMPERATURE: 97.7 F | DIASTOLIC BLOOD PRESSURE: 64 MMHG | SYSTOLIC BLOOD PRESSURE: 128 MMHG | RESPIRATION RATE: 17 BRPM

## 2021-06-16 PROCEDURE — G0151 HHCP-SERV OF PT,EA 15 MIN: HCPCS

## 2021-06-17 ENCOUNTER — OFFICE VISIT (OUTPATIENT)
Dept: ORTHOPEDIC SURGERY | Facility: CLINIC | Age: 76
End: 2021-06-17

## 2021-06-17 VITALS — HEIGHT: 63 IN | WEIGHT: 165 LBS | BODY MASS INDEX: 29.23 KG/M2 | TEMPERATURE: 97.8 F

## 2021-06-17 DIAGNOSIS — Z96.651 STATUS POST RIGHT KNEE REPLACEMENT: Primary | ICD-10-CM

## 2021-06-17 PROCEDURE — 99024 POSTOP FOLLOW-UP VISIT: CPT | Performed by: ORTHOPAEDIC SURGERY

## 2021-06-18 ENCOUNTER — OFFICE VISIT (OUTPATIENT)
Dept: FAMILY MEDICINE CLINIC | Facility: CLINIC | Age: 76
End: 2021-06-18

## 2021-06-18 VITALS
BODY MASS INDEX: 30.36 KG/M2 | DIASTOLIC BLOOD PRESSURE: 64 MMHG | WEIGHT: 165 LBS | TEMPERATURE: 98.7 F | HEART RATE: 65 BPM | HEIGHT: 62 IN | OXYGEN SATURATION: 97 % | SYSTOLIC BLOOD PRESSURE: 106 MMHG

## 2021-06-18 DIAGNOSIS — E78.2 MIXED HYPERLIPIDEMIA: ICD-10-CM

## 2021-06-18 DIAGNOSIS — F33.1 MODERATE EPISODE OF RECURRENT MAJOR DEPRESSIVE DISORDER (HCC): ICD-10-CM

## 2021-06-18 DIAGNOSIS — F51.01 PRIMARY INSOMNIA: Primary | ICD-10-CM

## 2021-06-18 PROCEDURE — 99214 OFFICE O/P EST MOD 30 MIN: CPT | Performed by: FAMILY MEDICINE

## 2021-06-18 RX ORDER — SERTRALINE HYDROCHLORIDE 100 MG/1
200 TABLET, FILM COATED ORAL DAILY
Qty: 180 TABLET | Refills: 1 | Status: SHIPPED | OUTPATIENT
Start: 2021-06-18 | End: 2022-03-22

## 2021-06-18 RX ORDER — TRAZODONE HYDROCHLORIDE 100 MG/1
100 TABLET ORAL NIGHTLY
Qty: 90 TABLET | Refills: 1 | Status: SHIPPED | OUTPATIENT
Start: 2021-06-18 | End: 2021-12-13

## 2021-06-18 RX ORDER — ATORVASTATIN CALCIUM 20 MG/1
20 TABLET, FILM COATED ORAL DAILY
Qty: 90 TABLET | Refills: 1 | Status: SHIPPED | OUTPATIENT
Start: 2021-06-18 | End: 2022-03-22

## 2021-06-18 NOTE — PROGRESS NOTES
"Chief Complaint  Depression (follow op no comoplains doing well ), Insomnia (follow up needs refill on trazodone ), and Knee Pain (pt had suergery she is doing well went to surgeon yesterday )    Subjective          Joi Aleman presents to [facility]  History of Present Illness  Pleasant 75-year-old female here for depression which is doing well on Zoloft, taking 200 mg daily.    Insomnia, stable on trazodone.      Patient also had knee surgery 2 weeks ago, total right knee replacement, saw Dr. Boone yesterday, records reviewed, staples were removed and Steri-Strips applied, started physical therapy and aspirin for the next month.  She is doing well.  She had some bruising behind the right calf that seems to be gradually improving.  She is taking 2 aspirins 81 daily for DVT prophylaxis and ambulating.  She has regained 108 degrees of knee flexion.    Objective   Vital Signs:   /64   Pulse 65   Temp 98.7 °F (37.1 °C)   Ht 157.5 cm (62\")   Wt 74.8 kg (165 lb)   SpO2 97%   BMI 30.18 kg/m²     Physical Exam  Vitals and nursing note reviewed.   Constitutional:       General: She is not in acute distress.     Appearance: She is well-developed.   HENT:      Head: Normocephalic.      Nose: Nose normal.   Eyes:      General: No scleral icterus.  Pulmonary:      Effort: Pulmonary effort is normal. No respiratory distress.   Musculoskeletal:         General: Normal range of motion.   Skin:     General: Skin is warm and dry.      Findings: No rash.      Comments: Incision on right knee with 6 Steri-Strips in place, healing well with no signs of infection, no erythema or tenderness, posterior calf is somewhat tender, no warmth.   Neurological:      Mental Status: She is alert and oriented to person, place, and time.   Psychiatric:         Behavior: Behavior normal.         Thought Content: Thought content normal.         Judgment: Judgment normal.        Result Review :            "      Assessment and Plan    Diagnoses and all orders for this visit:    1. Primary insomnia (Primary)  -     traZODone (DESYREL) 100 MG tablet; Take 1 tablet by mouth Every Night.  Dispense: 90 tablet; Refill: 1    2. Moderate episode of recurrent major depressive disorder (CMS/HCC)  -     sertraline (ZOLOFT) 100 MG tablet; Take 2 tablets by mouth Daily.  Dispense: 180 tablet; Refill: 1    3. Mixed hyperlipidemia  -     atorvastatin (LIPITOR) 20 MG tablet; Take 1 tablet by mouth Daily.  Dispense: 90 tablet; Refill: 1    Pleasant 75-year-old female here for insomnia that is well controlled on trazodone, depression that is well controlled on Zoloft 200 mg daily and hyperlipidemia well-controlled on atorvastatin.  Labs ordered previously to be done with next appointment.  Patient has recently had right total knee replacement, saw Dr. Boone yesterday and has improved well.  No adverse effects.  She does have some calf soreness that she attributes to bruising and feels that it is gradually improving.  Her physical therapist agrees.  She is taking baby aspirin for DVT prophylaxis.  We discussed if this is not improving or seems to worsen that we should evaluate for DVT with ultrasound.  For now it does not seem likely and is on appropriate DVT prophylaxis.      Follow Up   Return in about 6 months (around 12/18/2021), or if symptoms worsen or fail to improve, for Annual Exam with fasting labs prior.  Patient was given instructions and counseling regarding her condition or for health maintenance advice. Please see specific information pulled into the AVS if appropriate. Medical assistant and I wore mask and eyewear protection during entire encounter.  Patient wore mask.    Yaneth Leal MD

## 2021-06-21 DIAGNOSIS — Z96.651 S/P TKR (TOTAL KNEE REPLACEMENT), RIGHT: ICD-10-CM

## 2021-06-21 RX ORDER — HYDROCODONE BITARTRATE AND ACETAMINOPHEN 7.5; 325 MG/1; MG/1
TABLET ORAL
Qty: 40 TABLET | Refills: 0 | Status: SHIPPED | OUTPATIENT
Start: 2021-06-21 | End: 2021-06-22 | Stop reason: SDUPTHER

## 2021-06-21 NOTE — TELEPHONE ENCOUNTER
Caller: EARLENE JOHN    Relationship: SELF    Best call back number: [phone]    Medication needed:   Requested Prescriptions     Refused Prescriptions Disp Refills   • HYDROcodone-acetaminophen (NORCO) 7.5-325 MG per tablet 60 tablet 0     Sig: take 1-2 tablets by mouth every  4-6 hours as needed pain     Refused By: STEFANIE ANG       When do you need the refill by: ASAP      What additional details did the patient provide when requesting the medication: PATIENT IS OUT OF MEDICATION- WAS TOLD AT APPT 6/17/21 DR DE LEON WOULD PUT IN  A REFILL- NEEDS TO BE SENT TO [facility]    Does the patient have less than a 3 day supply:  [x] Yes  [] No    What is the patient's preferred pharmacy:      [facility] [address] [address]   [facility]

## 2021-06-21 NOTE — TELEPHONE ENCOUNTER
Caller:  EARLENE LOPEZ    Relationship: SELF    Best call back number: [phone]    PATIENT STATED THAT PHARMACY STILL DOESN'T HAVE REFILL & WOULD LIKE A CALL BACK TO CONFIRM PRESCRIPTION IS BEING SENT TO PHARMACY TO BE REFILLED TODAY.

## 2021-06-22 ENCOUNTER — HOSPITAL ENCOUNTER (OUTPATIENT)
Dept: PHYSICAL THERAPY | Facility: HOSPITAL | Age: 76
Setting detail: THERAPIES SERIES
Discharge: HOME | End: 2021-06-22

## 2021-06-22 DIAGNOSIS — M25.561 CHRONIC PAIN OF RIGHT KNEE: ICD-10-CM

## 2021-06-22 DIAGNOSIS — M25.661 KNEE STIFFNESS, RIGHT: ICD-10-CM

## 2021-06-22 DIAGNOSIS — M25.461 PAIN AND SWELLING OF RIGHT KNEE: ICD-10-CM

## 2021-06-22 DIAGNOSIS — R26.9 GAIT ABNORMALITY: ICD-10-CM

## 2021-06-22 DIAGNOSIS — Z96.651 S/P TOTAL KNEE ARTHROPLASTY, RIGHT: ICD-10-CM

## 2021-06-22 DIAGNOSIS — G89.29 CHRONIC PAIN OF RIGHT KNEE: ICD-10-CM

## 2021-06-22 DIAGNOSIS — M25.561 PAIN AND SWELLING OF RIGHT KNEE: ICD-10-CM

## 2021-06-22 DIAGNOSIS — M17.11 PRIMARY OSTEOARTHRITIS OF RIGHT KNEE: Primary | ICD-10-CM

## 2021-06-22 PROCEDURE — 97110 THERAPEUTIC EXERCISES: CPT | Performed by: PHYSICAL THERAPIST

## 2021-06-22 PROCEDURE — 97161 PT EVAL LOW COMPLEX 20 MIN: CPT | Performed by: PHYSICAL THERAPIST

## 2021-06-22 RX ORDER — HYDROCODONE BITARTRATE AND ACETAMINOPHEN 7.5; 325 MG/1; MG/1
TABLET ORAL
Qty: 40 TABLET | Refills: 0 | Status: SHIPPED | OUTPATIENT
Start: 2021-06-22 | End: 2021-12-27

## 2021-06-22 NOTE — THERAPY EVALUATION
Outpatient Physical Therapy Ortho Initial Evaluation  [facility]     Patient Name: Joi Aleman  : 1945  MRN: [id number]  Today's Date: 2021      Visit Date: 2021    Patient Active Problem List   Diagnosis   • Adaptive colitis   • Arthritis   • Avitaminosis D   • Depression   • Dermatitis seborrheica   • Essential hypertension   • HLD (hyperlipidemia)   • Pulmonary nodule   • Right groin pain   • Shortness of breath   • Coronary sinus abnormality   • Pain   • Primary osteoarthritis of right knee        Past Medical History:   Diagnosis Date   • Anxiety and depression    • Arthritis    • Brain aneurysm     WATCHING   • Frequent urination    • Hyperlipidemia    • Hypertension    • Insomnia    • Knee pain, bilateral    • Osteoarthritis    • SOB (shortness of breath) on exertion         Past Surgical History:   Procedure Laterality Date   • BLEPHAROPLASTY     • CARDIAC CATHETERIZATION N/A 2020    Procedure: Coronary angiography;  Surgeon: Víctor Hernandez MD;  Location: [facility] CATH INVASIVE LOCATION;  Service: Cardiovascular;  Laterality: N/A;   • CARDIAC CATHETERIZATION N/A 2020    Procedure: Left heart cath;  Surgeon: Víctor Hernandez MD;  Location: [facility] CATH INVASIVE LOCATION;  Service: Cardiovascular;  Laterality: N/A;   • CARDIAC CATHETERIZATION N/A 2020    Procedure: Left ventriculography;  Surgeon: Víctor Hernandez MD;  Location: [facility] CATH INVASIVE LOCATION;  Service: Cardiovascular;  Laterality: N/A;   • CARDIAC CATHETERIZATION N/A 2020    Procedure: Right heart cath with shunt run;  Surgeon: Víctor Hernandez MD;  Location: [facility] CATH INVASIVE LOCATION;  Service: Cardiovascular;  Laterality: N/A;   • CARPAL TUNNEL RELEASE Right    • COLON SURGERY     • COLONOSCOPY      normal less than 10 years ago   • HYSTERECTOMY     • OOPHORECTOMY     • TOE SURGERY Right     right big toe replecemnet 12 years ago   • TONSILLECTOMY     • TOTAL KNEE ARTHROPLASTY  Right 6/2/2021    Procedure: TOTAL KNEE ARTHROPLASTY, with left knee steroid injection;  Surgeon: Shamar Boone MD;  Location: [facility] OR;  Service: Orthopedics;  Laterality: Right;       Visit Dx:     ICD-10-CM ICD-9-CM   1. Primary osteoarthritis of right knee  M17.11 715.16   2. S/P total knee arthroplasty, right  Z96.651 V43.65   3. Chronic pain of right knee  M25.561 719.46    G89.29 338.29   4. Knee stiffness, right  M25.661 719.56   5. Gait abnormality  R26.9 781.2   6. Pain and swelling of right knee  M25.561 719.46    M25.461 719.06         Patient History     Row Name 06/22/21 0147             History    Chief Complaint  Pain  -SC      Type of Pain  Knee pain  -SC      Date Current Problem(s) Began  06/02/21  -SC      Brief Description of Current Complaint  chronic right knee OA, tried PT, braces, injection, got to point couldn't walk, s/p right TKA 06/02/21, completed HH PT 6/17/21, saw surgeon 06/17/21, staples removed, sent to OP PT. Goal to improve gait and mobility and decrease pain. States having nausea and hot/cold sweats at night. not sure if from pain medication.    -SC      Patient/Caregiver Goals  Relieve pain;Return to prior level of function;Improve mobility;Improve strength;Know what to do to help the symptoms;Decrease swelling  -SC      Current Tobacco Use  former  -SC      Smoking Status  none  -SC      Patient's Rating of General Health  Good  -SC      Hand Dominance  right-handed  -SC      Occupation/sports/leisure activities  movies  -SC      History of Previous Related Injuries  chronic right knee pain  -SC      Are you or can you be pregnant  No  -SC         Pain     Pain Location  Knee  -SC      Pain at Present  4  -SC      Pain at Best  2  -SC      Pain at Worst  7  -SC      Pain Frequency  Constant/continuous  -SC      Pain Description  Aching;Tightness;Other (Comment) swelling  -SC      What Performance Factors Make the Current Problem(s) WORSE?  increased activity, prolonged  positioning, dependent position  -SC      What Performance Factors Make the Current Problem(s) BETTER?  elevation, ice, medication, rest, short walks  -SC      Pain Comments  right knee  -SC      Is your sleep disturbed?  Yes  -SC      Is medication used to assist with sleep?  Yes  -SC      Difficulties with ADL's?  yes  -SC      Difficulties with recreational activities?  yes  -SC         Fall Risk Assessment    Any falls in the past year:  No  -SC      Previous Functional Level  -- independent  -SC         Services    Are you currently receiving Home Health services  No  -SC         Daily Activities    Primary Language  English  -SC      Are you able to read  Yes  -SC      Are you able to write  Yes  -SC      How does patient learn best?  Listening;Reading;Demonstration  -SC      Teaching needs identified  Home Exercise Program;Management of Condition  -SC      Patient is concerned about/has problems with  Bed Mobility;Climbing Stairs;Coordination;Difficulty with self care (i.e. bathing, dressing, toileting:;Flexibility;Grasping objects lifting;Performing home management (household chores, shopping, care of dependents);Performing job responsibilities/community activities (work, school,;Performing sports, recreation, and play activities;Sitting;Standing;Transfers (getting out of a chair, bed);Walking  -SC      Does patient have problems with the following?  None  -SC      Barriers to learning  None  -SC      Functional Status  mobility issues preventing performance of daily activities  -SC      Explanation of Functional Status Problem  modifications but independent  -SC      Pt Participated in POC and Goals  Yes  -SC         Safety    Are you being hurt, hit, or frightened by anyone at home or in your life?  No  -SC      Are you being neglected by a caregiver  No  -SC        User Key  (r) = Recorded By, (t) = Taken By, (c) = Cosigned By    Initials Name Provider Type    Xiomy Gonzalez, PT Physical Therapist           PT Ortho     Row Name 06/22/21 0147       Subjective Comments    Subjective Comments  initial evaluation  -SC       Precautions and Contraindications    Precautions/Limitations  fall precautions  -SC       Subjective Pain    Able to rate subjective pain?  yes  -SC    Pre-Treatment Pain Level  4  -SC       Special Tests/Palpation    Special Tests/Palpation  Knee  -SC       Knee Special Tests    Karthik’s sign (DVT)  Right:;Negative  -SC       General ROM    GENERAL ROM COMMENTS  AAROM knee in HL R 0-113, L 0-138  -SC       MMT (Manual Muscle Testing)    Rt Lower Ext  Rt Knee Extension;Rt Knee Flexion  -SC    Lt Lower Ext  Lt Knee Extension;Lt Knee Flexion  -SC       MMT Right Lower Ext    Rt Knee Extension MMT, Gross Movement  (4/5) good  -SC    Rt Knee Flexion MMT, Gross Movement  (3+/5) fair plus  -SC       MMT Left Lower Ext    Lt Knee Extension MMT, Gross Movement  (4+/5) good plus  -SC    Lt Knee Flexion MMT, Gross Movement  (4+/5) good plus  -SC       Gait/Stairs (Locomotion)    [place] Level (Gait)  modified independence  -SC    Assistive Device (Gait)  cane, straight  -SC    Pattern (Gait)  step-through;3-point  -SC    Deviations/Abnormal Patterns (Gait)  antalgic;base of support, wide;alisson decreased;gait speed decreased;stride length decreased;weight shifting decreased;right sided deviations  -SC    Bilateral Gait Deviations  forward flexed posture;hip hiking;Trendelenburg sign;weight shift ability decreased  -SC      User Key  (r) = Recorded By, (t) = Taken By, (c) = Cosigned By    Initials Name Provider Type    SC Xiomy Peterson, PT Physical Therapist                      Therapy Education  Education Details: prognosis, post op care, elevation, ice, progression of exercise and gait training  Given: HEP, Symptoms/condition management, Pain management, Posture/body mechanics, Mobility training, Edema management, Other (comment)  Program: New  How Provided: Verbal, Demonstration  Provided  to: Patient  Level of Understanding: Teach back education performed, Verbalized, Demonstrated     PT OP Goals     Row Name 06/22/21 0147          PT Short Term Goals    STG Date to Achieve  07/20/21  -SC     STG 1  Pt will be independent and compliant with initial home exercise program focused on knee stabilization to improve gait patterns and decreased pain levels for improved function in home and community.  -SC     STG 1 Progress  New  -SC     STG 2  Pt to have right knee P/AAROM extension to flexion 0 to 120 degrees for improved mobility and gait patterns.  -[facility] 2 Progress  New  -SC     STG 3  Patient able to reciprocally ascend 4 steps with 1 HR and STC with proper mechanics.  -[facility] 3 Progress  New  -SC     STG 4  Pt will be independent with self management of symptoms including use of ice and scar massage (when appropriate).  -[facility] 4 Progress  [facility]        Long Term Goals    LTG Date to Achieve  09/21/21  -SC     LTG 1  Pt to be independent and compliant with advanced home exercise program for self management of condition.  -SC     LTG 1 Progress  New  -SC     LTG 2  Pt to ambulate with normal heel/toe gait pattern without assistive device to facilitate ease/safety of return to community mobility  -SC     LTG 2 Progress  New  -SC     LTG 3  Pt will increase active right knee flexion to 120 degrees for ease in getting in and out of car.    -SC     LTG 3 Progress  New  -SC     LTG 4  Patient improved KOS >/=60/80 for improved functional abilities with independence in home and community with return to prior level of functioning.  -SC     LTG 4 Progress  [facility]        Time Calculation    PT Goal Re-Cert Due Date  09/21/21  -SC       User Key  (r) = Recorded By, (t) = Taken By, (c) = Cosigned By    Initials Name Provider Type    Xiomy Gonzalez, PT Physical Therapist          PT Assessment/Plan     Row Name 06/22/21 0147          PT Assessment    Functional Limitations  Impaired  gait;Limitation in home management;Limitations in community activities;Performance in leisure activities;Performance in self-care ADL;Performance in sport activities  -SC     Impairments  Balance;Edema;Endurance;Gait;Impaired flexibility;Integumentary integrity;Joint mobility;Muscle strength;Pain;Range of motion  -SC     Assessment Comments  Mrs. Aleman is a pleasant 75 year old female, chronic right knee pain with OA, multiple trials of PT, bracing and injections, underwear R TKA with surgeon Dr. Boone 06/02/21, return to home after acute care [facility] on 06/03/21 with family for support, does live alone, received  PT until 06/17/21, returned to surgeon 06/17/21 for stable removal, is currently ambulating with [facility] modified independence. Presents to therapy in stable condition with post operative decreased range of motion, swelling, limited strength and function, increased pain, and altered gait. She does have comorbities of age, HTN, lives alone and unable to currently drive due to surgery that could limit progress. She will greatly benefit from skilled outpatient orthopedic physical therapy to address issues and progress through TKA protocol.  -SC     Please refer to paper survey for additional self-reported information  Yes  -SC     Rehab Potential  Good  -SC     Patient/caregiver participated in establishment of treatment plan and goals  Yes  -SC     Patient would benefit from skilled therapy intervention  Yes  -SC        PT Plan    Predicted Duration of Therapy Intervention (PT)  16 sessions  -SC     Planned CPT's?  PT EVAL LOW COMPLEXITY: 43369;PT RE-EVAL: 09364;PT THER PROC EA 15 MIN: 18811;PT THER ACT EA 15 MIN: 82823;PT MANUAL THERAPY EA 15 MIN: 85512;PT NEUROMUSC RE-EDUCATION EA 15 MIN: 45451;PT GAIT TRAINING EA 15 MIN: 76832;PT EVAL AQUA: 16207;PT AQUATIC THERAPY EA 15 MIN: 30753;PT SELF CARE/HOME MGMT/TRAIN EA 15: 31931;PT HOT OR COLD PACK TREAT MCARE;PT ELECTRICAL STIM UNATTEND: ;PT ELECTRICAL STIM  ATTD EA 15 MIN: 18479;PT ULTRASOUND EA 15 MIN: 81177  -SC     PT Plan Comments  assess therapeutic exercise program, progress as indicated with weights, core stabilization, and gait training/balance/stairs as indicated, ice for pain management  -SC       User Key  (r) = Recorded By, (t) = Taken By, (c) = Cosigned By    Initials Name Provider Type    SC Xiomy Peterson, PT Physical Therapist          Modalities     Row Name 06/22/21 0147             Ice    Patient reports will apply ice at home to involved area  Yes  -SC        User Key  (r) = Recorded By, (t) = Taken By, (c) = Cosigned By    Initials Name Provider Type    SC Xiomy Peterson, PT Physical Therapist        OP Exercises     Row Name 06/22/21 0147             Subjective Comments    Subjective Comments  initial evaluation  -SC         Subjective Pain    Able to rate subjective pain?  yes  -SC      Pre-Treatment Pain Level  4  -SC         Total Minutes    82140 - PT Therapeutic Exercise Minutes  28  -SC         Exercise 1    Exercise Name 1  nustep  -SC      Time 1  5 minutes  -SC      Additional Comments  UE/LEs L4  -SC         Exercise 2    Exercise Name 2  standing HR  -SC      Sets 2  1  -SC      Reps 2  15  -SC         Exercise 3    Exercise Name 3  standing hip abduction  -SC      Sets 3  1  -SC      Reps 3  15  -SC      Additional Comments  R, L  -SC         Exercise 4    Exercise Name 4  standing HS curl  -SC      Sets 4  1  -SC      Reps 4  10  -SC      Additional Comments  R  -SC         Exercise 5    Exercise Name 5  seated LAQ  -SC      Sets 5  2  -SC      Reps 5  10  -SC      Additional Comments  R, L  -SC         Exercise 6    Exercise Name 6  SAQ  -SC      Sets 6  2  -SC      Reps 6  10  -SC      Additional Comments  R, L  -SC         Exercise 7    Exercise Name 7  quad set   -SC      Sets 7  1  -SC      Reps 7  15  -SC      Time 7  5 seconds  -SC      Additional Comments  R  -SC         Exercise 8    Exercise Name 8  heel slides HL   -SC      Sets 8  1  -SC      Reps 8  5  -SC      Time 8  10 seconds  -SC        User Key  (r) = Recorded By, (t) = Taken By, (c) = Cosigned By    Initials Name Provider Type    Xiomy Gonzalez, PT Physical Therapist                        Outcome Measure Options: Knee Outcome Score- ADL  Knee Outcome Score  Knee Outcome Score Comments: 60%      Time Calculation:     Start Time: 1447  Stop Time: 1526  Time Calculation (min): 39 min  Timed Charges  18813 - PT Therapeutic Exercise Minutes: 28  Untimed Charges  PT Eval/Re-eval Minutes: 11  Total Minutes  Timed Charges Total Minutes: 28  Untimed Charges Total Minutes: 11   Total Minutes: 39     Therapy Charges for Today     Code Description Service Date Service Provider Modifiers Qty    18112058209 HC PT THER PROC EA 15 MIN 6/22/2021 Xiomy Peterson, PT GP 2    02857906231 HC PT EVAL LOW COMPLEXITY 1 6/22/2021 Xiomy Peterson, PT GP 1          PT G-Codes  Outcome Measure Options: Knee Outcome Score- ADL         Xiomy Amin PT  6/22/2021

## 2021-06-24 ENCOUNTER — HOSPITAL ENCOUNTER (OUTPATIENT)
Dept: PHYSICAL THERAPY | Facility: HOSPITAL | Age: 76
Setting detail: THERAPIES SERIES
Discharge: HOME | End: 2021-06-24

## 2021-06-24 DIAGNOSIS — M25.561 CHRONIC PAIN OF RIGHT KNEE: ICD-10-CM

## 2021-06-24 DIAGNOSIS — M17.11 PRIMARY OSTEOARTHRITIS OF RIGHT KNEE: Primary | ICD-10-CM

## 2021-06-24 DIAGNOSIS — M25.461 PAIN AND SWELLING OF RIGHT KNEE: ICD-10-CM

## 2021-06-24 DIAGNOSIS — M25.661 KNEE STIFFNESS, RIGHT: ICD-10-CM

## 2021-06-24 DIAGNOSIS — M25.561 PAIN AND SWELLING OF RIGHT KNEE: ICD-10-CM

## 2021-06-24 DIAGNOSIS — Z96.651 S/P TOTAL KNEE ARTHROPLASTY, RIGHT: ICD-10-CM

## 2021-06-24 DIAGNOSIS — R26.9 GAIT ABNORMALITY: ICD-10-CM

## 2021-06-24 DIAGNOSIS — G89.29 CHRONIC PAIN OF RIGHT KNEE: ICD-10-CM

## 2021-06-24 PROCEDURE — 97110 THERAPEUTIC EXERCISES: CPT

## 2021-06-24 NOTE — THERAPY TREATMENT NOTE
Outpatient Physical Therapy Ortho Initial Evaluation  [facility]     Patient Name: Joi Aleman  : 1945  MRN: [id number]  Today's Date: 2021      Visit Date: 2021    Patient Active Problem List   Diagnosis   • Adaptive colitis   • Arthritis   • Avitaminosis D   • Depression   • Dermatitis seborrheica   • Essential hypertension   • HLD (hyperlipidemia)   • Pulmonary nodule   • Right groin pain   • Shortness of breath   • Coronary sinus abnormality   • Pain   • Primary osteoarthritis of right knee        Past Medical History:   Diagnosis Date   • Anxiety and depression    • Arthritis    • Brain aneurysm     WATCHING   • Frequent urination    • Hyperlipidemia    • Hypertension    • Insomnia    • Knee pain, bilateral    • Osteoarthritis    • SOB (shortness of breath) on exertion         Past Surgical History:   Procedure Laterality Date   • BLEPHAROPLASTY     • CARDIAC CATHETERIZATION N/A 2020    Procedure: Coronary angiography;  Surgeon: Víctor Hernandez MD;  Location: [facility] CATH INVASIVE LOCATION;  Service: Cardiovascular;  Laterality: N/A;   • CARDIAC CATHETERIZATION N/A 2020    Procedure: Left heart cath;  Surgeon: Víctor Hernandez MD;  Location: [facility] CATH INVASIVE LOCATION;  Service: Cardiovascular;  Laterality: N/A;   • CARDIAC CATHETERIZATION N/A 2020    Procedure: Left ventriculography;  Surgeon: Víctor Hernandez MD;  Location: [facility] CATH INVASIVE LOCATION;  Service: Cardiovascular;  Laterality: N/A;   • CARDIAC CATHETERIZATION N/A 2020    Procedure: Right heart cath with shunt run;  Surgeon: Víctor Hernandez MD;  Location: [facility] CATH INVASIVE LOCATION;  Service: Cardiovascular;  Laterality: N/A;   • CARPAL TUNNEL RELEASE Right    • COLON SURGERY     • COLONOSCOPY      normal less than 10 years ago   • HYSTERECTOMY     • OOPHORECTOMY     • TOE SURGERY Right     right big toe replecemnet 12 years ago   • TONSILLECTOMY     • TOTAL KNEE ARTHROPLASTY  Right 6/2/2021    Procedure: TOTAL KNEE ARTHROPLASTY, with left knee steroid injection;  Surgeon: Shamar Boone MD;  Location: [facility] MAIN OR;  Service: Orthopedics;  Laterality: Right;       Visit Dx:     ICD-10-CM ICD-9-CM   1. Primary osteoarthritis of right knee  M17.11 715.16   2. S/P total knee arthroplasty, right  Z96.651 V43.65   3. Chronic pain of right knee  M25.561 719.46    G89.29 338.29   4. Knee stiffness, right  M25.661 719.56   5. Gait abnormality  R26.9 781.2   6. Pain and swelling of right knee  M25.561 719.46    M25.461 719.06                             Therapy Education  Education Details: Review of precautions  Given: HEP, Symptoms/condition management, Pain management  Program: New, Reinforced  How Provided: Verbal, Demonstration, Written  Provided to: Patient  Level of Understanding: Teach back education performed         PT Assessment/Plan     Row Name 06/24/21 0911          PT Assessment    Assessment Comments  Ms. Aleman returns for her first follow up for TKR-R.  Today she is having increased pain(8/10) most likely due to doing too much work in her yard yesterday.  Pt was able to complete all exercises but had  more difficulty with heel slides and knee flex.  gait with cane looks very good.  -LP     Please refer to paper survey for additional self-reported information  Yes  -LP     Rehab Potential  Good  -LP     Patient/caregiver participated in establishment of treatment plan and goals  Yes  -LP     Patient would benefit from skilled therapy intervention  Yes  -LP        PT Plan    PT Frequency  2x/week  -LP     Predicted Duration of Therapy Intervention (PT)  4 weeks  -LP     PT Plan Comments  Continue with current POC  -LP       User Key  (r) = Recorded By, (t) = Taken By, (c) = Cosigned By    Initials Name Provider Type    LP Gianna Aguilar, PT Physical Therapist          Modalities     Row Name 06/24/21 0800             Ice    Ice Applied  Yes  -LP      Location  R knee  -LP       PT Ice Rx Minutes  12  -LP      Ice S/P Rx  Yes  -LP        User Key  (r) = Recorded By, (t) = Taken By, (c) = Cosigned By    Initials Name Provider Type    Gianna Cruz PT Physical Therapist        OP Exercises     Row Name 06/24/21 0800             Subjective Comments    Subjective Comments  I worked out in the yard a lot yesterday, I think I did too much because I started hurting more and couldn't sleep last nigth. Still hurting this AM  -LP         Subjective Pain    Able to rate subjective pain?  yes  -LP      Pre-Treatment Pain Level  8  -LP      Post-Treatment Pain Level  7  -LP         Total Minutes    46903 - PT Therapeutic Exercise Minutes  40  -LP         Exercise 1    Exercise Name 1  nustep  -LP      Time 1  3.5 min  -LP      Additional Comments  L3  -LP         Exercise 2    Exercise Name 2  standing HR  -LP      Cueing 2  Verbal;Tactile;Demo  -LP      Sets 2  1  -LP      Reps 2  10  -LP      Additional Comments  back on heels  -LP         Exercise 3    Exercise Name 3  standing hip abduction  -LP      Cueing 3  Verbal;Tactile;Demo  -LP      Sets 3  1  -LP      Reps 3  12  -LP      Additional Comments  B  -LP         Exercise 4    Exercise Name 4  standing HS curl  -LP      Cueing 4  Verbal;Tactile;Demo  -LP      Sets 4  1  -LP      Reps 4  12  -LP      Additional Comments  R only  -LP         Exercise 5    Exercise Name 5  seated LAQ  -LP      Cueing 5  Verbal;Tactile;Demo  -LP      Sets 5  2  -LP      Reps 5  10  -LP      Additional Comments  R only  -LP         Exercise 6    Exercise Name 6  SAQ  -LP      Cueing 6  Verbal;Tactile;Demo  -LP      Sets 6  2  -LP      Reps 6  10  -LP      Additional Comments  R only  -LP         Exercise 7    Exercise Name 7  quad set   -LP      Sets 7  1  -LP      Reps 7  15  -LP      Time 7  3-5 sec  -LP      Additional Comments  R  -LP         Exercise 8    Exercise Name 8  heel slides HL  -LP      Sets 8  1  -LP      Reps 8  10  -LP      Time 8  3-5s  -LP       Additional Comments  cuing to hold longer  -LP         Exercise 9    Exercise Name 9  stair stretch  -LP      Cueing 9  Verbal;Tactile;Demo  -LP      Sets 9  1  -LP      Reps 9  5  -LP      Time 9  10s  -LP         Exercise 10    Exercise Name 10  high march  -LP      Cueing 10  Verbal;Tactile;Demo  -LP      Sets 10  1  -LP      Reps 10  10  -LP      Additional Comments  in II bars  -LP         Exercise 11    Exercise Name 11  side stepping  -LP      Cueing 11  Verbal;Tactile;Demo  -LP      Reps 11  3 laps  -LP      Additional Comments  IIbars  -LP         Exercise 12    Exercise Name 12  sit -stand  -LP      Cueing 12  Verbal;Tactile;Demo  -LP      Reps 12  1  -LP      Time 12  5  -LP      Additional Comments  no arms from high low table  -LP         Exercise 13    Exercise Name 13  gait with cane  -LP      Cueing 13  Verbal;Tactile;Demo  -LP      Reps 13  200'  -LP      Additional Comments  cuing for toe off with knee flex to clear foot from floor/avoiding hip hike good heel strike  -LP        User Key  (r) = Recorded By, (t) = Taken By, (c) = Cosigned By    Initials Name Provider Type    LP Gianna Aguilar, PT Physical Therapist                                  Time Calculation:     Start Time: 0830  Stop Time: 0915  Time Calculation (min): 45 min  Timed Charges  49811 - PT Therapeutic Exercise Minutes: 40  Untimed Charges  PT Ice Rx Minutes: 12  Total Minutes  Timed Charges Total Minutes: 40  Untimed Charges Total Minutes: 12   Total Minutes: 52     Therapy Charges for Today     Code Description Service Date Service Provider Modifiers Qty    94445852815 HC PT THER PROC EA 15 MIN 6/24/2021 Gianna Aguilar PT GP 3                    Gianna Aguilar PT  6/24/2021

## 2021-06-29 ENCOUNTER — HOSPITAL ENCOUNTER (OUTPATIENT)
Dept: PHYSICAL THERAPY | Facility: HOSPITAL | Age: 76
Setting detail: THERAPIES SERIES
Discharge: HOME | End: 2021-06-29

## 2021-06-29 DIAGNOSIS — M17.11 PRIMARY OSTEOARTHRITIS OF RIGHT KNEE: Primary | ICD-10-CM

## 2021-06-29 DIAGNOSIS — G89.29 CHRONIC PAIN OF RIGHT KNEE: ICD-10-CM

## 2021-06-29 DIAGNOSIS — R26.9 GAIT ABNORMALITY: ICD-10-CM

## 2021-06-29 DIAGNOSIS — M25.661 KNEE STIFFNESS, RIGHT: ICD-10-CM

## 2021-06-29 DIAGNOSIS — Z96.651 S/P TOTAL KNEE ARTHROPLASTY, RIGHT: ICD-10-CM

## 2021-06-29 DIAGNOSIS — M25.461 PAIN AND SWELLING OF RIGHT KNEE: ICD-10-CM

## 2021-06-29 DIAGNOSIS — M25.561 PAIN AND SWELLING OF RIGHT KNEE: ICD-10-CM

## 2021-06-29 DIAGNOSIS — M25.561 CHRONIC PAIN OF RIGHT KNEE: ICD-10-CM

## 2021-06-29 PROCEDURE — 97110 THERAPEUTIC EXERCISES: CPT

## 2021-06-29 NOTE — THERAPY TREATMENT NOTE
Outpatient Physical Therapy Ortho Treatment Note  [facility]     Patient Name: Joi Aleman  : 1945  MRN: [id number]  Today's Date: 2021      Visit Date: 2021    Visit Dx:    ICD-10-CM ICD-9-CM   1. Primary osteoarthritis of right knee  M17.11 715.16   2. S/P total knee arthroplasty, right  Z96.651 V43.65   3. Chronic pain of right knee  M25.561 719.46    G89.29 338.29   4. Knee stiffness, right  M25.661 719.56   5. Gait abnormality  R26.9 781.2   6. Pain and swelling of right knee  M25.561 719.46    M25.461 719.06       Patient Active Problem List   Diagnosis   • Adaptive colitis   • Arthritis   • Avitaminosis D   • Depression   • Dermatitis seborrheica   • Essential hypertension   • HLD (hyperlipidemia)   • Pulmonary nodule   • Right groin pain   • Shortness of breath   • Coronary sinus abnormality   • Pain   • Primary osteoarthritis of right knee        Past Medical History:   Diagnosis Date   • Anxiety and depression    • Arthritis    • Brain aneurysm     WATCHING   • Frequent urination    • Hyperlipidemia    • Hypertension    • Insomnia    • Knee pain, bilateral    • Osteoarthritis    • SOB (shortness of breath) on exertion         Past Surgical History:   Procedure Laterality Date   • BLEPHAROPLASTY     • CARDIAC CATHETERIZATION N/A 2020    Procedure: Coronary angiography;  Surgeon: Víctor Hernandez MD;  Location: [facility] CATH INVASIVE LOCATION;  Service: Cardiovascular;  Laterality: N/A;   • CARDIAC CATHETERIZATION N/A 2020    Procedure: Left heart cath;  Surgeon: Víctor Hernandez MD;  Location: [facility] CATH INVASIVE LOCATION;  Service: Cardiovascular;  Laterality: N/A;   • CARDIAC CATHETERIZATION N/A 2020    Procedure: Left ventriculography;  Surgeon: Víctor Hernandez MD;  Location: [facility] CATH INVASIVE LOCATION;  Service: Cardiovascular;  Laterality: N/A;   • CARDIAC CATHETERIZATION N/A 2020    Procedure: Right heart cath with shunt run;  Surgeon:  Víctor Hernandez MD;  Location: [facility] INVASIVE LOCATION;  Service: Cardiovascular;  Laterality: N/A;   • CARPAL TUNNEL RELEASE Right    • COLON SURGERY     • COLONOSCOPY      normal less than 10 years ago   • HYSTERECTOMY     • OOPHORECTOMY     • TOE SURGERY Right     right big toe replecemnet 12 years ago   • TONSILLECTOMY     • TOTAL KNEE ARTHROPLASTY Right 6/2/2021    Procedure: TOTAL KNEE ARTHROPLASTY, with left knee steroid injection;  Surgeon: Shamar Boone MD;  Location: [facility] OR;  Service: Orthopedics;  Laterality: Right;       PT Ortho     Row Name 06/29/21 1600       General ROM    GENERAL ROM COMMENTS  AAROM R knee: 0-112  -RS      User Key  (r) = Recorded By, (t) = Taken By, (c) = Cosigned By    Initials Name Provider Type    RS Minoo London, PT Physical Therapist                      PT Assessment/Plan     Row Name 06/29/21 1600          PT Assessment    Assessment Comments  Ms. Aleman reports good tolerance for HEP and previous session, she has some questions regarding expecttations post op and symptoms, discussed with pt and answered questions. Added SLR, resisted side steps, fwd/retro walking, mini squats, and HS/gastroc stretch all with good tolerance. Reviewed current HEP however did not progress. Discussed continued pain and edema managment techniques, pt reports understanding and remains a good candidate for skilled PT.  -RS        PT Plan    PT Plan Comments  FOcus on standing activities, resistance to LAQ  -RS       User Key  (r) = Recorded By, (t) = Taken By, (c) = Cosigned By    Initials Name Provider Type    RS Minoo London, PT Physical Therapist            OP Exercises     Row Name 06/29/21 1600             Subjective Comments    Subjective Comments  Feeling better overall, took half a pain pill before coming, has a couple questions  -RS         Subjective Pain    Able to rate subjective pain?  yes  -RS      Pre-Treatment Pain Level  6  -RS         Total Minutes     44795 - PT Therapeutic Exercise Minutes  39  -RS         Exercise 1    Exercise Name 1  nustep  -RS      Time 1  5 min  -RS      Additional Comments  L4  -RS         Exercise 2    Exercise Name 2  standing HR/TR  -RS      Cueing 2  Verbal;Tactile;Demo  -RS      Sets 2  1  -RS      Reps 2  20  -RS         Exercise 3    Exercise Name 3  seated HS stretch  -RS      Cueing 3  Verbal;Demo  -RS      Sets 3  --  -RS      Reps 3  320s  -RS         Exercise 4    Exercise Name 4  gastroc stretch steps  -RS      Cueing 4  Verbal;Demo  -RS      Sets 4  --  -RS      Reps 4  3  -RS      Time 4  20s  -RS         Exercise 5    Exercise Name 5  seated LAQ  -RS      Cueing 5  Verbal;Tactile;Demo  -RS      Sets 5  2  -RS      Reps 5  10  -RS      Additional Comments  R only  -RS         Exercise 6    Exercise Name 6  SAQ  -RS      Cueing 6  Verbal;Tactile;Demo  -RS      Sets 6  2  -RS      Reps 6  10  -RS      Additional Comments  R only  -RS         Exercise 7    Exercise Name 7  quad set   -RS      Sets 7  1  -RS      Reps 7  15  -RS      Time 7  3-5 sec  -RS      Additional Comments  R  -RS         Exercise 8    Exercise Name 8  heel slides in sitting  -RS      Sets 8  1  -RS      Reps 8  10  -RS      Time 8  3-5s  -RS         Exercise 9    Exercise Name 9  stair stretch  -RS      Cueing 9  Verbal;Tactile;Demo  -RS      Sets 9  1  -RS      Reps 9  5  -RS      Time 9  20 s  -RS      Additional Comments  knee flex  -RS         Exercise 10    Exercise Name 10  SLR  -RS      Cueing 10  Verbal;Demo  -RS      Sets 10  2  -RS      Reps 10  10  -RS      Additional Comments  cue for QS  -RS         Exercise 11    Exercise Name 11  side stepping  -RS      Cueing 11  Verbal;Tactile;Demo  -RS      Reps 11  3 laps  -RS      Additional Comments  YTB below knees (below inision)  -RS         Exercise 12    Exercise Name 12  sit -stand  -RS      Cueing 12  Verbal;Tactile;Demo  -RS      Reps 12  1  -RS      Time 12  10  -RS      Additional Comments   no UE, cues for equal WB  -RS         Exercise 13    Exercise Name 13  gait with cane  -RS      Cueing 13  Verbal;Tactile;Demo  -RS      Reps 13  1 long lap  -RS      Additional Comments  cues for heel strike, raised cane one level as pt was leaning  -RS         Exercise 14    Exercise Name 14  mini squat  -RS      Cueing 14  Verbal;Demo  -RS      Reps 14  10  -RS      Additional Comments  cues for hips back  -RS        User Key  (r) = Recorded By, (t) = Taken By, (c) = Cosigned By    Initials Name Provider Type    RS Minoo London, PT Physical Therapist                       PT OP Goals     Row Name 06/29/21 1600          PT Short Term Goals    STG Date to Achieve  07/20/21  -RS     STG 1  Pt will be independent and compliant with initial home exercise program focused on knee stabilization to improve gait patterns and decreased pain levels for improved function in home and community.  -RS     STG 1 Progress  Ongoing  -RS     STG 2  Pt to have right knee P/AAROM extension to flexion 0 to 120 degrees for improved mobility and gait patterns.  -RS     STG 2 Progress  Ongoing  -RS     STG 3  Patient able to reciprocally ascend 4 steps with 1 HR and STC with proper mechanics.  -RS     STG 3 Progress  Ongoing  -RS     STG 4  Pt will be independent with self management of symptoms including use of ice and scar massage (when appropriate).  -RS     STG 4 Progress  Ongoing  -RS        Long Term Goals    LTG Date to Achieve  09/21/21  -RS     LTG 1  Pt to be independent and compliant with advanced home exercise program for self management of condition.  -RS     LTG 1 Progress  Ongoing  -RS     LTG 2  Pt to ambulate with normal heel/toe gait pattern without assistive device to facilitate ease/safety of return to community mobility  -RS     LTG 2 Progress  Ongoing  -RS     LTG 3  Pt will increase active right knee flexion to 120 degrees for ease in getting in and out of car.    -RS     LTG 3 Progress  Ongoing  -RS     LTG 4   Patient improved KOS >/=60/80 for improved functional abilities with independence in home and community with return to prior level of functioning.  -RS     LTG 4 Progress  Ongoing  -RS       User Key  (r) = Recorded By, (t) = Taken By, (c) = Cosigned By    Initials Name Provider Type    RS Minoo London PT Physical Therapist                         Time Calculation:   Start Time: 1600  Stop Time: 1642  Time Calculation (min): 42 min  Timed Charges  10052 - PT Therapeutic Exercise Minutes: 39  Total Minutes  Timed Charges Total Minutes: 39   Total Minutes: 39  Therapy Charges for Today     Code Description Service Date Service Provider Modifiers Qty    67552639105 HC PT THER PROC EA 15 MIN 6/29/2021 Minoo London, PT GP 3                    Minoo London PT  6/29/2021

## 2021-07-01 ENCOUNTER — HOSPITAL ENCOUNTER (OUTPATIENT)
Dept: PHYSICAL THERAPY | Facility: HOSPITAL | Age: 76
Setting detail: THERAPIES SERIES
Discharge: HOME | End: 2021-07-01

## 2021-07-01 DIAGNOSIS — Z96.651 S/P TOTAL KNEE ARTHROPLASTY, RIGHT: ICD-10-CM

## 2021-07-01 DIAGNOSIS — M25.561 CHRONIC PAIN OF RIGHT KNEE: ICD-10-CM

## 2021-07-01 DIAGNOSIS — M25.661 KNEE STIFFNESS, RIGHT: ICD-10-CM

## 2021-07-01 DIAGNOSIS — R26.9 GAIT ABNORMALITY: ICD-10-CM

## 2021-07-01 DIAGNOSIS — M25.561 PAIN AND SWELLING OF RIGHT KNEE: ICD-10-CM

## 2021-07-01 DIAGNOSIS — M17.11 PRIMARY OSTEOARTHRITIS OF RIGHT KNEE: Primary | ICD-10-CM

## 2021-07-01 DIAGNOSIS — G89.29 CHRONIC PAIN OF RIGHT KNEE: ICD-10-CM

## 2021-07-01 DIAGNOSIS — M25.461 PAIN AND SWELLING OF RIGHT KNEE: ICD-10-CM

## 2021-07-01 PROCEDURE — 97110 THERAPEUTIC EXERCISES: CPT

## 2021-07-01 NOTE — THERAPY TREATMENT NOTE
Outpatient Physical Therapy Ortho Treatment Note  [facility]     Patient Name: Joi Aleman  : 1945  MRN: [id number]  Today's Date: 2021      Visit Date: 2021    Visit Dx:    ICD-10-CM ICD-9-CM   1. Primary osteoarthritis of right knee  M17.11 715.16   2. S/P total knee arthroplasty, right  Z96.651 V43.65   3. Chronic pain of right knee  M25.561 719.46    G89.29 338.29   4. Knee stiffness, right  M25.661 719.56   5. Gait abnormality  R26.9 781.2   6. Pain and swelling of right knee  M25.561 719.46    M25.461 719.06       Patient Active Problem List   Diagnosis   • Adaptive colitis   • Arthritis   • Avitaminosis D   • Depression   • Dermatitis seborrheica   • Essential hypertension   • HLD (hyperlipidemia)   • Pulmonary nodule   • Right groin pain   • Shortness of breath   • Coronary sinus abnormality   • Pain   • Primary osteoarthritis of right knee        Past Medical History:   Diagnosis Date   • Anxiety and depression    • Arthritis    • Brain aneurysm     WATCHING   • Frequent urination    • Hyperlipidemia    • Hypertension    • Insomnia    • Knee pain, bilateral    • Osteoarthritis    • SOB (shortness of breath) on exertion         Past Surgical History:   Procedure Laterality Date   • BLEPHAROPLASTY     • CARDIAC CATHETERIZATION N/A 2020    Procedure: Coronary angiography;  Surgeon: Víctor Hernandez MD;  Location: [facility] CATH INVASIVE LOCATION;  Service: Cardiovascular;  Laterality: N/A;   • CARDIAC CATHETERIZATION N/A 2020    Procedure: Left heart cath;  Surgeon: Víctor Hernandez MD;  Location: [facility] CATH INVASIVE LOCATION;  Service: Cardiovascular;  Laterality: N/A;   • CARDIAC CATHETERIZATION N/A 2020    Procedure: Left ventriculography;  Surgeon: Víctor Hernandez MD;  Location: [facility] CATH INVASIVE LOCATION;  Service: Cardiovascular;  Laterality: N/A;   • CARDIAC CATHETERIZATION N/A 2020    Procedure: Right heart cath with shunt run;  Surgeon:  Víctor Hernandez MD;  Location: [facility] INVASIVE LOCATION;  Service: Cardiovascular;  Laterality: N/A;   • CARPAL TUNNEL RELEASE Right    • COLON SURGERY     • COLONOSCOPY      normal less than 10 years ago   • HYSTERECTOMY     • OOPHORECTOMY     • TOE SURGERY Right     right big toe replecemnet 12 years ago   • TONSILLECTOMY     • TOTAL KNEE ARTHROPLASTY Right 6/2/2021    Procedure: TOTAL KNEE ARTHROPLASTY, with left knee steroid injection;  Surgeon: Shamar Boone MD;  Location: [facility] OR;  Service: Orthopedics;  Laterality: Right;                       PT Assessment/Plan     Row Name 07/01/21 1200          PT Assessment    Assessment Comments  Ms. Aleman reports increased swelling after standing for 20 min watering her plants and asks questions rarding when she will be able to return to eating in restaurants/ visiting her daughter, therefore discussed safety and tissue healing timelines, pt receptive. Added rocking on rcumbent bike as well as marching focused on SLS with good tolerance. She reports fatigue at end of session however no signigficant increase in pain. Advised pt to increase movement throughout the day in short bouts around the house and monitor swelling/pain following. She remains appropriate for and motivated to participate with PT.  -RS        PT Plan    PT Plan Comments  Consider small step up, tandem walking, assess tolerance  for sitting up 20 min at home  -RS       User Key  (r) = Recorded By, (t) = Taken By, (c) = Cosigned By    Initials Name Provider Type    RS Minoo London, PT Physical Therapist            OP Exercises     Row Name 07/01/21 1200             Subjective Comments    Subjective Comments  Went to water flowers for 20 min and swelled a lot, was hurting and had to ice/elevate the rest of the day. Better today than yesterday.  -RS         Subjective Pain    Able to rate subjective pain?  yes  -RS      Pre-Treatment Pain Level  5  -RS         Total Minutes    62333 -  PT Therapeutic Exercise Minutes  40  -RS         Exercise 1    Exercise Name 1  nustep  -RS      Time 1  4 min  -RS      Additional Comments  L4  -RS         Exercise 2    Exercise Name 2  marching fwd- focused on SLS  -RS      Cueing 2  Verbal  -RS      Sets 2  --  -RS      Reps 2  3 laps  -RS      Additional Comments  L UE  -RS         Exercise 3    Exercise Name 3  seated HS stretch  -RS      Cueing 3  Verbal;Demo  -RS      Reps 3  3  -RS      Time 3  20s  -RS         Exercise 4    Exercise Name 4  rec bike- rocking  -RS      Cueing 4  Verbal  -RS      Reps 4  --  -RS      Time 4  4 min  -RS         Exercise 5    Exercise Name 5  seated LAQ  -RS      Cueing 5  Verbal;Tactile;Demo  -RS      Sets 5  2  -RS      Reps 5  10  -RS      Additional Comments  2# R only  -RS         Exercise 6    Exercise Name 6  SAQ  -RS      Cueing 6  Verbal;Tactile;Demo  -RS      Sets 6  2  -RS      Reps 6  10  -RS      Additional Comments  2# R only  -RS         Exercise 7    Exercise Name 7  static balance foam- romberg  -RS      Cueing 7  Verbal;Demo  -RS      Sets 7  --  -RS      Reps 7  3  -RS      Time 7  20s  -RS      Additional Comments  1 set EC, 2 sets EO  -RS         Exercise 8    Exercise Name 8  --  -RS      Sets 8  --  -RS      Reps 8  --  -RS      Time 8  --  -RS         Exercise 9    Exercise Name 9  stair stretch  -RS      Cueing 9  Verbal;Tactile;Demo  -RS      Sets 9  1  -RS      Reps 9  5  -RS      Time 9  20 s  -RS      Additional Comments  knee flex  -RS         Exercise 10    Exercise Name 10  --  -RS      Cueing 10  --  -RS      Sets 10  --  -RS      Reps 10  --  -RS         Exercise 11    Exercise Name 11  side stepping  -RS      Cueing 11  Verbal;Tactile;Demo  -RS      Reps 11  3 laps  -RS      Additional Comments  YTB below knees (below inision)  -RS         Exercise 12    Exercise Name 12  sit -stand  -RS      Cueing 12  Verbal;Tactile;Demo  -RS      Reps 12  2  -RS      Time 12  10  -RS      Additional  Comments  no UE, cues for equal WB  -RS         Exercise 13    Exercise Name 13  --  -RS      Cueing 13  --  -RS      Reps 13  --  -RS         Exercise 14    Exercise Name 14  mini squat  -RS      Cueing 14  Verbal;Demo  -RS      Sets 14  2  -RS      Reps 14  10  -RS      Additional Comments  cues for hips back  -RS        User Key  (r) = Recorded By, (t) = Taken By, (c) = Cosigned By    Initials Name Provider Type    RS Minoo London, PT Physical Therapist                           Therapy Education  Education Details: tissue healing, safety when out in community, expectations post op, frequent short movement breaks  Given: HEP, Symptoms/condition management, Pain management  Program: Reinforced  How Provided: Demonstration, Verbal  Provided to: Patient  Level of Understanding: Verbalized, Demonstrated              Time Calculation:   Start Time: 1200  Stop Time: 1244  Time Calculation (min): 44 min  Timed Charges  46710 - PT Therapeutic Exercise Minutes: 40  Total Minutes  Timed Charges Total Minutes: 40   Total Minutes: 40  Therapy Charges for Today     Code Description Service Date Service Provider Modifiers Qty    71037714242  PT THER PROC EA 15 MIN 7/1/2021 Minoo London, PT GP 3                    Minoo London PT  7/1/2021

## 2021-07-06 ENCOUNTER — HOSPITAL ENCOUNTER (OUTPATIENT)
Dept: PHYSICAL THERAPY | Facility: HOSPITAL | Age: 76
Setting detail: THERAPIES SERIES
Discharge: HOME | End: 2021-07-06

## 2021-07-06 DIAGNOSIS — R26.9 GAIT ABNORMALITY: ICD-10-CM

## 2021-07-06 DIAGNOSIS — M25.561 CHRONIC PAIN OF RIGHT KNEE: ICD-10-CM

## 2021-07-06 DIAGNOSIS — Z96.651 S/P TOTAL KNEE ARTHROPLASTY, RIGHT: ICD-10-CM

## 2021-07-06 DIAGNOSIS — M17.11 PRIMARY OSTEOARTHRITIS OF RIGHT KNEE: Primary | ICD-10-CM

## 2021-07-06 DIAGNOSIS — M25.661 KNEE STIFFNESS, RIGHT: ICD-10-CM

## 2021-07-06 DIAGNOSIS — G89.29 CHRONIC PAIN OF RIGHT KNEE: ICD-10-CM

## 2021-07-06 DIAGNOSIS — M25.461 PAIN AND SWELLING OF RIGHT KNEE: ICD-10-CM

## 2021-07-06 DIAGNOSIS — M25.561 PAIN AND SWELLING OF RIGHT KNEE: ICD-10-CM

## 2021-07-06 PROCEDURE — 97110 THERAPEUTIC EXERCISES: CPT

## 2021-07-06 NOTE — THERAPY TREATMENT NOTE
Outpatient Physical Therapy Ortho Treatment Note  [facility]     Patient Name: Joi Aleman  : 1945  MRN: [id number]  Today's Date: 2021      Visit Date: 2021    Visit Dx:    ICD-10-CM ICD-9-CM   1. Primary osteoarthritis of right knee  M17.11 715.16   2. S/P total knee arthroplasty, right  Z96.651 V43.65   3. Chronic pain of right knee  M25.561 719.46    G89.29 338.29   4. Knee stiffness, right  M25.661 719.56   5. Gait abnormality  R26.9 781.2   6. Pain and swelling of right knee  M25.561 719.46    M25.461 719.06       Patient Active Problem List   Diagnosis   • Adaptive colitis   • Arthritis   • Avitaminosis D   • Depression   • Dermatitis seborrheica   • Essential hypertension   • HLD (hyperlipidemia)   • Pulmonary nodule   • Right groin pain   • Shortness of breath   • Coronary sinus abnormality   • Pain   • Primary osteoarthritis of right knee        Past Medical History:   Diagnosis Date   • Anxiety and depression    • Arthritis    • Brain aneurysm     WATCHING   • Frequent urination    • Hyperlipidemia    • Hypertension    • Insomnia    • Knee pain, bilateral    • Osteoarthritis    • SOB (shortness of breath) on exertion         Past Surgical History:   Procedure Laterality Date   • BLEPHAROPLASTY     • CARDIAC CATHETERIZATION N/A 2020    Procedure: Coronary angiography;  Surgeon: Víctor Hernandez MD;  Location: [facility] CATH INVASIVE LOCATION;  Service: Cardiovascular;  Laterality: N/A;   • CARDIAC CATHETERIZATION N/A 2020    Procedure: Left heart cath;  Surgeon: Víctor Hernandez MD;  Location: [facility] CATH INVASIVE LOCATION;  Service: Cardiovascular;  Laterality: N/A;   • CARDIAC CATHETERIZATION N/A 2020    Procedure: Left ventriculography;  Surgeon: Víctor Hernandez MD;  Location: [facility] CATH INVASIVE LOCATION;  Service: Cardiovascular;  Laterality: N/A;   • CARDIAC CATHETERIZATION N/A 2020    Procedure: Right heart cath with shunt run;  Surgeon:  Víctor Hernandez MD;  Location: [facility] INVASIVE LOCATION;  Service: Cardiovascular;  Laterality: N/A;   • CARPAL TUNNEL RELEASE Right    • COLON SURGERY     • COLONOSCOPY      normal less than 10 years ago   • HYSTERECTOMY     • OOPHORECTOMY     • TOE SURGERY Right     right big toe replecemnet 12 years ago   • TONSILLECTOMY     • TOTAL KNEE ARTHROPLASTY Right 6/2/2021    Procedure: TOTAL KNEE ARTHROPLASTY, with left knee steroid injection;  Surgeon: Shamar Boone MD;  Location: [facility] OR;  Service: Orthopedics;  Laterality: Right;       PT Ortho     Row Name 07/06/21 1600       General ROM    GENERAL ROM COMMENTS  AAROM R knee 0-115  -RS      User Key  (r) = Recorded By, (t) = Taken By, (c) = Cosigned By    Initials Name Provider Type    Minoo Matthews, PT Physical Therapist                      PT Assessment/Plan     Row Name 07/06/21 1700          PT Assessment    Assessment Comments  Ms. Aleman reports some improvement in standing tolerance if she breaks standing into shorter, more frfequent breaks. She asks about driving, she is no longer taking prescription pain medication and demonstrates full control RLE therefore discussed recommendations to practce in parking lot first, she reports understanding. Noted improved AAROM R knee flex to 117 degrees, pt continues to have pain with end ROM however overall pain is improving. Focused on gait training, stan stan, without UE assist and TKE with GTB to improved stability in stance on RLE, pt with good tolerance.  -RS        PT Plan    PT Plan Comments  Consider small step up, tandem walking  -RS       User Key  (r) = Recorded By, (t) = Taken By, (c) = Cosigned By    Initials Name Provider Type    Minoo Matthews, PT Physical Therapist            OP Exercises     Row Name 07/06/21 1600             Subjective Comments    Subjective Comments  Feeling better overall, did 2 10 min watering flowers and it was sore but better  -RS         Subjective Pain     Able to rate subjective pain?  yes  -RS      Pre-Treatment Pain Level  3  -RS         Total Minutes    62768 - PT Therapeutic Exercise Minutes  40  -RS         Exercise 1    Exercise Name 1  nustep  -RS      Time 1  4 min  -RS      Additional Comments  L4  -RS         Exercise 2    Exercise Name 2  marching fwd- focused on SLS  -RS      Cueing 2  Verbal  -RS      Reps 2  3 laps  -RS      Additional Comments  LUE 1 lap, 2 laps no UE  -RS         Exercise 3    Exercise Name 3  seated HS stretch  -RS      Cueing 3  Verbal;Demo  -RS      Reps 3  3  -RS      Time 3  20s  -RS         Exercise 4    Exercise Name 4  rec bike- rocking  -RS      Cueing 4  Verbal  -RS      Time 4  5 min  -RS         Exercise 5    Exercise Name 5  seated LAQ  -RS      Cueing 5  Verbal;Tactile;Demo  -RS      Sets 5  2  -RS      Reps 5  10  -RS      Additional Comments  3# R only  -RS         Exercise 6    Exercise Name 6  SAQ  -RS      Cueing 6  Verbal;Tactile;Demo  -RS      Sets 6  2  -RS      Reps 6  10  -RS      Additional Comments  3# R only  -RS         Exercise 7    Exercise Name 7  TKE with GTB  -RS      Cueing 7  Verbal;Demo  -RS      Reps 7  15  -RS      Time 7  5s  -RS         Exercise 8    Exercise Name 8  stan stan no UE  -RS      Cueing 8  Verbal;Demo  -RS      Time 8  2 min  -RS         Exercise 9    Exercise Name 9  stair stretch  -RS      Cueing 9  Verbal;Tactile;Demo  -RS      Sets 9  1  -RS      Reps 9  5  -RS      Time 9  20 s  -RS      Additional Comments  knee flex  -RS         Exercise 11    Exercise Name 11  side stepping  -RS      Cueing 11  Verbal;Tactile;Demo  -RS      Reps 11  3 laps  -RS      Additional Comments  YTB below knees (below inision)  -RS         Exercise 12    Exercise Name 12  sit -stand  -RS      Cueing 12  Verbal;Tactile;Demo  -RS      Reps 12  2  -RS      Time 12  10  -RS      Additional Comments  UE on knees  -RS         Exercise 14    Exercise Name 14  --  -RS      Cueing 14  --  -RS      Sets 14   --  -RS      Reps 14  --  -RS        User Key  (r) = Recorded By, (t) = Taken By, (c) = Cosigned By    Initials Name Provider Type    Minoo Matthews, PT Physical Therapist                           Therapy Education  Education Details: HEP progression  Given: HEP, Symptoms/condition management, Pain management  Program: Reinforced, Progressed  How Provided: Verbal, Demonstration, Written  Provided to: Patient  Level of Understanding: Verbalized, Demonstrated              Time Calculation:   Start Time: 1600  Stop Time: 1642  Time Calculation (min): 42 min  Timed Charges  89784 - PT Therapeutic Exercise Minutes: 40  Total Minutes  Timed Charges Total Minutes: 40   Total Minutes: 40  Therapy Charges for Today     Code Description Service Date Service Provider Modifiers Qty    65355287007 HC PT THER PROC EA 15 MIN 7/6/2021 Minoo London, PT GP 3                    Minoo London, CLARE  7/6/2021

## 2021-07-08 ENCOUNTER — HOSPITAL ENCOUNTER (OUTPATIENT)
Dept: PHYSICAL THERAPY | Facility: HOSPITAL | Age: 76
Setting detail: THERAPIES SERIES
Discharge: HOME | End: 2021-07-08

## 2021-07-08 DIAGNOSIS — M25.461 PAIN AND SWELLING OF RIGHT KNEE: ICD-10-CM

## 2021-07-08 DIAGNOSIS — R26.9 GAIT ABNORMALITY: ICD-10-CM

## 2021-07-08 DIAGNOSIS — G89.29 CHRONIC PAIN OF RIGHT KNEE: ICD-10-CM

## 2021-07-08 DIAGNOSIS — M25.661 KNEE STIFFNESS, RIGHT: ICD-10-CM

## 2021-07-08 DIAGNOSIS — M17.11 PRIMARY OSTEOARTHRITIS OF RIGHT KNEE: Primary | ICD-10-CM

## 2021-07-08 DIAGNOSIS — M25.561 PAIN AND SWELLING OF RIGHT KNEE: ICD-10-CM

## 2021-07-08 DIAGNOSIS — M25.561 CHRONIC PAIN OF RIGHT KNEE: ICD-10-CM

## 2021-07-08 DIAGNOSIS — Z96.651 S/P TOTAL KNEE ARTHROPLASTY, RIGHT: ICD-10-CM

## 2021-07-08 PROCEDURE — 97110 THERAPEUTIC EXERCISES: CPT

## 2021-07-08 PROCEDURE — 97530 THERAPEUTIC ACTIVITIES: CPT

## 2021-07-08 NOTE — THERAPY TREATMENT NOTE
Outpatient Physical Therapy Ortho Treatment Note  [facility]     Patient Name: Joi Aleman  : 1945  MRN: [id number]  Today's Date: 2021      Visit Date: 2021    Visit Dx:    ICD-10-CM ICD-9-CM   1. Primary osteoarthritis of right knee  M17.11 715.16   2. S/P total knee arthroplasty, right  Z96.651 V43.65   3. Chronic pain of right knee  M25.561 719.46    G89.29 338.29   4. Knee stiffness, right  M25.661 719.56   5. Gait abnormality  R26.9 781.2   6. Pain and swelling of right knee  M25.561 719.46    M25.461 719.06       Patient Active Problem List   Diagnosis   • Adaptive colitis   • Arthritis   • Avitaminosis D   • Depression   • Dermatitis seborrheica   • Essential hypertension   • HLD (hyperlipidemia)   • Pulmonary nodule   • Right groin pain   • Shortness of breath   • Coronary sinus abnormality   • Pain   • Primary osteoarthritis of right knee        Past Medical History:   Diagnosis Date   • Anxiety and depression    • Arthritis    • Brain aneurysm     WATCHING   • Frequent urination    • Hyperlipidemia    • Hypertension    • Insomnia    • Knee pain, bilateral    • Osteoarthritis    • SOB (shortness of breath) on exertion         Past Surgical History:   Procedure Laterality Date   • BLEPHAROPLASTY     • CARDIAC CATHETERIZATION N/A 2020    Procedure: Coronary angiography;  Surgeon: Víctor Hernandez MD;  Location: [facility] CATH INVASIVE LOCATION;  Service: Cardiovascular;  Laterality: N/A;   • CARDIAC CATHETERIZATION N/A 2020    Procedure: Left heart cath;  Surgeon: Víctor Hernandez MD;  Location: [facility] CATH INVASIVE LOCATION;  Service: Cardiovascular;  Laterality: N/A;   • CARDIAC CATHETERIZATION N/A 2020    Procedure: Left ventriculography;  Surgeon: Víctor Hernandez MD;  Location: [facility] CATH INVASIVE LOCATION;  Service: Cardiovascular;  Laterality: N/A;   • CARDIAC CATHETERIZATION N/A 2020    Procedure: Right heart cath with shunt run;  Surgeon:  Víctor Hernandez MD;  Location: [facility] INVASIVE LOCATION;  Service: Cardiovascular;  Laterality: N/A;   • CARPAL TUNNEL RELEASE Right    • COLON SURGERY     • COLONOSCOPY      normal less than 10 years ago   • HYSTERECTOMY     • OOPHORECTOMY     • TOE SURGERY Right     right big toe replecemnet 12 years ago   • TONSILLECTOMY     • TOTAL KNEE ARTHROPLASTY Right 6/2/2021    Procedure: TOTAL KNEE ARTHROPLASTY, with left knee steroid injection;  Surgeon: Shamar Boone MD;  Location: [facility] OR;  Service: Orthopedics;  Laterality: Right;                       PT Assessment/Plan     Row Name 07/08/21 1400          PT Assessment    Assessment Comments  Ms. Aleman reports inc swelling and pain this date after being more active yesterday and today. COntinued with current program and held progression to repeated step ups due to observed mild to moderate edema (no inc redness, heat) and focused on static balance and gait mechanics with good tolerance. Pt with mild unsteadiness noted when focusing on heel strike, however able to safely recover without assistance. She continues to demonstrate limitations inmobility, pain, and strength and remains a good candidate for skilled PT.  -RS        PT Plan    PT Plan Comments  Consider small steps, follow up regarding walking at home 5 min x3 times per day  -RS       User Key  (r) = Recorded By, (t) = Taken By, (c) = Cosigned By    Initials Name Provider Type    RS Minoo London, PT Physical Therapist            OP Exercises     Row Name 07/08/21 1300             Subjective Comments    Subjective Comments  Feeling sore from being up more than usual  -RS         Subjective Pain    Able to rate subjective pain?  yes  -RS      Pre-Treatment Pain Level  6  -RS         Total Minutes    18570 - PT Therapeutic Exercise Minutes  30  -RS      33743 - PT Therapeutic Activity Minutes  9  -RS         Exercise 1    Exercise Name 1  nustep  -RS      Time 1  4 min  -RS      Additional  Comments  L4  -RS         Exercise 2    Exercise Name 2  marching fwd- focused on SLS  -RS      Cueing 2  Verbal  -RS      Reps 2  3 laps  -RS      Additional Comments  for improved gait mechanics  -RS         Exercise 3    Exercise Name 3  seated HS stretch  -RS      Cueing 3  Verbal;Demo  -RS      Reps 3  3  -RS      Time 3  20s  -RS         Exercise 4    Exercise Name 4  rec bike- rocking  -RS      Cueing 4  Verbal  -RS      Time 4  5 min  -RS         Exercise 5    Exercise Name 5  seated LAQ  -RS      Cueing 5  Verbal;Tactile;Demo  -RS      Sets 5  2  -RS      Reps 5  10  -RS      Additional Comments  3# R only  -RS         Exercise 6    Exercise Name 6  SAQ  -RS      Cueing 6  Verbal;Tactile;Demo  -RS      Sets 6  2  -RS      Reps 6  10  -RS      Additional Comments  3# R only  -RS         Exercise 7    Exercise Name 7  TKE with GTB  -RS      Cueing 7  Verbal;Demo  -RS      Reps 7  15  -RS      Time 7  5s  -RS         Exercise 8    Exercise Name 8  gait in clinic with SPC- focus on heel strike, foot placement  -RS      Cueing 8  Verbal;Demo  -RS      Reps 8  1 long lap  -RS      Time 8  2-3 small LOB noted, pt recovers independently with SPC  -RS         Exercise 9    Exercise Name 9  stair stretch  -RS      Cueing 9  Verbal;Tactile;Demo  -RS      Sets 9  1  -RS      Reps 9  5  -RS      Time 9  20 s  -RS      Additional Comments  knee flex  -RS         Exercise 10    Exercise Name 10  ststic foam stance  -RS      Cueing 10  Verbal;Demo  -RS      Reps 10  3  -RS      Time 10  20s  -RS      Additional Comments  EC feet together  -RS         Exercise 11    Exercise Name 11  tandem balance floor  -RS      Cueing 11  Verbal;Demo  -RS      Sets 11  2  -RS      Reps 11  20ea  -RS         Exercise 12    Exercise Name 12  sit -stand  -RS      Cueing 12  Verbal;Tactile;Demo  -RS      Reps 12  2  -RS      Time 12  10  -RS      Additional Comments  UE on knees  -RS        User Key  (r) = Recorded By, (t) = Taken By, (c) =  Cosigned By    Initials Name Provider Type    Minoo Matthews PT Physical Therapist                       PT OP Goals     Row Name 07/08/21 1400          PT Short Term Goals    STG Date to Achieve  07/20/21  -RS     STG 1  Pt will be independent and compliant with initial home exercise program focused on knee stabilization to improve gait patterns and decreased pain levels for improved function in home and community.  -RS     STG 1 Progress  Met  -RS     STG 2  Pt to have right knee P/AAROM extension to flexion 0 to 120 degrees for improved mobility and gait patterns.  -RS     STG 2 Progress  Partially Met  -RS     STG 2 Progress Comments  0-117  -RS     STG 3  Patient able to reciprocally ascend 4 steps with 1 HR and STC with proper mechanics.  -RS     STG 3 Progress  Progressing  -RS     STG 4  Pt will be independent with self management of symptoms including use of ice and scar massage (when appropriate).  -RS     STG 4 Progress  Progressing  -RS        Long Term Goals    LTG Date to Achieve  09/21/21  -RS     LTG 1  Pt to be independent and compliant with advanced home exercise program for self management of condition.  -RS     LTG 1 Progress  Ongoing  -RS     LTG 2  Pt to ambulate with normal heel/toe gait pattern without assistive device to facilitate ease/safety of return to community mobility  -RS     LTG 2 Progress  Ongoing  -RS     LTG 3  Pt will increase active right knee flexion to 120 degrees for ease in getting in and out of car.    -RS     LTG 3 Progress  Ongoing  -RS     LTG 4  Patient improved KOS >/=60/80 for improved functional abilities with independence in home and community with return to prior level of functioning.  -RS     LTG 4 Progress  Ongoing  -RS       User Key  (r) = Recorded By, (t) = Taken By, (c) = Cosigned By    Initials Name Provider Type    Minoo Matthews PT Physical Therapist          Therapy Education  Education Details: discussed skin care around incision, safety  with stairs to do her laundry, gait pattern  Given: HEP, Symptoms/condition management, Pain management  Program: Reinforced  How Provided: Verbal, Demonstration  Provided to: Patient              Time Calculation:   Start Time: 1330  Stop Time: 1413  Time Calculation (min): 43 min  Timed Charges  31544 - PT Therapeutic Exercise Minutes: 30  04236 - PT Therapeutic Activity Minutes: 9  Total Minutes  Timed Charges Total Minutes: 39   Total Minutes: 39  Therapy Charges for Today     Code Description Service Date Service Provider Modifiers Qty    16657986112  PT THER PROC EA 15 MIN 7/8/2021 Minoo Londno, PT GP 2    [id number]  PT THERAPEUTIC ACT EA 15 MIN 7/8/2021 Minoo London, PT GP 1                    Minoo London PT  7/8/2021

## 2021-07-13 ENCOUNTER — HOSPITAL ENCOUNTER (OUTPATIENT)
Dept: PHYSICAL THERAPY | Facility: HOSPITAL | Age: 76
Setting detail: THERAPIES SERIES
Discharge: HOME | End: 2021-07-13

## 2021-07-13 DIAGNOSIS — M17.11 PRIMARY OSTEOARTHRITIS OF RIGHT KNEE: Primary | ICD-10-CM

## 2021-07-13 DIAGNOSIS — Z96.651 S/P TOTAL KNEE ARTHROPLASTY, RIGHT: ICD-10-CM

## 2021-07-13 DIAGNOSIS — M25.561 CHRONIC PAIN OF RIGHT KNEE: ICD-10-CM

## 2021-07-13 DIAGNOSIS — G89.29 CHRONIC PAIN OF RIGHT KNEE: ICD-10-CM

## 2021-07-13 DIAGNOSIS — M25.661 KNEE STIFFNESS, RIGHT: ICD-10-CM

## 2021-07-13 DIAGNOSIS — R26.9 GAIT ABNORMALITY: ICD-10-CM

## 2021-07-13 DIAGNOSIS — M25.561 PAIN AND SWELLING OF RIGHT KNEE: ICD-10-CM

## 2021-07-13 DIAGNOSIS — M25.461 PAIN AND SWELLING OF RIGHT KNEE: ICD-10-CM

## 2021-07-13 PROCEDURE — 97110 THERAPEUTIC EXERCISES: CPT

## 2021-07-13 PROCEDURE — 97530 THERAPEUTIC ACTIVITIES: CPT

## 2021-07-13 NOTE — THERAPY TREATMENT NOTE
Outpatient Physical Therapy Ortho Treatment Note  [facility]     Patient Name: Joi Aleman  : 1945  MRN: [id number]  Today's Date: 2021      Visit Date: 2021    Visit Dx:    ICD-10-CM ICD-9-CM   1. Primary osteoarthritis of right knee  M17.11 715.16   2. S/P total knee arthroplasty, right  Z96.651 V43.65   3. Chronic pain of right knee  M25.561 719.46    G89.29 338.29   4. Knee stiffness, right  M25.661 719.56   5. Gait abnormality  R26.9 781.2   6. Pain and swelling of right knee  M25.561 719.46    M25.461 719.06       Patient Active Problem List   Diagnosis   • Adaptive colitis   • Arthritis   • Avitaminosis D   • Depression   • Dermatitis seborrheica   • Essential hypertension   • HLD (hyperlipidemia)   • Pulmonary nodule   • Right groin pain   • Shortness of breath   • Coronary sinus abnormality   • Pain   • Primary osteoarthritis of right knee        Past Medical History:   Diagnosis Date   • Anxiety and depression    • Arthritis    • Brain aneurysm     WATCHING   • Frequent urination    • Hyperlipidemia    • Hypertension    • Insomnia    • Knee pain, bilateral    • Osteoarthritis    • SOB (shortness of breath) on exertion         Past Surgical History:   Procedure Laterality Date   • BLEPHAROPLASTY     • CARDIAC CATHETERIZATION N/A 2020    Procedure: Coronary angiography;  Surgeon: Víctor Hernandez MD;  Location: [facility] CATH INVASIVE LOCATION;  Service: Cardiovascular;  Laterality: N/A;   • CARDIAC CATHETERIZATION N/A 2020    Procedure: Left heart cath;  Surgeon: Víctor Hernandez MD;  Location: [facility] CATH INVASIVE LOCATION;  Service: Cardiovascular;  Laterality: N/A;   • CARDIAC CATHETERIZATION N/A 2020    Procedure: Left ventriculography;  Surgeon: Víctor Hernandez MD;  Location: [facility] CATH INVASIVE LOCATION;  Service: Cardiovascular;  Laterality: N/A;   • CARDIAC CATHETERIZATION N/A 2020    Procedure: Right heart cath with shunt run;  Surgeon:  Víctor Hernandez MD;  Location: [facility] INVASIVE LOCATION;  Service: Cardiovascular;  Laterality: N/A;   • CARPAL TUNNEL RELEASE Right    • COLON SURGERY     • COLONOSCOPY      normal less than 10 years ago   • HYSTERECTOMY     • OOPHORECTOMY     • TOE SURGERY Right     right big toe replecemnet 12 years ago   • TONSILLECTOMY     • TOTAL KNEE ARTHROPLASTY Right 6/2/2021    Procedure: TOTAL KNEE ARTHROPLASTY, with left knee steroid injection;  Surgeon: Shamar Boone MD;  Location: [facility] OR;  Service: Orthopedics;  Laterality: Right;                       PT Assessment/Plan     Row Name 07/13/21 1600          PT Assessment    Assessment Comments  Ms. Aleman reports overall improving funcitonal activity tolerance and pain. She tolerates progression of therex program to include step ups, leg press, and SLS with fair to good tolerance. Removed steri strips this date as they were peeling off and  had been in place for 4 weeks, skin intact below and incision closed. Discussed   -RS        PT Plan    PT Plan Comments  Update HEP, consider increased standing challenges. step down foam, inc resistance during leg press  -RS       User Key  (r) = Recorded By, (t) = Taken By, (c) = Cosigned By    Initials Name Provider Type    RS Minoo London, PT Physical Therapist            OP Exercises     Row Name 07/13/21 1400             Subjective Comments    Subjective Comments  Pt drove over the weekend,her leg got tired but it went well, swelling is better overall but still present  -RS         Subjective Pain    Able to rate subjective pain?  yes  -RS      Subjective Pain Comment  sore  -RS         Total Minutes    26334 - PT Therapeutic Exercise Minutes  25  -RS      69444 - PT Therapeutic Activity Minutes  15  -RS         Exercise 1    Exercise Name 1  nustep  -RS      Time 1  4 min  -RS         Exercise 2    Exercise Name 2  marching fwd- focused on SLS  -RS      Cueing 2  Verbal  -RS      Reps 2  3 laps  -RS          Exercise 3    Exercise Name 3  step up  -RS      Cueing 3  Verbal;Demo  -RS      Sets 3  2  -RS      Reps 3  10  -RS      Time 3  --  -RS      Additional Comments  6 inch- L UE  -RS         Exercise 4    Exercise Name 4  rec bike- rocking progressing to reciprocal fwd  -RS      Cueing 4  Verbal  -RS      Time 4  5 min  -RS         Exercise 5    Exercise Name 5  seated LAQ  -RS      Cueing 5  Verbal;Tactile;Demo  -RS      Sets 5  2  -RS      Reps 5  10  -RS      Additional Comments  4#  -RS         Exercise 6    Exercise Name 6  SAQ  -RS      Cueing 6  Verbal;Tactile;Demo  -RS      Sets 6  2  -RS      Reps 6  10  -RS      Additional Comments  4#  -RS         Exercise 7    Exercise Name 7  mini squat  -RS      Cueing 7  Verbal;Demo  -RS      Reps 7  2  -RS      Time 7  10  -RS         Exercise 8    Exercise Name 8  gait in clinic with SPC- focus on heel strike, foot placement  -RS      Cueing 8  Verbal;Demo  -RS      Reps 8  1 long lap  -RS      Time 8  no LOB  -RS         Exercise 9    Exercise Name 9  tandem walking fwd and retro  -RS      Cueing 9  Verbal;Demo  -RS      Sets 9  --  -RS      Reps 9  3 laps  -RS      Time 9  --  -RS         Exercise 10    Exercise Name 10  SLS floor  -RS      Cueing 10  Verbal;Demo  -RS      Reps 10  10  -RS      Time 10  5s int UE assist  -RS         Exercise 11    Exercise Name 11  leg press RLE  -RS      Cueing 11  Verbal;Demo  -RS      Sets 11  2  -RS      Reps 11  10  -RS      Additional Comments  40#  -RS         Exercise 12    Exercise Name 12  gait parallel bars without UE  -RS      Cueing 12  Verbal;Demo  -RS      Reps 12  --  -RS      Time 12  3 laps  -RS         Exercise 13    Exercise Name 13  inspection of skin/steristrips- strips were removed as they were mostly peeling off, skin intact underneath, discussed skin care, advised not to put lotion directly over inferior part of incision, pt verbalized understanding  -RS        User Key  (r) = Recorded By, (t) = Taken  By, (c) = Cosigned By    Initials Name Provider Type    RS Minoo London, PT Physical Therapist                           Therapy Education  Education Details: discussed incision care, advised to apply lotion around incision, avoid inferior part, pt verbalized understanding              Time Calculation:   Start Time: 1430  Stop Time: 1514  Time Calculation (min): 44 min  Timed Charges  45708 - PT Therapeutic Exercise Minutes: 25  62552 - PT Therapeutic Activity Minutes: 15  Total Minutes  Timed Charges Total Minutes: 40   Total Minutes: 40  Therapy Charges for Today     Code Description Service Date Service Provider Modifiers Qty    80899563688 HC PT THER PROC EA 15 MIN 7/13/2021 Minoo London, PT GP 2    [id number] HC PT THERAPEUTIC ACT EA 15 MIN 7/13/2021 Minoo London, PT GP 1                    Minoo London PT  7/13/2021

## 2021-07-15 ENCOUNTER — HOSPITAL ENCOUNTER (OUTPATIENT)
Dept: PHYSICAL THERAPY | Facility: HOSPITAL | Age: 76
Setting detail: THERAPIES SERIES
Discharge: HOME | End: 2021-07-15

## 2021-07-15 DIAGNOSIS — M25.461 PAIN AND SWELLING OF RIGHT KNEE: ICD-10-CM

## 2021-07-15 DIAGNOSIS — M25.561 CHRONIC PAIN OF RIGHT KNEE: ICD-10-CM

## 2021-07-15 DIAGNOSIS — R26.9 GAIT ABNORMALITY: ICD-10-CM

## 2021-07-15 DIAGNOSIS — M25.661 KNEE STIFFNESS, RIGHT: ICD-10-CM

## 2021-07-15 DIAGNOSIS — Z96.651 S/P TOTAL KNEE ARTHROPLASTY, RIGHT: ICD-10-CM

## 2021-07-15 DIAGNOSIS — G89.29 CHRONIC PAIN OF RIGHT KNEE: ICD-10-CM

## 2021-07-15 DIAGNOSIS — M17.11 PRIMARY OSTEOARTHRITIS OF RIGHT KNEE: Primary | ICD-10-CM

## 2021-07-15 DIAGNOSIS — M25.561 PAIN AND SWELLING OF RIGHT KNEE: ICD-10-CM

## 2021-07-15 PROCEDURE — 97530 THERAPEUTIC ACTIVITIES: CPT

## 2021-07-15 PROCEDURE — 97110 THERAPEUTIC EXERCISES: CPT

## 2021-07-15 NOTE — THERAPY TREATMENT NOTE
Outpatient Physical Therapy Ortho Treatment Note  [facility]     Patient Name: Joi Aleman  : 1945  MRN: [id number]  Today's Date: 7/15/2021      Visit Date: 07/15/2021    Visit Dx:    ICD-10-CM ICD-9-CM   1. Primary osteoarthritis of right knee  M17.11 715.16   2. S/P total knee arthroplasty, right  Z96.651 V43.65   3. Chronic pain of right knee  M25.561 719.46    G89.29 338.29   4. Knee stiffness, right  M25.661 719.56   5. Gait abnormality  R26.9 781.2   6. Pain and swelling of right knee  M25.561 719.46    M25.461 719.06       Patient Active Problem List   Diagnosis   • Adaptive colitis   • Arthritis   • Avitaminosis D   • Depression   • Dermatitis seborrheica   • Essential hypertension   • HLD (hyperlipidemia)   • Pulmonary nodule   • Right groin pain   • Shortness of breath   • Coronary sinus abnormality   • Pain   • Primary osteoarthritis of right knee        Past Medical History:   Diagnosis Date   • Anxiety and depression    • Arthritis    • Brain aneurysm     WATCHING   • Frequent urination    • Hyperlipidemia    • Hypertension    • Insomnia    • Knee pain, bilateral    • Osteoarthritis    • SOB (shortness of breath) on exertion         Past Surgical History:   Procedure Laterality Date   • BLEPHAROPLASTY     • CARDIAC CATHETERIZATION N/A 2020    Procedure: Coronary angiography;  Surgeon: Víctor Hernandez MD;  Location: [facility] CATH INVASIVE LOCATION;  Service: Cardiovascular;  Laterality: N/A;   • CARDIAC CATHETERIZATION N/A 2020    Procedure: Left heart cath;  Surgeon: Víctor Hernandez MD;  Location: [facility] CATH INVASIVE LOCATION;  Service: Cardiovascular;  Laterality: N/A;   • CARDIAC CATHETERIZATION N/A 2020    Procedure: Left ventriculography;  Surgeon: Víctor Hernandez MD;  Location: [facility] CATH INVASIVE LOCATION;  Service: Cardiovascular;  Laterality: N/A;   • CARDIAC CATHETERIZATION N/A 2020    Procedure: Right heart cath with shunt run;  Surgeon:  Víctor Hernandez MD;  Location: [facility] INVASIVE LOCATION;  Service: Cardiovascular;  Laterality: N/A;   • CARPAL TUNNEL RELEASE Right    • COLON SURGERY     • COLONOSCOPY      normal less than 10 years ago   • HYSTERECTOMY     • OOPHORECTOMY     • TOE SURGERY Right     right big toe replecemnet 12 years ago   • TONSILLECTOMY     • TOTAL KNEE ARTHROPLASTY Right 6/2/2021    Procedure: TOTAL KNEE ARTHROPLASTY, with left knee steroid injection;  Surgeon: Shamar Boone MD;  Location: [facility] OR;  Service: Orthopedics;  Laterality: Right;                       PT Assessment/Plan     Row Name 07/15/21 1453          PT Assessment    Assessment Comments  Ms. Aleman returns for her 8th visit following TKR-R on 6/2.  She continues to progress toward all goals.  R knee A-AROM =0-119.  She continues to use a SPC, but demonstates good gait pattern;good wieght shift and good heel strike.  Pain still present at 4/10 today.  -LP     Please refer to paper survey for additional self-reported information  Yes  -LP     Rehab Potential  Good  -LP     Patient/caregiver participated in establishment of treatment plan and goals  Yes  -LP     Patient would benefit from skilled therapy intervention  Yes  -LP        PT Plan    PT Frequency  2x/week  -LP     Predicted Duration of Therapy Intervention (PT)  3-4 weeks  -LP     PT Plan Comments  Continue per current POC  -LP       User Key  (r) = Recorded By, (t) = Taken By, (c) = Cosigned By    Initials Name Provider Type    LP Gianna Aguilar, PT Physical Therapist            OP Exercises     Row Name 07/15/21 1300             Subjective Comments    Subjective Comments  Iwalked over from the [employer] parking stand.  -LP         Subjective Pain    Able to rate subjective pain?  yes  -LP      Pre-Treatment Pain Level  4  -LP         Total Minutes    11717 - PT Therapeutic Exercise Minutes  30  -LP      93482 - PT Therapeutic Activity Minutes  9  -LP         Exercise 1    Exercise Name 1   "nustep  -LP      Time 1  4 min  -LP      Additional Comments  L4  -LP         Exercise 2    Exercise Name 2  High march  -LP      Cueing 2  Verbal  -LP      Reps 2  3 laps  -LP         Exercise 3    Exercise Name 3  step up  -LP      Cueing 3  Verbal;Demo  -LP      Sets 3  1  -LP      Reps 3  10ea  -LP      Additional Comments  6\"x 10 then 4\"x 10  -LP         Exercise 5    Exercise Name 5  seated LAQ  -LP      Cueing 5  Verbal;Tactile;Demo  -LP      Sets 5  2  -LP      Reps 5  10  -LP      Additional Comments  4#  -LP         Exercise 6    Exercise Name 6  SAQ  -LP      Cueing 6  Verbal;Tactile;Demo  -LP      Sets 6  2  -LP      Reps 6  10  -LP      Additional Comments  4#  -LP         Exercise 7    Exercise Name 7  mini squat  -LP      Cueing 7  Verbal;Demo  -LP      Reps 7  2  -LP      Time 7  10  -LP      Additional Comments  cuing for proper form  -LP         Exercise 8    Exercise Name 8  gait in clinic with SPC- focus on heel strike, foot placement  -LP      Cueing 8  Verbal;Demo  -LP      Reps 8  around [place] of clinic  -LP         Exercise 9    Exercise Name 9  tandem walking fwd and retro  -LP      Cueing 9  Verbal;Demo  -LP      Reps 9  3 laps  -LP         Exercise 10    Exercise Name 10  SLS floor  -LP      Cueing 10  Verbal;Demo  -LP      Reps 10  5  -LP      Time 10  5s  -LP      Additional Comments  trying to take hand off bar  -LP         Exercise 11    Exercise Name 11  leg press RLE  -LP      Cueing 11  Verbal;Demo  -LP      Sets 11  2  -LP      Reps 11  10  -LP      Additional Comments  60#-B, 40# single  -LP         Exercise 12    Exercise Name 12  gait parallel bars without UE  -LP      Cueing 12  Verbal;Demo  -LP      Time 12  3 laps  -LP      Additional Comments  no hands; working on hip rotation with stan-stan step  -LP         Exercise 13    Exercise Name 13  Tandem stance  -LP      Cueing 13  Verbal;Tactile;Demo  -LP      Reps 13  3 ea  -LP      Additional Comments  changing feet- reaching " overhead  -LP         Exercise 14    Exercise Name 14  standing knee flex  -LP      Cueing 14  Verbal;Demo  -LP      Sets 14  1  -LP      Reps 14  10  -LP      Additional Comments  2#  -LP        User Key  (r) = Recorded By, (t) = Taken By, (c) = Cosigned By    Initials Name Provider Type    LP Gianna Aguilar, PT Physical Therapist                       PT OP Goals     Row Name 07/15/21 1400          PT Short Term Goals    STG 1  Pt will be independent and compliant with initial home exercise program focused on knee stabilization to improve gait patterns and decreased pain levels for improved function in home and community.  -LP     STG 1 Progress  Met  -LP     STG 2  Pt to have right knee P/AAROM extension to flexion 0 to 120 degrees for improved mobility and gait patterns.  -LP     STG 2 Progress  Partially Met  -LP     STG 2 Progress Comments  0-119  -LP     STG 3  Patient able to reciprocally ascend 4 steps with 1 HR and STC with proper mechanics.  -LP     STG 3 Progress  Progressing  -LP     STG 4  Pt will be independent with self management of symptoms including use of ice and scar massage (when appropriate).  -LP     STG 4 Progress  Progressing  -LP        Long Term Goals    LTG 1  Pt to be independent and compliant with advanced home exercise program for self management of condition.  -LP     LTG 1 Progress  Progressing  -LP     LTG 1 Progress Comments  tolerated new exercise well  -LP     LTG 2  Pt to ambulate with normal heel/toe gait pattern without assistive device to facilitate ease/safety of return to community mobility  -LP     LTG 2 Progress  Progressing  -LP     LTG 2 Progress Comments  Using good heel toe gait, still better with SPC  -LP     LTG 3  Pt will increase active right knee flexion to 120 degrees for ease in getting in and out of car.    -LP     LTG 3 Progress  Progressing  -LP     LTG 4  Patient improved KOS >/=60/80 for improved functional abilities with independence in home and  community with return to prior level of functioning.  -LP     LTG 4 Progress  Ongoing  -LP       User Key  (r) = Recorded By, (t) = Taken By, (c) = Cosigned By    Initials Name Provider Type    LP Gianna Aguilar PT Physical Therapist          Therapy Education  Education Details: safety as she starts to do more activities  Given: HEP, Symptoms/condition management, Posture/body mechanics  Program: New, Reinforced, Progressed  How Provided: Verbal, Demonstration  Provided to: Patient  Level of Understanding: Teach back education performed              Time Calculation:   Start Time: 1345  Stop Time: 1425  Time Calculation (min): 40 min  Timed Charges  67686 - PT Therapeutic Exercise Minutes: 30  62962 - PT Therapeutic Activity Minutes: 9  Total Minutes  Timed Charges Total Minutes: 39   Total Minutes: 39  Therapy Charges for Today     Code Description Service Date Service Provider Modifiers Qty    81104366087  PT THER PROC EA 15 MIN 7/15/2021 Gianna Aguilar, PT GP 2    [id number]  PT THERAPEUTIC ACT EA 15 MIN 7/15/2021 Gianna Aguilar, PT GP 1                    Gianna Aguilar PT  7/15/2021

## 2021-07-20 ENCOUNTER — HOSPITAL ENCOUNTER (OUTPATIENT)
Dept: PHYSICAL THERAPY | Facility: HOSPITAL | Age: 76
Setting detail: THERAPIES SERIES
Discharge: HOME | End: 2021-07-20

## 2021-07-20 DIAGNOSIS — M25.561 PAIN AND SWELLING OF RIGHT KNEE: ICD-10-CM

## 2021-07-20 DIAGNOSIS — M25.461 PAIN AND SWELLING OF RIGHT KNEE: ICD-10-CM

## 2021-07-20 DIAGNOSIS — M25.561 CHRONIC PAIN OF RIGHT KNEE: ICD-10-CM

## 2021-07-20 DIAGNOSIS — Z96.651 S/P TOTAL KNEE ARTHROPLASTY, RIGHT: ICD-10-CM

## 2021-07-20 DIAGNOSIS — R26.9 GAIT ABNORMALITY: ICD-10-CM

## 2021-07-20 DIAGNOSIS — M25.661 KNEE STIFFNESS, RIGHT: ICD-10-CM

## 2021-07-20 DIAGNOSIS — G89.29 CHRONIC PAIN OF RIGHT KNEE: ICD-10-CM

## 2021-07-20 DIAGNOSIS — M17.11 PRIMARY OSTEOARTHRITIS OF RIGHT KNEE: Primary | ICD-10-CM

## 2021-07-20 PROCEDURE — 97110 THERAPEUTIC EXERCISES: CPT

## 2021-07-20 PROCEDURE — 97530 THERAPEUTIC ACTIVITIES: CPT

## 2021-07-20 NOTE — THERAPY TREATMENT NOTE
Outpatient Physical Therapy Ortho Treatment Note  [facility]     Patient Name: Joi Aleman  : 1945  MRN: [id number]  Today's Date: 2021      Visit Date: 2021    Visit Dx:    ICD-10-CM ICD-9-CM   1. Primary osteoarthritis of right knee  M17.11 715.16   2. S/P total knee arthroplasty, right  Z96.651 V43.65   3. Chronic pain of right knee  M25.561 719.46    G89.29 338.29   4. Knee stiffness, right  M25.661 719.56   5. Gait abnormality  R26.9 781.2   6. Pain and swelling of right knee  M25.561 719.46    M25.461 719.06       Patient Active Problem List   Diagnosis   • Adaptive colitis   • Arthritis   • Avitaminosis D   • Depression   • Dermatitis seborrheica   • Essential hypertension   • HLD (hyperlipidemia)   • Pulmonary nodule   • Right groin pain   • Shortness of breath   • Coronary sinus abnormality   • Pain   • Primary osteoarthritis of right knee        Past Medical History:   Diagnosis Date   • Anxiety and depression    • Arthritis    • Brain aneurysm     WATCHING   • Frequent urination    • Hyperlipidemia    • Hypertension    • Insomnia    • Knee pain, bilateral    • Osteoarthritis    • SOB (shortness of breath) on exertion         Past Surgical History:   Procedure Laterality Date   • BLEPHAROPLASTY     • CARDIAC CATHETERIZATION N/A 2020    Procedure: Coronary angiography;  Surgeon: Víctor Hernandez MD;  Location: [facility] CATH INVASIVE LOCATION;  Service: Cardiovascular;  Laterality: N/A;   • CARDIAC CATHETERIZATION N/A 2020    Procedure: Left heart cath;  Surgeon: Víctor Hernandez MD;  Location: [facility] CATH INVASIVE LOCATION;  Service: Cardiovascular;  Laterality: N/A;   • CARDIAC CATHETERIZATION N/A 2020    Procedure: Left ventriculography;  Surgeon: Víctor Hernandez MD;  Location: [facility] CATH INVASIVE LOCATION;  Service: Cardiovascular;  Laterality: N/A;   • CARDIAC CATHETERIZATION N/A 2020    Procedure: Right heart cath with shunt run;  Surgeon:  Víctor Hernandez MD;  Location: [facility] INVASIVE LOCATION;  Service: Cardiovascular;  Laterality: N/A;   • CARPAL TUNNEL RELEASE Right    • COLON SURGERY     • COLONOSCOPY      normal less than 10 years ago   • HYSTERECTOMY     • OOPHORECTOMY     • TOE SURGERY Right     right big toe replecemnet 12 years ago   • TONSILLECTOMY     • TOTAL KNEE ARTHROPLASTY Right 6/2/2021    Procedure: TOTAL KNEE ARTHROPLASTY, with left knee steroid injection;  Surgeon: Shamar Boone MD;  Location: [facility] OR;  Service: Orthopedics;  Laterality: Right;                       PT Assessment/Plan     Row Name 07/20/21 1424          PT Assessment    Assessment Comments  Ms. Aleman continues to progress toward all goals followning TKR-R.  She is ambulating with good heel strike and good weight shift.  She is showing increased strength as seen with sit to stand easier.  -LP     Please refer to paper survey for additional self-reported information  Yes  -LP     Rehab Potential  Good  -LP     Patient/caregiver participated in establishment of treatment plan and goals  Yes  -LP     Patient would benefit from skilled therapy intervention  Yes  -LP        PT Plan    PT Frequency  2x/week  -LP     PT Plan Comments  Cont  -LP       User Key  (r) = Recorded By, (t) = Taken By, (c) = Cosigned By    Initials Name Provider Type    LP Gianna Aguilar, PT Physical Therapist            OP Exercises     Row Name 07/20/21 1200             Subjective Comments    Subjective Comments  Still have pain behind my knee- mostly when I do standing knee flexion  -LP         Subjective Pain    Able to rate subjective pain?  yes  -LP      Pre-Treatment Pain Level  3  -LP         Total Minutes    70394 - PT Therapeutic Exercise Minutes  30  -LP      03632 - PT Therapeutic Activity Minutes  10  -LP         Exercise 1    Exercise Name 1  nustep  -LP      Time 1  5 min  -LP      Additional Comments  L5  -LP         Exercise 2    Exercise Name 2  High march  -LP  "     Cueing 2  Verbal  -LP      Reps 2  15  -LP      Additional Comments  on foam  -LP         Exercise 3    Exercise Name 3  step up  -LP      Cueing 3  Verbal;Demo  -LP      Sets 3  1  -LP      Reps 3  10  -LP      Additional Comments  6\"  -LP         Exercise 5    Exercise Name 5  seated LAQ  -LP      Cueing 5  Verbal;Tactile;Demo  -LP      Sets 5  2  -LP      Reps 5  10  -LP      Additional Comments  4#B  -LP         Exercise 6    Exercise Name 6  SAQ  -LP      Cueing 6  Verbal;Tactile;Demo  -LP      Sets 6  2  -LP      Reps 6  10  -LP      Additional Comments  4#  -LP         Exercise 7    Exercise Name 7  mini squat  -LP      Cueing 7  Verbal;Demo  -LP      Reps 7  2  -LP      Time 7  10  -LP      Additional Comments  cuing  -LP         Exercise 8    Exercise Name 8  gait in clinic with SPC- focus on heel strike, foot placement  -LP      Cueing 8  Verbal;Demo  -LP      Sets 8  350'  -LP      Additional Comments  cuing for more hip rotation  -LP         Exercise 9    Exercise Name 9  tandem walking fwd and retro  -LP      Cueing 9  Verbal;Demo  -LP      Reps 9  3 laps  -LP         Exercise 10    Exercise Name 10  SLS on foam  -LP      Cueing 10  Verbal;Demo  -LP      Reps 10  5  -LP      Time 10  3-5s  -LP         Exercise 11    Exercise Name 11  leg press RLE  -LP      Cueing 11  Verbal;Demo  -LP      Sets 11  2  -LP      Reps 11  10  -LP      Additional Comments  60#-B 40# single  -LP         Exercise 12    Exercise Name 12  stan-stan step  -LP      Cueing 12  Verbal;Demo  -LP      Additional Comments  in bars and at counter  -LP         Exercise 13    Exercise Name 13  Tandem stance  -LP      Cueing 13  Verbal;Tactile;Demo  -LP         Exercise 14    Exercise Name 14  standing knee flex  -LP      Cueing 14  Verbal;Demo  -LP      Sets 14  1  -LP      Reps 14  10  -LP      Additional Comments  no weight  -LP         Exercise 15    Exercise Name 15  up/down steps  -LP      Cueing 15  Verbal;Tactile;Demo  -LP      " Reps 15  3  -LP      Additional Comments  reciprical pattern; but step-to for home for safety.  -LP        User Key  (r) = Recorded By, (t) = Taken By, (c) = Cosigned By    Initials Name Provider Type    LP Gianna Aguilar PT Physical Therapist                           Therapy Education  Given: HEP, Symptoms/condition management  Program: New, Reinforced, Progressed  How Provided: Verbal, Demonstration  Provided to: Patient  Level of Understanding: Teach back education performed              Time Calculation:   Start Time: 1215  Stop Time: 1300  Time Calculation (min): 45 min  Timed Charges  10631 - PT Therapeutic Exercise Minutes: 30  62289 - PT Therapeutic Activity Minutes: 10  Total Minutes  Timed Charges Total Minutes: 40   Total Minutes: 40  Therapy Charges for Today     Code Description Service Date Service Provider Modifiers Qty    [id number]  PT THER PROC EA 15 MIN 7/20/2021 Gianna Aguilar, PT GP 2    [id number]  PT THERAPEUTIC ACT EA 15 MIN 7/20/2021 Gianna Aguilar, PT GP 1                    Gianna Aguilar PT  7/20/2021

## 2021-07-22 ENCOUNTER — HOSPITAL ENCOUNTER (OUTPATIENT)
Dept: PHYSICAL THERAPY | Facility: HOSPITAL | Age: 76
Setting detail: THERAPIES SERIES
Discharge: HOME | End: 2021-07-22

## 2021-07-22 DIAGNOSIS — M25.561 CHRONIC PAIN OF RIGHT KNEE: ICD-10-CM

## 2021-07-22 DIAGNOSIS — M17.11 PRIMARY OSTEOARTHRITIS OF RIGHT KNEE: Primary | ICD-10-CM

## 2021-07-22 DIAGNOSIS — G89.29 CHRONIC PAIN OF RIGHT KNEE: ICD-10-CM

## 2021-07-22 DIAGNOSIS — M25.561 PAIN AND SWELLING OF RIGHT KNEE: ICD-10-CM

## 2021-07-22 DIAGNOSIS — R26.9 GAIT ABNORMALITY: ICD-10-CM

## 2021-07-22 DIAGNOSIS — M25.661 KNEE STIFFNESS, RIGHT: ICD-10-CM

## 2021-07-22 DIAGNOSIS — M25.461 PAIN AND SWELLING OF RIGHT KNEE: ICD-10-CM

## 2021-07-22 DIAGNOSIS — Z96.651 S/P TOTAL KNEE ARTHROPLASTY, RIGHT: ICD-10-CM

## 2021-07-22 PROCEDURE — 97110 THERAPEUTIC EXERCISES: CPT

## 2021-07-22 PROCEDURE — 97530 THERAPEUTIC ACTIVITIES: CPT

## 2021-07-22 NOTE — THERAPY TREATMENT NOTE
Outpatient Physical Therapy Ortho Treatment Note  [facility]     Patient Name: Joi Aleman  : 1945  MRN: [id number]  Today's Date: 2021      Visit Date: 2021    Visit Dx:    ICD-10-CM ICD-9-CM   1. Primary osteoarthritis of right knee  M17.11 715.16   2. S/P total knee arthroplasty, right  Z96.651 V43.65   3. Chronic pain of right knee  M25.561 719.46    G89.29 338.29   4. Knee stiffness, right  M25.661 719.56   5. Gait abnormality  R26.9 781.2   6. Pain and swelling of right knee  M25.561 719.46    M25.461 719.06       Patient Active Problem List   Diagnosis   • Adaptive colitis   • Arthritis   • Avitaminosis D   • Depression   • Dermatitis seborrheica   • Essential hypertension   • HLD (hyperlipidemia)   • Pulmonary nodule   • Right groin pain   • Shortness of breath   • Coronary sinus abnormality   • Pain   • Primary osteoarthritis of right knee        Past Medical History:   Diagnosis Date   • Anxiety and depression    • Arthritis    • Brain aneurysm     WATCHING   • Frequent urination    • Hyperlipidemia    • Hypertension    • Insomnia    • Knee pain, bilateral    • Osteoarthritis    • SOB (shortness of breath) on exertion         Past Surgical History:   Procedure Laterality Date   • BLEPHAROPLASTY     • CARDIAC CATHETERIZATION N/A 2020    Procedure: Coronary angiography;  Surgeon: Víctor Hernandez MD;  Location: [facility] CATH INVASIVE LOCATION;  Service: Cardiovascular;  Laterality: N/A;   • CARDIAC CATHETERIZATION N/A 2020    Procedure: Left heart cath;  Surgeon: Víctor Hernandez MD;  Location: [facility] CATH INVASIVE LOCATION;  Service: Cardiovascular;  Laterality: N/A;   • CARDIAC CATHETERIZATION N/A 2020    Procedure: Left ventriculography;  Surgeon: Víctor Hernandez MD;  Location: [facility] CATH INVASIVE LOCATION;  Service: Cardiovascular;  Laterality: N/A;   • CARDIAC CATHETERIZATION N/A 2020    Procedure: Right heart cath with shunt run;  Surgeon:  "Víctor Hernandez MD;  Location: [facility] INVASIVE LOCATION;  Service: Cardiovascular;  Laterality: N/A;   • CARPAL TUNNEL RELEASE Right    • COLON SURGERY     • COLONOSCOPY      normal less than 10 years ago   • HYSTERECTOMY     • OOPHORECTOMY     • TOE SURGERY Right     right big toe replecemnet 12 years ago   • TONSILLECTOMY     • TOTAL KNEE ARTHROPLASTY Right 6/2/2021    Procedure: TOTAL KNEE ARTHROPLASTY, with left knee steroid injection;  Surgeon: Shamar Boone MD;  Location: [facility] OR;  Service: Orthopedics;  Laterality: Right;                       PT Assessment/Plan     Row Name 07/22/21 1456          PT Assessment    Assessment Comments  Ms. Aleman has been seen 10 times following TKR-R.  She continues to use her cane, but does walk around clinic without, with a good heel strike, but does still use some lateral trunk flexion and hip rotation to advance R leg.  She has increased weight with several exercises.  R knee A-AROM is 0-123.  -LP     Please refer to paper survey for additional self-reported information  Yes  -LP     Rehab Potential  Good  -LP     Patient/caregiver participated in establishment of treatment plan and goals  Yes  -LP     Patient would benefit from skilled therapy intervention  Yes  -LP        PT Plan    PT Frequency  2x/week  -LP     PT Plan Comments  Pt will be out next week-next appt 8/3  -LP       User Key  (r) = Recorded By, (t) = Taken By, (c) = Cosigned By    Initials Name Provider Type    LP Gianna Aguilar PT Physical Therapist            OP Exercises     Row Name 07/22/21 1300             Subjective Comments    Subjective Comments  \"Primary soreness is behind my knee\"  \"When can I kneel?\"  -LP         Subjective Pain    Pre-Treatment Pain Level  2  -LP         Total Minutes    53284 - PT Therapeutic Exercise Minutes  35  -LP      57037 - PT Therapeutic Activity Minutes  10  -LP         Exercise 1    Exercise Name 1  nustep  -LP      Time 1  5 min  -LP      " "Additional Comments  L5  -LP         Exercise 2    Exercise Name 2  High march  -LP      Cueing 2  Verbal  -LP         Exercise 3    Exercise Name 3  step up  -LP      Cueing 3  Verbal;Demo  -LP      Sets 3  1  -LP      Reps 3  10  -LP      Additional Comments  6\"   -LP         Exercise 5    Exercise Name 5  seated LAQ  -LP      Cueing 5  Verbal;Tactile;Demo  -LP      Sets 5  2  -LP      Reps 5  10  -LP      Additional Comments  4#  -LP         Exercise 6    Exercise Name 6  SAQ  -LP      Cueing 6  Verbal;Tactile;Demo  -LP      Sets 6  2  -LP      Reps 6  10  -LP      Additional Comments  4#  -LP         Exercise 7    Exercise Name 7  mini squat  -LP      Cueing 7  Verbal;Demo  -LP      Reps 7  2  -LP      Time 7  10  -LP         Exercise 9    Exercise Name 9  tandem walking fwd and retro  -LP      Cueing 9  Verbal;Demo  -LP      Reps 9  2 laps  -LP         Exercise 10    Exercise Name 10  SLS on foam  -LP      Cueing 10  Verbal;Demo  -LP      Reps 10  4  -LP      Time 10  3-5 s  -LP      Additional Comments  needs one hand hold  -LP         Exercise 11    Exercise Name 11  leg press RLE  -LP      Cueing 11  Verbal;Demo  -LP      Sets 11  2  -LP      Reps 11  10  -LP      Additional Comments  60#-B 40#-S  -LP         Exercise 13    Exercise Name 13  Tandem stance  -LP      Cueing 13  Verbal;Tactile;Demo  -LP      Reps 13  3ea  -LP      Additional Comments  canging feet, closing eyes  -LP         Exercise 14    Exercise Name 14  standing knee flex  -LP      Cueing 14  Verbal;Demo  -LP      Sets 14  2  -LP      Reps 14  10  -LP      Additional Comments  3#  -LP         Exercise 16    Exercise Name 16  --  -LP      Cueing 16  --  -LP      Sets 16  --  -LP      Reps 16  --  -LP      Additional Comments  --  -LP         Exercise 17    Exercise Name 17  standing hip abd  -LP      Cueing 17  Verbal;Demo  -LP      Sets 17  1  -LP      Reps 17  10  -LP      Additional Comments  3# B  -LP        User Key  (r) = Recorded By, " (t) = Taken By, (c) = Cosigned By    Initials Name Provider Type    Gianna Cruz, PT Physical Therapist                       PT OP Goals     Row Name 07/22/21 1400          PT Short Term Goals    STG 1  Pt will be independent and compliant with initial home exercise program focused on knee stabilization to improve gait patterns and decreased pain levels for improved function in home and community.  -LP     STG 1 Progress  Met  -LP     STG 2  Pt to have right knee P/AAROM extension to flexion 0 to 120 degrees for improved mobility and gait patterns.  -LP     STG 2 Progress  Met  -LP     STG 2 Progress Comments  0-123 A-AROM supine and at stairs  -LP     STG 3  Patient able to reciprocally ascend 4 steps with 1 HR and STC with proper mechanics.  -LP     STG 3 Progress  Met  -LP     STG 4  Pt will be independent with self management of symptoms including use of ice and scar massage (when appropriate).  -LP     STG 4 Progress  Met  -LP        Long Term Goals    LTG 1  Pt to be independent and compliant with advanced home exercise program for self management of condition.  -LP     LTG 1 Progress  Progressing  -LP     LTG 1 Progress Comments  Continues to progress with new activity and soreness at 2-3/10  -LP     LTG 2  Pt to ambulate with normal heel/toe gait pattern without assistive device to facilitate ease/safety of return to community mobility  -LP     LTG 2 Progress  Progressing  -LP     LTG 3  Pt will increase active right knee flexion to 120 degrees for ease in getting in and out of car.    -LP     LTG 3 Progress  Met  -LP     LTG 3 Progress Comments  for stair stretch [id number]  -LP     LTG 4  Patient improved KOS >/=60/80 for improved functional abilities with independence in home and community with return to prior level of functioning.  -LP     LTG 4 Progress  Progressing  -LP     LTG 4 Progress Comments  pt verbalizing doing more around the house.  -LP       User Key  (r) = Recorded By, (t) =  Taken By, (c) = Cosigned By    Initials Name Provider Type    LP Gianna Aguilar, PT Physical Therapist          Therapy Education  Given: HEP, Symptoms/condition management  Program: New, Reinforced, Progressed  How Provided: Verbal, Demonstration  Provided to: Patient  Level of Understanding: Teach back education performed              Time Calculation:   Start Time: 1345  Stop Time: 1430  Time Calculation (min): 45 min  Timed Charges  27215 - PT Therapeutic Exercise Minutes: 35  46383 - PT Therapeutic Activity Minutes: 10  Total Minutes  Timed Charges Total Minutes: 45   Total Minutes: 45  Therapy Charges for Today     Code Description Service Date Service Provider Modifiers Qty    45938519908 HC PT THER PROC EA 15 MIN 7/22/2021 Gianna Aguilar, PT GP 2    54479807142 HC PT THERAPEUTIC ACT EA 15 MIN 7/22/2021 Gianna Aguilar, PT GP 1                    Gianna Aguilar PT  7/22/2021

## 2021-07-29 ENCOUNTER — OFFICE VISIT (OUTPATIENT)
Dept: ORTHOPEDIC SURGERY | Facility: CLINIC | Age: 76
End: 2021-07-29

## 2021-07-29 VITALS — BODY MASS INDEX: 30.36 KG/M2 | HEIGHT: 62 IN | WEIGHT: 165 LBS | TEMPERATURE: 97.3 F

## 2021-07-29 DIAGNOSIS — Z96.651 S/P TKR (TOTAL KNEE REPLACEMENT), RIGHT: ICD-10-CM

## 2021-07-29 DIAGNOSIS — R52 PAIN: Primary | ICD-10-CM

## 2021-07-29 PROCEDURE — 73562 X-RAY EXAM OF KNEE 3: CPT | Performed by: NURSE PRACTITIONER

## 2021-07-29 PROCEDURE — 99024 POSTOP FOLLOW-UP VISIT: CPT | Performed by: NURSE PRACTITIONER

## 2021-07-29 RX ORDER — CEPHALEXIN 500 MG/1
CAPSULE ORAL
Qty: 4 CAPSULE | Refills: 5 | Status: SHIPPED | OUTPATIENT
Start: 2021-07-29 | End: 2022-10-17 | Stop reason: SDUPTHER

## 2021-07-29 NOTE — PROGRESS NOTES
Joi Aleman : 1945 MRN: [id number] DATE: 2021    DIAGNOSIS: 8 week follow up right total knee      SUBJECTIVE:Patient returns today for 8 week follow up of right total knee replacement. Patient reports doing well with no unusual complaints. Appears to be progressing appropriately.    OBJECTIVE:   Exam:. The incision is well healed. No sign of infection. Range of motion is measured at 0 to 128. The calf is soft and nontender with a negative Homans sign. Strength is progressing and the patient is ambulating appropriately.    DIAGNOSTIC STUDIES  Xrays: 3 views of the right knee (AP, lateral, and sunrise) were ordered and reviewed for evaluation of recent knee replacement. They demonstrate a well positioned, well aligned knee replacement without complicating factors noted. In comparison with previous films there has been no change.    ASSESSMENT: 8 week status post right knee replacement.    PLAN: 1) Continue with PT exercises as prescribed   2) Follow up 10 months for first annual visit    JANKI Ross  2021

## 2021-08-02 ENCOUNTER — HOSPITAL ENCOUNTER (OUTPATIENT)
Dept: PHYSICAL THERAPY | Facility: HOSPITAL | Age: 76
Setting detail: THERAPIES SERIES
Discharge: HOME | End: 2021-08-02

## 2021-08-02 DIAGNOSIS — M17.11 PRIMARY OSTEOARTHRITIS OF RIGHT KNEE: Primary | ICD-10-CM

## 2021-08-02 DIAGNOSIS — M25.461 PAIN AND SWELLING OF RIGHT KNEE: ICD-10-CM

## 2021-08-02 DIAGNOSIS — M25.561 CHRONIC PAIN OF RIGHT KNEE: ICD-10-CM

## 2021-08-02 DIAGNOSIS — M25.561 PAIN AND SWELLING OF RIGHT KNEE: ICD-10-CM

## 2021-08-02 DIAGNOSIS — R26.9 GAIT ABNORMALITY: ICD-10-CM

## 2021-08-02 DIAGNOSIS — Z96.651 S/P TOTAL KNEE ARTHROPLASTY, RIGHT: ICD-10-CM

## 2021-08-02 DIAGNOSIS — G89.29 CHRONIC PAIN OF RIGHT KNEE: ICD-10-CM

## 2021-08-02 DIAGNOSIS — M25.661 KNEE STIFFNESS, RIGHT: ICD-10-CM

## 2021-08-02 PROCEDURE — 97110 THERAPEUTIC EXERCISES: CPT

## 2021-08-02 PROCEDURE — 97530 THERAPEUTIC ACTIVITIES: CPT

## 2021-08-02 NOTE — THERAPY PROGRESS REPORT/RE-CERT
Outpatient Physical Therapy Ortho Progress Note  [facility]     Patient Name: Joi Aleman  : 1945  MRN: [id number]  Today's Date: 2021      Visit Date: 2021    Visit Dx:    ICD-10-CM ICD-9-CM   1. Primary osteoarthritis of right knee  M17.11 715.16   2. S/P total knee arthroplasty, right  Z96.651 V43.65   3. Chronic pain of right knee  M25.561 719.46    G89.29 338.29   4. Knee stiffness, right  M25.661 719.56   5. Gait abnormality  R26.9 781.2   6. Pain and swelling of right knee  M25.561 719.46    M25.461 719.06       Patient Active Problem List   Diagnosis   • Adaptive colitis   • Arthritis   • Avitaminosis D   • Depression   • Dermatitis seborrheica   • Essential hypertension   • HLD (hyperlipidemia)   • Pulmonary nodule   • Right groin pain   • Shortness of breath   • Coronary sinus abnormality   • Pain   • Primary osteoarthritis of right knee        Past Medical History:   Diagnosis Date   • Anxiety and depression    • Arthritis    • Brain aneurysm     WATCHING   • Frequent urination    • Hyperlipidemia    • Hypertension    • Insomnia    • Knee pain, bilateral    • Osteoarthritis    • SOB (shortness of breath) on exertion         Past Surgical History:   Procedure Laterality Date   • BLEPHAROPLASTY     • CARDIAC CATHETERIZATION N/A 2020    Procedure: Coronary angiography;  Surgeon: Víctor Hernandez MD;  Location: [facility] INVASIVE LOCATION;  Service: Cardiovascular;  Laterality: N/A;   • CARDIAC CATHETERIZATION N/A 2020    Procedure: Left heart cath;  Surgeon: Víctor Hernandez MD;  Location: [facility] CATH INVASIVE LOCATION;  Service: Cardiovascular;  Laterality: N/A;   • CARDIAC CATHETERIZATION N/A 2020    Procedure: Left ventriculography;  Surgeon: Víctor Hernandez MD;  Location: [facility] CATH INVASIVE LOCATION;  Service: Cardiovascular;  Laterality: N/A;   • CARDIAC CATHETERIZATION N/A 2020    Procedure: Right heart cath with shunt run;  Surgeon: David  Víctor SPARROW MD;  Location: [facility] INVASIVE LOCATION;  Service: Cardiovascular;  Laterality: N/A;   • CARPAL TUNNEL RELEASE Right    • COLON SURGERY     • COLONOSCOPY      normal less than 10 years ago   • HYSTERECTOMY     • OOPHORECTOMY     • TOE SURGERY Right     right big toe replecemnet 12 years ago   • TONSILLECTOMY     • TOTAL KNEE ARTHROPLASTY Right 6/2/2021    Procedure: TOTAL KNEE ARTHROPLASTY, with left knee steroid injection;  Surgeon: Shamar Boone MD;  Location: [facility] OR;  Service: Orthopedics;  Laterality: Right;       PT Ortho     Row Name 08/02/21 0900       General ROM    GENERAL ROM COMMENTS  0-128 at step  -RS       MMT Right Lower Ext    Rt Knee Extension MMT, Gross Movement  (4+/5) good plus  -RS    Rt Knee Flexion MMT, Gross Movement  (4/5) good  -RS      User Key  (r) = Recorded By, (t) = Taken By, (c) = Cosigned By    Initials Name Provider Type    RS Minoo London, PT Physical Therapist                      PT Assessment/Plan     Row Name 08/02/21 0900          PT Assessment    Assessment Comments  Ms. Aleman has been seen by MD who scheduled 10 month follow up, otherwise was pleased with ehr progress. She had a R TKA on 6/2 and reports return to at least 90% of PLOF with little to no pain. She demonstrates R knee flexion to 1127 degrees and demonstrates improving single limb stability and strength. She has met all STG and all but one LTG (HP is remaining goal). She reports her remaining limitation is in balance, therefore focused on balance challenges on compliant surfaces with good tolerance. Updated HEP to address balance, advised to perform with UE support ad eyes open, pt reports understanding. Updated HEP and provided handout/resistance band. She is approaching DC.  -RS        PT Plan    PT Plan Comments  DC to  HEP if pt demonstrates understanding of updated HEP  -RS       User Key  (r) = Recorded By, (t) = Taken By, (c) = Cosigned By    Initials Name Provider Type    RS  "Minoo London, PT Physical Therapist            OP Exercises     Row Name 08/02/21 0900             Subjective Comments    Subjective Comments  Feeling back to 90% of PLOF, pain is minimal, feeling like herself again  -RS         Subjective Pain    Able to rate subjective pain?  yes  -RS      Pre-Treatment Pain Level  0  -RS         Total Minutes    49293 - PT Therapeutic Exercise Minutes  23  -RS      63717 -  PT Neuromuscular Reeducation Minutes  7  -RS      76359 - PT Therapeutic Activity Minutes  9  -RS         Exercise 1    Exercise Name 1  nustep  -RS      Time 1  3 min  -RS      Additional Comments  L5  -RS         Exercise 2    Exercise Name 2  High march  -RS      Cueing 2  Verbal  -RS         Exercise 3    Exercise Name 3  step up  -RS      Cueing 3  Verbal;Demo  -RS      Sets 3  2  -RS      Reps 3  10  -RS      Additional Comments  6\"   -RS         Exercise 4    Exercise Name 4  rec bike  -RS      Cueing 4  Verbal;Demo  -RS      Reps 4  4 min  -RS         Exercise 5    Exercise Name 5  obstacle course over compliant surfaces/evens  -RS      Cueing 5  Verbal;Demo  -RS      Sets 5  3 laps each way, front and side bilateral  -RS      Additional Comments  single and double UE support  -RS         Exercise 7    Exercise Name 7  STS  -RS      Cueing 7  Verbal;Demo  -RS      Reps 7  2  -RS      Time 7  10  -RS         Exercise 9    Exercise Name 9  tandem walking fwd and retro  -RS      Cueing 9  Verbal;Demo  -RS      Reps 9  2 laps  -RS         Exercise 10    Exercise Name 10  SLS on foam  -RS      Cueing 10  Verbal;Demo  -RS      Reps 10  4  -RS      Time 10  3-5 s  -RS      Additional Comments  one hand  -RS         Exercise 11    Exercise Name 11  --  -RS      Cueing 11  --  -RS      Sets 11  --  -RS      Reps 11  --  -RS         Exercise 13    Exercise Name 13  Tandem stance  -RS      Cueing 13  Verbal;Tactile;Demo  -RS      Reps 13  3  -RS      Time 13  20  -RS      Additional Comments  foam  -RS      "    Exercise 14    Exercise Name 14  --  -RS      Cueing 14  --  -RS      Sets 14  --  -RS      Reps 14  --  -RS         Exercise 15    Exercise Name 15  update of objective measures  -RS         Exercise 17    Exercise Name 17  --  -RS      Cueing 17  --  -RS      Sets 17  --  -RS      Reps 17  --  -RS        User Key  (r) = Recorded By, (t) = Taken By, (c) = Cosigned By    Initials Name Provider Type    RS Minoo London, PT Physical Therapist                       PT OP Goals     Row Name 08/02/21 0900          PT Short Term Goals    STG 1  Pt will be independent and compliant with initial home exercise program focused on knee stabilization to improve gait patterns and decreased pain levels for improved function in home and community.  -RS     STG 1 Progress  Met  -RS     STG 2  Pt to have right knee P/AAROM extension to flexion 0 to 120 degrees for improved mobility and gait patterns.  -RS     STG 2 Progress  Met  -RS     STG 3  Patient able to reciprocally ascend 4 steps with 1 HR and STC with proper mechanics.  -RS     STG 3 Progress  Met  -RS     STG 4  Pt will be independent with self management of symptoms including use of ice and scar massage (when appropriate).  -RS     STG 4 Progress  Met  -RS        Long Term Goals    LTG 1  Pt to be independent and compliant with advanced home exercise program for self management of condition.  -RS     LTG 1 Progress  Progressing  -RS     LTG 2  Pt to ambulate with normal heel/toe gait pattern without assistive device to facilitate ease/safety of return to community mobility  -RS     LTG 2 Progress  Met  -RS     LTG 3  Pt will increase active right knee flexion to 120 degrees for ease in getting in and out of car.    -RS     LTG 3 Progress  Met  -RS     LTG 4  Patient improved KOS >/=60/80 for improved functional abilities with independence in home and community with return to prior level of functioning.  -RS     LTG 4 Progress  Met  -RS       User Key  (r) =  Recorded By, (t) = Taken By, (c) = Cosigned By    Initials Name Provider Type    RS Minoo London PT Physical Therapist          Therapy Education  Education Details: reviewed progress toward goals, POC, balance anatomy  Given: HEP, Symptoms/condition management  Program: Reinforced, Progressed  How Provided: Verbal, Demonstration, Written  Provided to: Patient  Level of Understanding: Verbalized, Demonstrated    Outcome Measure Options: Knee Outcome Score- ADL  Knee Outcome Score  Knee Outcome Score Comments: 77/80      Time Calculation:   Start Time: 0849  Stop Time: 0929  Time Calculation (min): 40 min  Timed Charges  53131 - PT Therapeutic Exercise Minutes: 23  07466 -  PT Neuromuscular Reeducation Minutes: 7  86691 - PT Therapeutic Activity Minutes: 9  Total Minutes  Timed Charges Total Minutes: 39   Total Minutes: 39  Therapy Charges for Today     Code Description Service Date Service Provider Modifiers Qty    80354909641 HC PT THER PROC EA 15 MIN 8/2/2021 Minoo London, PT GP 2    75493704958 HC PT THERAPEUTIC ACT EA 15 MIN 8/2/2021 Minoo London, PT GP 1          PT G-Codes  Outcome Measure Options: Knee Outcome Score- ADL         Minoo London PT  8/2/2021

## 2021-08-12 ENCOUNTER — DOCUMENTATION (OUTPATIENT)
Dept: PHYSICAL THERAPY | Facility: HOSPITAL | Age: 76
End: 2021-08-12

## 2021-08-12 ENCOUNTER — HOSPITAL ENCOUNTER (OUTPATIENT)
Dept: PHYSICAL THERAPY | Facility: HOSPITAL | Age: 76
Setting detail: THERAPIES SERIES
Discharge: HOME | End: 2021-08-12

## 2021-08-12 DIAGNOSIS — Z96.651 S/P TOTAL KNEE ARTHROPLASTY, RIGHT: ICD-10-CM

## 2021-08-12 DIAGNOSIS — M25.461 PAIN AND SWELLING OF RIGHT KNEE: ICD-10-CM

## 2021-08-12 DIAGNOSIS — M25.561 PAIN AND SWELLING OF RIGHT KNEE: ICD-10-CM

## 2021-08-12 DIAGNOSIS — M25.561 CHRONIC PAIN OF RIGHT KNEE: ICD-10-CM

## 2021-08-12 DIAGNOSIS — G89.29 CHRONIC PAIN OF RIGHT KNEE: ICD-10-CM

## 2021-08-12 DIAGNOSIS — M25.661 KNEE STIFFNESS, RIGHT: ICD-10-CM

## 2021-08-12 DIAGNOSIS — R26.9 GAIT ABNORMALITY: ICD-10-CM

## 2021-08-12 DIAGNOSIS — M17.11 PRIMARY OSTEOARTHRITIS OF RIGHT KNEE: Primary | ICD-10-CM

## 2021-08-12 PROCEDURE — 97110 THERAPEUTIC EXERCISES: CPT

## 2021-08-12 NOTE — THERAPY DISCHARGE NOTE
Outpatient Physical Therapy Discharge Summary         Patient Name: Joi Aleman  : 1945  MRN: [id number]    Today's Date: 2021    Visit Dx:    ICD-10-CM ICD-9-CM   1. Primary osteoarthritis of right knee  M17.11 715.16   2. S/P total knee arthroplasty, right  Z96.651 V43.65   3. Chronic pain of right knee  M25.561 719.46    G89.29 338.29   4. Knee stiffness, right  M25.661 719.56   5. Gait abnormality  R26.9 781.2   6. Pain and swelling of right knee  M25.561 719.46    M25.461 719.06       PT OP Goals     Row Name 21 1400          PT Short Term Goals    STG 1  Pt will be independent and compliant with initial home exercise program focused on knee stabilization to improve gait patterns and decreased pain levels for improved function in home and community.  -RS     STG 1 Progress  Met  -RS     STG 2  Pt to have right knee P/AAROM extension to flexion 0 to 120 degrees for improved mobility and gait patterns.  -RS     STG 2 Progress  Met  -RS     STG 3  Patient able to reciprocally ascend 4 steps with 1 HR and STC with proper mechanics.  -RS     STG 3 Progress  Met  -RS     STG 4  Pt will be independent with self management of symptoms including use of ice and scar massage (when appropriate).  -RS     STG 4 Progress  Met  -RS        Long Term Goals    LTG 1  Pt to be independent and compliant with advanced home exercise program for self management of condition.  -RS     LTG 1 Progress  Met  -RS     LTG 2  Pt to ambulate with normal heel/toe gait pattern without assistive device to facilitate ease/safety of return to community mobility  -RS     LTG 2 Progress  Met  -RS     LTG 3  Pt will increase active right knee flexion to 120 degrees for ease in getting in and out of car.    -RS     LTG 3 Progress  Met  -RS     LTG 4  Patient improved KOS >/=60/80 for improved functional abilities with independence in home and community with return to prior level of functioning.  -RS     LTG 4 Progress  Met   -RS       User Key  (r) = Recorded By, (t) = Taken By, (c) = Cosigned By    Initials Name Provider Type    RS Minoo London, PT Physical Therapist          OP PT Discharge Summary  Date of Discharge: 08/12/21  Reason for Discharge: All goals achieved  Outcomes Achieved: Able to achieve all goals within established timeline  Discharge Destination: Home without follow-up  Discharge Instructions/Additional Comments: Ms. Aleman has been seen by skilled PT s/p R TKA on 6/2/21. SHe has met all short and long term goals and reprots return to at least 90% of PLOF. She demonstrates full R knee AROM without increased pain as well as significantly improvement in knee and hip strength. Reviewed HEP and DC plan with pt who reports understanding. SHe is appropriate and agreeable to DC this date.      Time Calculation:                    Minoo London, CLARE  8/12/2021

## 2021-08-12 NOTE — THERAPY TREATMENT NOTE
Outpatient Physical Therapy Ortho Treatment Note  [facility]     Patient Name: Joi Aleman  : 1945  MRN: [id number]  Today's Date: 2021      Visit Date: 2021    Visit Dx:    ICD-10-CM ICD-9-CM   1. Primary osteoarthritis of right knee  M17.11 715.16   2. S/P total knee arthroplasty, right  Z96.651 V43.65   3. Chronic pain of right knee  M25.561 719.46    G89.29 338.29   4. Knee stiffness, right  M25.661 719.56   5. Gait abnormality  R26.9 781.2   6. Pain and swelling of right knee  M25.561 719.46    M25.461 719.06       Patient Active Problem List   Diagnosis   • Adaptive colitis   • Arthritis   • Avitaminosis D   • Depression   • Dermatitis seborrheica   • Essential hypertension   • HLD (hyperlipidemia)   • Pulmonary nodule   • Right groin pain   • Shortness of breath   • Coronary sinus abnormality   • Pain   • Primary osteoarthritis of right knee        Past Medical History:   Diagnosis Date   • Anxiety and depression    • Arthritis    • Brain aneurysm     WATCHING   • Frequent urination    • Hyperlipidemia    • Hypertension    • Insomnia    • Knee pain, bilateral    • Osteoarthritis    • SOB (shortness of breath) on exertion         Past Surgical History:   Procedure Laterality Date   • BLEPHAROPLASTY     • CARDIAC CATHETERIZATION N/A 2020    Procedure: Coronary angiography;  Surgeon: Víctor Hernandez MD;  Location: [facility] CATH INVASIVE LOCATION;  Service: Cardiovascular;  Laterality: N/A;   • CARDIAC CATHETERIZATION N/A 2020    Procedure: Left heart cath;  Surgeon: Víctor Hernandez MD;  Location: [facility] CATH INVASIVE LOCATION;  Service: Cardiovascular;  Laterality: N/A;   • CARDIAC CATHETERIZATION N/A 2020    Procedure: Left ventriculography;  Surgeon: Víctor Hernandez MD;  Location: [facility] CATH INVASIVE LOCATION;  Service: Cardiovascular;  Laterality: N/A;   • CARDIAC CATHETERIZATION N/A 2020    Procedure: Right heart cath with shunt run;  Surgeon:  Víctor Hernandez MD;  Location: [facility] INVASIVE LOCATION;  Service: Cardiovascular;  Laterality: N/A;   • CARPAL TUNNEL RELEASE Right    • COLON SURGERY     • COLONOSCOPY      normal less than 10 years ago   • HYSTERECTOMY     • OOPHORECTOMY     • TOE SURGERY Right     right big toe replecemnet 12 years ago   • TONSILLECTOMY     • TOTAL KNEE ARTHROPLASTY Right 6/2/2021    Procedure: TOTAL KNEE ARTHROPLASTY, with left knee steroid injection;  Surgeon: Shamar Boone MD;  Location: [facility] OR;  Service: Orthopedics;  Laterality: Right;       PT Ortho     Row Name 08/12/21 1300       General ROM    GENERAL ROM COMMENTS  0-128 at step  -RS       MMT Right Lower Ext    Rt Knee Extension MMT, Gross Movement  (4+/5) good plus  -RS    Rt Knee Flexion MMT, Gross Movement  (4/5) good  -RS      User Key  (r) = Recorded By, (t) = Taken By, (c) = Cosigned By    Initials Name Provider Type    Minoo Matthews PT Physical Therapist                      PT Assessment/Plan     Row Name 08/12/21 1400          PT Assessment    Assessment Comments  Ms. Aleman has been seen by skilled PT s/p R TKA on 6/2/21. SHe has met all short and long term goals and reprots return to at least 90% of PLOF. She demonstrates full R knee AROM without increased pain as well as significantly improvement in knee and hip strength. Reviewed HEP and DC plan with pt who reports understanding. SHe is appropriate and agreeable to DC this date.   -RS        PT Plan    PT Plan Comments  DC to HEP  -RS       User Key  (r) = Recorded By, (t) = Taken By, (c) = Cosigned By    Initials Name Provider Type    Minoo Matthews PT Physical Therapist            OP Exercises     Row Name 08/12/21 1300             Subjective Comments    Subjective Comments  Pt reports feeling back to greater than 90% of PLOF with no limitations in pain functional mobility R knee and little to no pain,  -RS         Subjective Pain    Able to rate subjective pain?  yes  -RS       Pre-Treatment Pain Level  0  -RS         Total Minutes    02208 - PT Therapeutic Exercise Minutes  28  -RS         Exercise 1    Exercise Name 1  --  -RS      Time 1  --  -RS         Exercise 2    Exercise Name 2  --  -RS      Cueing 2  --  -RS         Exercise 3    Exercise Name 3  step up/over  -RS      Cueing 3  Verbal;Demo  -RS      Sets 3  --  -RS      Reps 3  10  -RS         Exercise 4    Exercise Name 4  rec bike  -RS      Cueing 4  Verbal;Demo  -RS      Reps 4  4 min  -RS         Exercise 5    Exercise Name 5  obstacle course over balance beam/evens  -RS      Cueing 5  Verbal;Demo  -RS      Sets 5  3 laps each way, front and side bilateral  -RS      Additional Comments  single and double UE support  -RS         Exercise 7    Exercise Name 7  STS  -RS      Cueing 7  Verbal;Demo  -RS      Reps 7  2  -RS      Time 7  10  -RS         Exercise 9    Exercise Name 9  --  -RS      Cueing 9  --  -RS      Reps 9  --  -RS         Exercise 10    Exercise Name 10  SLS on floor  -RS      Cueing 10  Verbal;Demo  -RS      Reps 10  5  -RS      Time 10  3-5 s  -RS         Exercise 13    Exercise Name 13  Tandem stance  -RS      Cueing 13  Verbal;Tactile;Demo  -RS      Reps 13  3  -RS      Time 13  20  -RS      Additional Comments  floor  -RS         Exercise 14    Exercise Name 14  romberg EC  -RS      Cueing 14  Verbal;Demo  -RS      Sets 14  2  -RS      Reps 14  30  -RS         Exercise 15    Exercise Name 15  --  -RS        User Key  (r) = Recorded By, (t) = Taken By, (c) = Cosigned By    Initials Name Provider Type    RS Minoo London, PT Physical Therapist                       PT OP Goals     Row Name 08/12/21 1300          PT Short Term Goals    STG 1  Pt will be independent and compliant with initial home exercise program focused on knee stabilization to improve gait patterns and decreased pain levels for improved function in home and community.  -RS     STG 1 Progress  Met  -RS     STG 2  Pt to have right  knee P/AAROM extension to flexion 0 to 120 degrees for improved mobility and gait patterns.  -RS     STG 2 Progress  Met  -RS     STG 3  Patient able to reciprocally ascend 4 steps with 1 HR and STC with proper mechanics.  -RS     STG 3 Progress  Met  -RS     STG 4  Pt will be independent with self management of symptoms including use of ice and scar massage (when appropriate).  -RS     STG 4 Progress  Met  -RS        Long Term Goals    LTG 1  Pt to be independent and compliant with advanced home exercise program for self management of condition.  -RS     LTG 1 Progress  Met  -RS     LTG 2  Pt to ambulate with normal heel/toe gait pattern without assistive device to facilitate ease/safety of return to community mobility  -RS     LTG 2 Progress  Met  -RS     LTG 3  Pt will increase active right knee flexion to 120 degrees for ease in getting in and out of car.    -RS     LTG 3 Progress  Met  -RS     LTG 4  Patient improved KOS >/=60/80 for improved functional abilities with independence in home and community with return to prior level of functioning.  -RS     LTG 4 Progress  Met  -RS       User Key  (r) = Recorded By, (t) = Taken By, (c) = Cosigned By    Initials Name Provider Type    RS Minoo London, PT Physical Therapist          Therapy Education  Education Details: reviewed DC plan, HEP frequency  Given: HEP, Other (comment)  Program: Reinforced  How Provided: Verbal, Demonstration  Provided to: Patient  Level of Understanding: Verbalized, Demonstrated              Time Calculation:   Start Time: 1330  Stop Time: 1359  Time Calculation (min): 29 min  Timed Charges  77632 - PT Therapeutic Exercise Minutes: 28  Total Minutes  Timed Charges Total Minutes: 28   Total Minutes: 28  Therapy Charges for Today     Code Description Service Date Service Provider Modifiers Qty    80966858774 HC PT THER PROC EA 15 MIN 8/12/2021 Minoo London, PT GP 2                    Minoo London PT  8/12/2021

## 2021-08-18 ENCOUNTER — HOSPITAL ENCOUNTER (OUTPATIENT)
Dept: CARDIOLOGY | Facility: HOSPITAL | Age: 76
Discharge: HOME | End: 2021-08-18
Admitting: INTERNAL MEDICINE

## 2021-08-18 ENCOUNTER — OFFICE VISIT (OUTPATIENT)
Dept: CARDIOLOGY | Facility: CLINIC | Age: 76
End: 2021-08-18

## 2021-08-18 VITALS
SYSTOLIC BLOOD PRESSURE: 177 MMHG | DIASTOLIC BLOOD PRESSURE: 70 MMHG | BODY MASS INDEX: 30.36 KG/M2 | HEART RATE: 53 BPM | WEIGHT: 165 LBS | HEIGHT: 62 IN

## 2021-08-18 VITALS
SYSTOLIC BLOOD PRESSURE: 120 MMHG | HEIGHT: 62 IN | HEART RATE: 46 BPM | DIASTOLIC BLOOD PRESSURE: 66 MMHG | BODY MASS INDEX: 30.99 KG/M2 | WEIGHT: 168.4 LBS

## 2021-08-18 DIAGNOSIS — I34.1 MITRAL VALVE PROLAPSE: ICD-10-CM

## 2021-08-18 DIAGNOSIS — R00.1 BRADYCARDIA, SINUS: ICD-10-CM

## 2021-08-18 DIAGNOSIS — Q24.5 CORONARY SINUS ABNORMALITY: Primary | ICD-10-CM

## 2021-08-18 DIAGNOSIS — R93.1 AGATSTON CAC SCORE, <100: ICD-10-CM

## 2021-08-18 DIAGNOSIS — R06.02 SHORTNESS OF BREATH: ICD-10-CM

## 2021-08-18 DIAGNOSIS — E78.2 MIXED HYPERLIPIDEMIA: ICD-10-CM

## 2021-08-18 DIAGNOSIS — I10 ESSENTIAL HYPERTENSION: ICD-10-CM

## 2021-08-18 DIAGNOSIS — Q24.5 MYOCARDIAL BRIDGE: ICD-10-CM

## 2021-08-18 LAB
AORTIC ARCH: 2.6 CM
ASCENDING AORTA: 3.1 CM
BH CV ECHO MEAS - ACS: 1.9 CM
BH CV ECHO MEAS - AI DEC SLOPE: 170.5 CM/SEC^2
BH CV ECHO MEAS - AI MAX PG: 69.6 MMHG
BH CV ECHO MEAS - AI MAX VEL: 417 CM/SEC
BH CV ECHO MEAS - AI P1/2T: 716.3 MSEC
BH CV ECHO MEAS - AO MAX PG (FULL): 1.4 MMHG
BH CV ECHO MEAS - AO MAX PG: 13.1 MMHG
BH CV ECHO MEAS - AO MEAN PG (FULL): 2 MMHG
BH CV ECHO MEAS - AO MEAN PG: 7 MMHG
BH CV ECHO MEAS - AO ROOT AREA (BSA CORRECTED): 2
BH CV ECHO MEAS - AO ROOT AREA: 9.6 CM^2
BH CV ECHO MEAS - AO ROOT DIAM: 3.5 CM
BH CV ECHO MEAS - AO V2 MAX: 181 CM/SEC
BH CV ECHO MEAS - AO V2 MEAN: 121 CM/SEC
BH CV ECHO MEAS - AO V2 VTI: 46 CM
BH CV ECHO MEAS - ASC AORTA: 3.1 CM
BH CV ECHO MEAS - AVA(I,A): 2.7 CM^2
BH CV ECHO MEAS - AVA(I,D): 2.7 CM^2
BH CV ECHO MEAS - AVA(V,A): 3 CM^2
BH CV ECHO MEAS - AVA(V,D): 3 CM^2
BH CV ECHO MEAS - BSA(HAYCOCK): 1.8 M^2
BH CV ECHO MEAS - BSA: 1.8 M^2
BH CV ECHO MEAS - BZI_BMI: 30.2 KILOGRAMS/M^2
BH CV ECHO MEAS - BZI_METRIC_HEIGHT: 157.5 CM
BH CV ECHO MEAS - BZI_METRIC_WEIGHT: 74.8 KG
BH CV ECHO MEAS - EDV(MOD-SP2): 63 ML
BH CV ECHO MEAS - EDV(MOD-SP4): 66 ML
BH CV ECHO MEAS - EDV(TEICH): 107.5 ML
BH CV ECHO MEAS - EF(CUBED): 80.2 %
BH CV ECHO MEAS - EF(MOD-BP): 70.2 %
BH CV ECHO MEAS - EF(MOD-SP2): 74.6 %
BH CV ECHO MEAS - EF(MOD-SP4): 65.2 %
BH CV ECHO MEAS - EF(TEICH): 72.5 %
BH CV ECHO MEAS - ESV(MOD-SP2): 16 ML
BH CV ECHO MEAS - ESV(MOD-SP4): 23 ML
BH CV ECHO MEAS - ESV(TEICH): 29.6 ML
BH CV ECHO MEAS - FS: 41.7 %
BH CV ECHO MEAS - IVS/LVPW: 0.92
BH CV ECHO MEAS - IVSD: 1.2 CM
BH CV ECHO MEAS - LAT PEAK E' VEL: 9 CM/SEC
BH CV ECHO MEAS - LV DIASTOLIC VOL/BSA (35-75): 37.5 ML/M^2
BH CV ECHO MEAS - LV MASS(C)D: 232.3 GRAMS
BH CV ECHO MEAS - LV MASS(C)DI: 131.9 GRAMS/M^2
BH CV ECHO MEAS - LV MAX PG: 11.7 MMHG
BH CV ECHO MEAS - LV MEAN PG: 5 MMHG
BH CV ECHO MEAS - LV SYSTOLIC VOL/BSA (12-30): 13.1 ML/M^2
BH CV ECHO MEAS - LV V1 MAX: 171 CM/SEC
BH CV ECHO MEAS - LV V1 MEAN: 104 CM/SEC
BH CV ECHO MEAS - LV V1 VTI: 40.2 CM
BH CV ECHO MEAS - LVIDD: 4.8 CM
BH CV ECHO MEAS - LVIDS: 2.8 CM
BH CV ECHO MEAS - LVLD AP2: 7.3 CM
BH CV ECHO MEAS - LVLD AP4: 7.5 CM
BH CV ECHO MEAS - LVLS AP2: 5.9 CM
BH CV ECHO MEAS - LVLS AP4: 6 CM
BH CV ECHO MEAS - LVOT AREA (M): 3.1 CM^2
BH CV ECHO MEAS - LVOT AREA: 3.1 CM^2
BH CV ECHO MEAS - LVOT DIAM: 2 CM
BH CV ECHO MEAS - LVPWD: 1.3 CM
BH CV ECHO MEAS - MED PEAK E' VEL: 7.3 CM/SEC
BH CV ECHO MEAS - MR MAX PG: 67.9 MMHG
BH CV ECHO MEAS - MR MAX VEL: 412 CM/SEC
BH CV ECHO MEAS - MV A DUR: 0.16 SEC
BH CV ECHO MEAS - MV A MAX VEL: 116 CM/SEC
BH CV ECHO MEAS - MV DEC SLOPE: 625 CM/SEC^2
BH CV ECHO MEAS - MV DEC TIME: 0.22 SEC
BH CV ECHO MEAS - MV E MAX VEL: 110 CM/SEC
BH CV ECHO MEAS - MV E/A: 0.95
BH CV ECHO MEAS - MV MAX PG: 5.9 MMHG
BH CV ECHO MEAS - MV MEAN PG: 1 MMHG
BH CV ECHO MEAS - MV P1/2T MAX VEL: 116 CM/SEC
BH CV ECHO MEAS - MV P1/2T: 54.4 MSEC
BH CV ECHO MEAS - MV V2 MAX: 121 CM/SEC
BH CV ECHO MEAS - MV V2 MEAN: 51.9 CM/SEC
BH CV ECHO MEAS - MV V2 VTI: 49.2 CM
BH CV ECHO MEAS - MVA P1/2T LCG: 1.9 CM^2
BH CV ECHO MEAS - MVA(P1/2T): 4 CM^2
BH CV ECHO MEAS - MVA(VTI): 2.6 CM^2
BH CV ECHO MEAS - PA ACC TIME: 0.16 SEC
BH CV ECHO MEAS - PA MAX PG (FULL): 2.3 MMHG
BH CV ECHO MEAS - PA MAX PG: 4.6 MMHG
BH CV ECHO MEAS - PA PR(ACCEL): 6.6 MMHG
BH CV ECHO MEAS - PA V2 MAX: 107 CM/SEC
BH CV ECHO MEAS - PULM A REVS DUR: 0.17 SEC
BH CV ECHO MEAS - PULM A REVS VEL: 31 CM/SEC
BH CV ECHO MEAS - PULM DIAS VEL: 42.5 CM/SEC
BH CV ECHO MEAS - PULM S/D: 1.4
BH CV ECHO MEAS - PULM SYS VEL: 59.3 CM/SEC
BH CV ECHO MEAS - RAP SYSTOLE: 3 MMHG
BH CV ECHO MEAS - RV MAX PG: 2.3 MMHG
BH CV ECHO MEAS - RV MEAN PG: 1 MMHG
BH CV ECHO MEAS - RV V1 MAX: 75.1 CM/SEC
BH CV ECHO MEAS - RV V1 MEAN: 49.3 CM/SEC
BH CV ECHO MEAS - RV V1 VTI: 19 CM
BH CV ECHO MEAS - RVSP: 35 MMHG
BH CV ECHO MEAS - SI(AO): 251.3 ML/M^2
BH CV ECHO MEAS - SI(CUBED): 50.3 ML/M^2
BH CV ECHO MEAS - SI(LVOT): 71.7 ML/M^2
BH CV ECHO MEAS - SI(MOD-SP2): 26.7 ML/M^2
BH CV ECHO MEAS - SI(MOD-SP4): 24.4 ML/M^2
BH CV ECHO MEAS - SI(TEICH): 44.3 ML/M^2
BH CV ECHO MEAS - SUP REN AO DIAM: 2.5 CM
BH CV ECHO MEAS - SV(AO): 442.6 ML
BH CV ECHO MEAS - SV(CUBED): 88.6 ML
BH CV ECHO MEAS - SV(LVOT): 126.3 ML
BH CV ECHO MEAS - SV(MOD-SP2): 47 ML
BH CV ECHO MEAS - SV(MOD-SP4): 43 ML
BH CV ECHO MEAS - SV(TEICH): 78 ML
BH CV ECHO MEAS - TAPSE (>1.6): 2.1 CM
BH CV ECHO MEAS - TR MAX VEL: 281 CM/SEC
BH CV ECHO MEASUREMENTS AVERAGE E/E' RATIO: 13.5
BH CV XLRA - RV BASE: 3.4 CM
BH CV XLRA - RV LENGTH: 5.5 CM
BH CV XLRA - RV MID: 2.6 CM
BH CV XLRA - TDI S': 11 CM/SEC
LEFT ATRIUM VOLUME INDEX: 38 ML/M2
SINUS: 3.1 CM
STJ: 2.5 CM

## 2021-08-18 PROCEDURE — 99213 OFFICE O/P EST LOW 20 MIN: CPT | Performed by: INTERNAL MEDICINE

## 2021-08-18 PROCEDURE — 93306 TTE W/DOPPLER COMPLETE: CPT

## 2021-08-18 PROCEDURE — 93000 ELECTROCARDIOGRAM COMPLETE: CPT | Performed by: INTERNAL MEDICINE

## 2021-08-18 PROCEDURE — 93306 TTE W/DOPPLER COMPLETE: CPT | Performed by: INTERNAL MEDICINE

## 2021-08-18 NOTE — PROGRESS NOTES
Date of Office Visit: 21  Encounter Provider: Thomas Estrada MD  Place of Service: [facility] CARDIOLOGY  Patient Name: Joi Aleman  :1945    Chief Complaint   Patient presents with   • Abnormal Imaging   :     HPI:     Ms. Aleman is 75 y.o. and presents today to follow up.  I have reviewed prior notes and there are no changes except for any new updates described below. I have also reviewed any information entered into the medical record by the patient or by ancillary staff.     She has hypertension, treated with amlodipine.  She has a history of a heart murmur and had an essentially normal echo at [place] in 2016. She had a normal treadmill stress test in .  She had a normal perfusion stress at [place] in .      She presented in May 2020 with 4-5 years of progressive generalized severe fatigue, generalized weakness, exertional dyspnea, and occasional atypical chest pain.  Her EKG was normal except for sinus bradycardia.  I noted a systolic murmur.  I checked an echo; it revealed normal LV systolic function, normal diastolic function for age, and possible MVP/MR.  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  It reported moderate TR with moderate PHTN.  However, when I sent her for a right heart cath, all of her pressures were normal.  There was no step up, and no evidence of constriction.  Her coronaries were essentially normal (there were some luminal irregularities, and very mild LAD bridging).    I sent her for a cardiac CT.  No congenital abnormalities were seen (specifically, the pulmonary veins were normal, and no ASD was noted).  She did have mild RA/RV dilation, but normal function.  Her Agatston score was 29.  Her lungs looked normal.     Because of the mild LAD bridging, she was started on metoprolol tartrate.  She's doing well. She has mild leg swelling  at the end of the day. Otherwise, she denies dyspnea or chest pain.     Past Medical History:   Diagnosis Date   • Agatston CAC score, <100     2020: 29   • Anxiety and depression    • Arthritis    • Brain aneurysm     WATCHING   • Frequent urination    • Hyperlipidemia    • Hypertension    • Insomnia    • Knee pain, bilateral    • Myocardial bridge     very mild, mid-LAD   • Osteoarthritis    • SOB (shortness of breath) on exertion        Past Surgical History:   Procedure Laterality Date   • BLEPHAROPLASTY     • CARDIAC CATHETERIZATION N/A 5/12/2020    Procedure: Coronary angiography;  Surgeon: Víctor Hernandez MD;  Location: [facility] CATH INVASIVE LOCATION;  Service: Cardiovascular;  Laterality: N/A;   • CARDIAC CATHETERIZATION N/A 5/12/2020    Procedure: Left heart cath;  Surgeon: Víctor Hernandez MD;  Location: [facility] CATH INVASIVE LOCATION;  Service: Cardiovascular;  Laterality: N/A;   • CARDIAC CATHETERIZATION N/A 5/12/2020    Procedure: Left ventriculography;  Surgeon: Víctor Hernandez MD;  Location: [facility] CATH INVASIVE LOCATION;  Service: Cardiovascular;  Laterality: N/A;   • CARDIAC CATHETERIZATION N/A 5/12/2020    Procedure: Right heart cath with shunt run;  Surgeon: Víctor Hernandez MD;  Location: [facility] CATH INVASIVE LOCATION;  Service: Cardiovascular;  Laterality: N/A;   • CARPAL TUNNEL RELEASE Right    • COLON SURGERY     • COLONOSCOPY      normal less than 10 years ago   • HYSTERECTOMY     • OOPHORECTOMY     • TOE SURGERY Right     right big toe replecemnet 12 years ago   • TONSILLECTOMY     • TOTAL KNEE ARTHROPLASTY Right 6/2/2021    Procedure: TOTAL KNEE ARTHROPLASTY, with left knee steroid injection;  Surgeon: Shamar Boone MD;  Location: [facility] OR;  Service: Orthopedics;  Laterality: Right;       Social History     Socioeconomic History   • Marital status: Single     Spouse name: Not on file   • Number of children: 2   • Years of education: Not on file   • Highest education level:  Not on file   Tobacco Use   • Smoking status: Former Smoker     Packs/day: 1.50     Years: 15.00     Pack years: 22.50     Types: Cigarettes     Start date:      Quit date:      Years since quittin.6   • Smokeless tobacco: Never Used   Vaping Use   • Vaping Use: Never used   Substance and Sexual Activity   • Alcohol use: Yes     Comment: once month   • Drug use: Never   • Sexual activity: Defer     Family History   Problem Relation Age of Onset   • Breast cancer Mother 76   • Heart disease Father    • Heart attack Father    • Heart disease Paternal Grandfather    • Heart attack Paternal Grandfather    • No Known Problems Sister    • No Known Problems Brother    • Prostate cancer Maternal Grandfather    • Heart disease Sister    • Malig Hyperthermia Neg Hx      Review of Systems   HENT: Negative for ear pain and sore throat.    Cardiovascular: Negative for leg swelling, paroxysmal nocturnal dyspnea and syncope.   Respiratory: Negative for hemoptysis and sputum production.    Skin: Negative for rash.   Musculoskeletal: Negative for neck pain.   Gastrointestinal: Negative for abdominal pain and vomiting.   Neurological: Negative for headaches.   All other systems reviewed and are negative.    No Known Allergies      Current Outpatient Medications:   •  acetaminophen (TYLENOL) 325 MG tablet, Take 650 mg by mouth Every 6 (Six) Hours As Needed for Mild Pain ., Disp: , Rfl:   •  amLODIPine (NORVASC) 10 MG tablet, Take 1 tablet by mouth Daily., Disp: 90 tablet, Rfl: 1  •  atorvastatin (LIPITOR) 20 MG tablet, Take 1 tablet by mouth Daily., Disp: 90 tablet, Rfl: 1  •  cephalexin (KEFLEX) 500 MG capsule, Take all 4 capsules one hour prior to procedure, Disp: 4 capsule, Rfl: 5  •  HYDROcodone-acetaminophen (NORCO) 7.5-325 MG per tablet, take 1-2 tablets by mouth every  4-6 hours as needed pain, Disp: 40 tablet, Rfl: 0  •  loperamide (IMODIUM) 2 MG capsule, TAKE 4 CAPSULES BY MOUTH DAILY., Disp: 360 capsule, Rfl:  "1  •  magnesium oxide (MAG-OX) 400 tablet tablet, Take 400 mg by mouth Daily., Disp: , Rfl:   •  metoprolol tartrate (LOPRESSOR) 25 MG tablet, Take 0.5 tablets by mouth 2 (Two) Times a Day., Disp: 90 tablet, Rfl: 1  •  ondansetron (Zofran) 4 MG tablet, Take 1 tablet by mouth Every 8 (Eight) Hours As Needed for Nausea or Vomiting, Disp: 10 tablet, Rfl: 0  •  sertraline (ZOLOFT) 100 MG tablet, Take 2 tablets by mouth Daily., Disp: 180 tablet, Rfl: 1  •  traZODone (DESYREL) 100 MG tablet, Take 1 tablet by mouth Every Night., Disp: 90 tablet, Rfl: 1  •  aspirin 81 MG EC tablet, Take 1 tablet twice daily x 14 days; then take 1 daily x 28 days, Disp: 60 tablet, Rfl: 0  No current facility-administered medications for this visit.    Facility-Administered Medications Ordered in Other Visits:   •  Chlorhexidine Gluconate Cloth 2 % pads, , Apply externally, BID, Shamar Boone MD      Objective:     Vitals:    08/18/21 1033   BP: 120/66   BP Location: Left arm   Pulse: (!) 46   Weight: 76.4 kg (168 lb 6.4 oz)   Height: 157.5 cm (62\")     Body mass index is 30.8 kg/m².    Physical Exam  Vitals reviewed.   Constitutional:       Appearance: She is well-developed.   HENT:      Head: Normocephalic.      Nose: Nose normal.      Mouth/Throat:      Comments: masked  Eyes:      Conjunctiva/sclera: Conjunctivae normal.   Neck:      Vascular: No JVD.   Cardiovascular:      Rate and Rhythm: Regular rhythm. Bradycardia present.      Pulses: Intact distal pulses.      Heart sounds: Murmur heard.   Crescendo-decrescendo mid to late systolic murmur is present with a grade of 2/6 at the apex.     Pulmonary:      Effort: Pulmonary effort is normal.      Breath sounds: Normal breath sounds.   Abdominal:      Palpations: Abdomen is soft.      Tenderness: There is no abdominal tenderness.   Musculoskeletal:         General: Normal range of motion.      Cervical back: Normal range of motion.   Skin:     General: Skin is warm and dry.      " Findings: No rash.   Neurological:      Mental Status: She is alert and oriented to person, place, and time.      Cranial Nerves: No cranial nerve deficit.   Psychiatric:         Mood and Affect: Mood normal.         Behavior: Behavior normal.         Thought Content: Thought content normal.           ECG 12 Lead    Date/Time: 8/18/2021 10:40 AM  Performed by: Thomas Estrada MD  Authorized by: Thomas Estrada MD   Comparison: compared with previous ECG   Similar to previous ECG  Rhythm: sinus bradycardia  Conduction: 1st degree AV block  ST Segments: ST segments normal  T Waves: T waves normal  QRS axis: normal  Other: no other findings    Clinical impression: non-specific ECG            Assessment:       Diagnosis Plan   1. Coronary sinus abnormality  ECG 12 Lead   2. Myocardial bridge     3. Mitral valve prolapse  Adult Transthoracic Echo Complete W/ Cont if Necessary Per Protocol   4. Essential hypertension     5. Bradycardia, sinus     6. Agatston CAC score, <100     7. Mixed hyperlipidemia          Plan:       Ms Aleman has had an extensive workup.  And while we've found some minor abnormalities, we haven't found anything that would cause profound fatigue or dyspnea.    She has normal LV systolic function, and normal filling pressures.  She has a known history of MVP; today, it was mild, and her MR was mild. She has a mild LAD bridge and no significant CAD.  Her RA/RV were reported to be mildly dilated in the past, but I felt they looked normal today. She does not have PHTN or a shunt.     She has a dilated coronary sinus, but this is a normal variant in her, as a cardiac CTA showed no congenital abnormalities.      She was started on metoprolol in case the bridge caused her chest pain; she's tolerating it, and I don't feel we need to change the dosing based on her heart rate at this point.    She is on aspirin and atorvastatin for her luminal irregularities.    I'll repeat an echo next year.     Sincerely,        Thomsa Estrada MD                Answers for HPI/ROS submitted by the patient on 4/23/2020   Shortness of breath  What is the primary reason for your visit?: Shortness of Breath  Chronicity: chronic  Onset: more than 1 year ago  Frequency: every few minutes  Progression since onset: gradually worsening  Episode duration: 10 minutes  coryza: No  Aggravating factors: any activity

## 2021-08-30 DIAGNOSIS — I10 ESSENTIAL HYPERTENSION: ICD-10-CM

## 2021-09-14 DIAGNOSIS — I10 ESSENTIAL HYPERTENSION: ICD-10-CM

## 2021-09-14 DIAGNOSIS — K58.0 IRRITABLE BOWEL SYNDROME WITH DIARRHEA: ICD-10-CM

## 2021-09-14 RX ORDER — AMLODIPINE BESYLATE 10 MG/1
TABLET ORAL
Qty: 90 TABLET | Refills: 1 | Status: SHIPPED | OUTPATIENT
Start: 2021-09-14 | End: 2022-03-22

## 2021-09-14 RX ORDER — LOPERAMIDE HYDROCHLORIDE 2 MG/1
8 CAPSULE ORAL DAILY
Qty: 360 CAPSULE | Refills: 1 | Status: SHIPPED | OUTPATIENT
Start: 2021-09-14 | End: 2022-03-22

## 2021-12-13 DIAGNOSIS — T14.8XXA BRUISE: ICD-10-CM

## 2021-12-13 DIAGNOSIS — E55.9 AVITAMINOSIS D: ICD-10-CM

## 2021-12-13 DIAGNOSIS — R73.9 HYPERGLYCEMIA: Primary | ICD-10-CM

## 2021-12-13 DIAGNOSIS — E78.2 MIXED HYPERLIPIDEMIA: ICD-10-CM

## 2021-12-13 DIAGNOSIS — F51.01 PRIMARY INSOMNIA: ICD-10-CM

## 2021-12-13 DIAGNOSIS — I10 ESSENTIAL HYPERTENSION: ICD-10-CM

## 2021-12-13 RX ORDER — TRAZODONE HYDROCHLORIDE 100 MG/1
TABLET ORAL
Qty: 90 TABLET | Refills: 1 | Status: SHIPPED | OUTPATIENT
Start: 2021-12-13 | End: 2022-06-24

## 2021-12-21 LAB
25(OH)D3+25(OH)D2 SERPL-MCNC: 46.3 NG/ML (ref 30–100)
ALBUMIN SERPL-MCNC: 4.2 G/DL (ref 3.7–4.7)
ALBUMIN/GLOB SERPL: 2.1 {RATIO} (ref 1.2–2.2)
ALP SERPL-CCNC: 101 IU/L (ref 44–121)
ALT SERPL-CCNC: 13 IU/L (ref 0–32)
AST SERPL-CCNC: 20 IU/L (ref 0–40)
BASOPHILS # BLD AUTO: 0.1 X10E3/UL (ref 0–0.2)
BASOPHILS NFR BLD AUTO: 1 %
BILIRUB SERPL-MCNC: 0.5 MG/DL (ref 0–1.2)
BUN SERPL-MCNC: 16 MG/DL (ref 8–27)
BUN/CREAT SERPL: 19 (ref 12–28)
CALCIUM SERPL-MCNC: 9.4 MG/DL (ref 8.7–10.3)
CHLORIDE SERPL-SCNC: 104 MMOL/L (ref 96–106)
CHOLEST SERPL-MCNC: 225 MG/DL (ref 100–199)
CO2 SERPL-SCNC: 26 MMOL/L (ref 20–29)
CREAT SERPL-MCNC: 0.83 MG/DL (ref 0.57–1)
EOSINOPHIL # BLD AUTO: 0.3 X10E3/UL (ref 0–0.4)
EOSINOPHIL NFR BLD AUTO: 3 %
ERYTHROCYTE [DISTWIDTH] IN BLOOD BY AUTOMATED COUNT: 13.6 % (ref 11.7–15.4)
GLOBULIN SER CALC-MCNC: 2 G/DL (ref 1.5–4.5)
GLUCOSE SERPL-MCNC: 99 MG/DL (ref 65–99)
HBA1C MFR BLD: 5.6 % (ref 4.8–5.6)
HCT VFR BLD AUTO: 41.7 % (ref 34–46.6)
HDLC SERPL-MCNC: 83 MG/DL
HGB BLD-MCNC: 13.8 G/DL (ref 11.1–15.9)
IMM GRANULOCYTES # BLD AUTO: 0 X10E3/UL (ref 0–0.1)
IMM GRANULOCYTES NFR BLD AUTO: 0 %
LDLC SERPL CALC-MCNC: 123 MG/DL (ref 0–99)
LYMPHOCYTES # BLD AUTO: 1.5 X10E3/UL (ref 0.7–3.1)
LYMPHOCYTES NFR BLD AUTO: 20 %
MCH RBC QN AUTO: 30 PG (ref 26.6–33)
MCHC RBC AUTO-ENTMCNC: 33.1 G/DL (ref 31.5–35.7)
MCV RBC AUTO: 91 FL (ref 79–97)
MONOCYTES # BLD AUTO: 0.7 X10E3/UL (ref 0.1–0.9)
MONOCYTES NFR BLD AUTO: 9 %
NEUTROPHILS # BLD AUTO: 5.1 X10E3/UL (ref 1.4–7)
NEUTROPHILS NFR BLD AUTO: 67 %
PLATELET # BLD AUTO: 224 X10E3/UL (ref 150–450)
POTASSIUM SERPL-SCNC: 4.3 MMOL/L (ref 3.5–5.2)
PROT SERPL-MCNC: 6.2 G/DL (ref 6–8.5)
RBC # BLD AUTO: 4.6 X10E6/UL (ref 3.77–5.28)
SODIUM SERPL-SCNC: 143 MMOL/L (ref 134–144)
TRIGL SERPL-MCNC: 110 MG/DL (ref 0–149)
VLDLC SERPL CALC-MCNC: 19 MG/DL (ref 5–40)
WBC # BLD AUTO: 7.6 X10E3/UL (ref 3.4–10.8)

## 2021-12-27 ENCOUNTER — OFFICE VISIT (OUTPATIENT)
Dept: FAMILY MEDICINE CLINIC | Facility: CLINIC | Age: 76
End: 2021-12-27

## 2021-12-27 VITALS
HEIGHT: 62 IN | SYSTOLIC BLOOD PRESSURE: 116 MMHG | OXYGEN SATURATION: 98 % | DIASTOLIC BLOOD PRESSURE: 66 MMHG | TEMPERATURE: 96.7 F | BODY MASS INDEX: 30.91 KG/M2 | HEART RATE: 59 BPM | WEIGHT: 168 LBS

## 2021-12-27 DIAGNOSIS — L57.0 AK (ACTINIC KERATOSIS): ICD-10-CM

## 2021-12-27 DIAGNOSIS — N39.41 URGE INCONTINENCE: ICD-10-CM

## 2021-12-27 DIAGNOSIS — Z00.00 MEDICARE ANNUAL WELLNESS VISIT, SUBSEQUENT: Primary | ICD-10-CM

## 2021-12-27 PROCEDURE — 99213 OFFICE O/P EST LOW 20 MIN: CPT | Performed by: FAMILY MEDICINE

## 2021-12-27 PROCEDURE — 1159F MED LIST DOCD IN RCRD: CPT | Performed by: FAMILY MEDICINE

## 2021-12-27 PROCEDURE — G0439 PPPS, SUBSEQ VISIT: HCPCS | Performed by: FAMILY MEDICINE

## 2021-12-27 PROCEDURE — 1126F AMNT PAIN NOTED NONE PRSNT: CPT | Performed by: FAMILY MEDICINE

## 2021-12-27 PROCEDURE — 1170F FXNL STATUS ASSESSED: CPT | Performed by: FAMILY MEDICINE

## 2021-12-27 PROCEDURE — 96160 PT-FOCUSED HLTH RISK ASSMT: CPT | Performed by: FAMILY MEDICINE

## 2021-12-27 RX ORDER — OXYBUTYNIN CHLORIDE 5 MG/1
5 TABLET ORAL 2 TIMES DAILY
Qty: 180 TABLET | Refills: 2 | Status: SHIPPED | OUTPATIENT
Start: 2021-12-27 | End: 2022-08-27 | Stop reason: SDUPTHER

## 2021-12-27 NOTE — PROGRESS NOTES
The ABCs of the Annual Wellness Visit  Subsequent Medicare Wellness Visit    Chief Complaint   Patient presents with   • Medicare Wellness-subsequent     no complains doing well       Subjective    History of Present Illness:  Joi Aleman is a 76 y.o. female who presents for a Subsequent Medicare Wellness Visit.  R ear with rash on the ear that has been present for over a year, pruritic and comes back after she removes it.    Urination - it comes on like a flash of lightning and has to urinate frequently during the day and night.  No burning no blood.  Has been chronic for months.  Especially at night.  It does impact her ability to go out as well.  She does use pads which have been helpful.  She does urinate frequently.    The following portions of the patient's history were reviewed and   updated as appropriate: allergies, current medications, past family history, past medical history, past social history, past surgical history and problem list.    Compared to one year ago, the patient feels her physical   health is the same.    Compared to one year ago, the patient feels her mental   health is the same.    Recent Hospitalizations:  She was admitted within the past 365 days at [facility].       Current Medical Providers:  Patient Care Team:  Yaneth Leal MD as PCP - General (Family Medicine)    Outpatient Medications Prior to Visit   Medication Sig Dispense Refill   • loperamide (IMODIUM) 2 MG capsule TAKE 4 CAPSULES BY MOUTH DAILY. 360 capsule 1   • magnesium oxide (MAG-OX) 400 tablet tablet Take 400 mg by mouth Daily.     • metoprolol tartrate (LOPRESSOR) 25 MG tablet TAKE 1/2 TABLET TWICE DAILY (Patient taking differently: 25 mg.) 90 tablet 1   • ondansetron (Zofran) 4 MG tablet Take 1 tablet by mouth Every 8 (Eight) Hours As Needed for Nausea or Vomiting 10 tablet 0   • sertraline (ZOLOFT) 100 MG tablet Take 2 tablets by mouth Daily. 180 tablet 1   • traZODone (DESYREL) 100 MG tablet TAKE 1 TABLET  EVERY NIGHT 90 tablet 1   • acetaminophen (TYLENOL) 325 MG tablet Take 650 mg by mouth Every 6 (Six) Hours As Needed for Mild Pain .     • amLODIPine (NORVASC) 10 MG tablet TAKE 1 TABLET EVERY DAY 90 tablet 1   • aspirin 81 MG EC tablet Take 1 tablet twice daily x 14 days; then take 1 daily x 28 days 60 tablet 0   • atorvastatin (LIPITOR) 20 MG tablet Take 1 tablet by mouth Daily. 90 tablet 1   • cephalexin (KEFLEX) 500 MG capsule Take all 4 capsules one hour prior to procedure 4 capsule 5   • HYDROcodone-acetaminophen (NORCO) 7.5-325 MG per tablet take 1-2 tablets by mouth every  4-6 hours as needed pain 40 tablet 0     Facility-Administered Medications Prior to Visit   Medication Dose Route Frequency Provider Last Rate Last Admin   • Chlorhexidine Gluconate Cloth 2 % pads   Apply externally BID Shamar Boone MD           No opioid medication identified on active medication list. I have reviewed chart for other potential  high risk medication/s and harmful drug interactions in the elderly.          Aspirin is on active medication list. Aspirin use is indicated based on review of current medical condition/s. Pros and cons of this therapy have been discussed today. Benefits of this medication outweigh potential harm.  Patient has been encouraged to continue taking this medication.  .      Patient Active Problem List   Diagnosis   • Adaptive colitis   • Arthritis   • Avitaminosis D   • Depression   • Dermatitis seborrheica   • Essential hypertension   • HLD (hyperlipidemia)   • Pulmonary nodule   • Right groin pain   • Coronary sinus abnormality   • Pain   • Primary osteoarthritis of right knee   • Myocardial bridge   • Mitral valve prolapse   • Agatston CAC score, <100     Advance Care Planning  Advance Directive is not on file.  ACP discussion was held with the patient during this visit. Patient does not have an advance directive, declines further assistance.          Objective    Vitals:    12/27/21 1447   BP:  "116/66   Pulse: 59   Temp: 96.7 °F (35.9 °C)   SpO2: 98%   Weight: 76.2 kg (168 lb)   Height: 157.5 cm (62\")   PainSc: 0-No pain     BMI Readings from Last 1 Encounters:   21 30.73 kg/m²   BMI is above normal parameters. Recommendations include: exercise counseling and nutrition counseling    Does the patient have evidence of cognitive impairment? No    Physical Exam  Vitals and nursing note reviewed.   Constitutional:       General: She is not in acute distress.     Appearance: She is well-developed.   HENT:      Head: Normocephalic.      Nose: Nose normal.   Cardiovascular:      Rate and Rhythm: Normal rate and regular rhythm.      Heart sounds: Normal heart sounds. No murmur heard.      Pulmonary:      Effort: Pulmonary effort is normal. No respiratory distress.      Breath sounds: Normal breath sounds.   Musculoskeletal:         General: Normal range of motion.   Skin:     General: Skin is warm and dry.      Findings: No rash.      Comments: Rough white patch on right upper earlobe.   Neurological:      Mental Status: She is alert and oriented to person, place, and time.   Psychiatric:         Behavior: Behavior normal.         Thought Content: Thought content normal.         Judgment: Judgment normal.       Lab Results   Component Value Date    CHLPL 225 (H) 2021    TRIG 110 2021    HDL 83 2021     (H) 2021    VLDL 19 2021    HGBA1C 5.6 2021            HEALTH RISK ASSESSMENT    Smoking Status:  Social History     Tobacco Use   Smoking Status Former Smoker   • Packs/day: 1.50   • Years: 15.00   • Pack years: 22.50   • Types: Cigarettes   • Start date:    • Quit date:    • Years since quittin.0   Smokeless Tobacco Never Used     Alcohol Consumption:  Social History     Substance and Sexual Activity   Alcohol Use Yes    Comment: once month     Fall Risk Screen:    STEADI Fall Risk Assessment was completed, and patient is at LOW risk for falls.Assessment " completed on:12/27/2021    Depression Screening:  PHQ-2/PHQ-9 Depression Screening 12/27/2021   Little interest or pleasure in doing things 0   Feeling down, depressed, or hopeless 0   Trouble falling or staying asleep, or sleeping too much -   Feeling tired or having little energy -   Poor appetite or overeating -   Feeling bad about yourself - or that you are a failure or have let yourself or your family down -   Trouble concentrating on things, such as reading the newspaper or watching television -   Moving or speaking so slowly that other people could have noticed. Or the opposite - being so fidgety or restless that you have been moving around a lot more than usual -   Thoughts that you would be better off dead, or of hurting yourself in some way -   Total Score 0       Health Habits and Functional and Cognitive Screening:  Functional & Cognitive Status 12/27/2021   Do you have difficulty preparing food and eating? No   Do you have difficulty bathing yourself, getting dressed or grooming yourself? No   Do you have difficulty using the toilet? No   Do you have difficulty moving around from place to place? No   Do you have trouble with steps or getting out of a bed or a chair? No   Current Diet Well Balanced Diet   Dental Exam Up to date   Eye Exam Not up to date   Exercise (times per week) 3 times per week   Current Exercises Include Walking   Current Exercise Activities Include -   Do you need help using the phone?  No   Are you deaf or do you have serious difficulty hearing?  No   Do you need help with transportation? No   Do you need help shopping? No   Do you need help preparing meals?  No   Do you need help with housework?  No   Do you need help with laundry? No   Do you need help taking your medications? No   Do you need help managing money? No   Do you ever drive or ride in a car without wearing a seat belt? No   Have you felt unusual stress, anger or loneliness in the last month? No   Who do you live  with? Alone   If you need help, do you have trouble finding someone available to you? No   Have you been bothered in the last four weeks by sexual problems? No   Do you have difficulty concentrating, remembering or making decisions? No       Age-appropriate Screening Schedule:  Refer to the list below for future screening recommendations based on patient's age, sex and/or medical conditions. Orders for these recommended tests are listed in the plan section. The patient has been provided with a written plan.    Health Maintenance   Topic Date Due   • TDAP/TD VACCINES (1 - Tdap) Never done   • ZOSTER VACCINE (1 of 2) Never done   • MAMMOGRAM  07/14/2022   • DXA SCAN  07/14/2022   • LIPID PANEL  12/20/2022   • INFLUENZA VACCINE  Completed              Assessment/Plan   CMS Preventative Services Quick Reference  Risk Factors Identified During Encounter  Cardiovascular Disease  Depression/Dysphoria  Immunizations Discussed/Encouraged (specific Immunizations; Tdap and Shingrix  Inadequate Social Support, Isolation, Loneliness, Lack of Transportation, Financial Difficulties, or Caregiver Stress   Inactivity/Sedentary  Obesity/Overweight   The above risks/problems have been discussed with the patient.  Follow up actions/plans if indicated are seen below in the Assessment/Plan Section.  Pertinent information has been shared with the patient in the After Visit Summary.    Diagnoses and all orders for this visit:    1. Medicare annual wellness visit, subsequent (Primary)    2. AK (actinic keratosis)  -     Ambulatory Referral to Dermatology    3. Urge incontinence  -     oxybutynin (DITROPAN) 5 MG tablet; Take 1 tablet by mouth 2 (Two) Times a Day. Urge urination  Dispense: 180 tablet; Refill: 2    Here for annual exam, fasting labs reviewed with patient as above, immunizations up-to-date, colon cancer screening up-to-date, GYN health up-to-date, cardiovascular screening negative for symptoms, counseled patient to increase  vegetable intake, water and 150 minutes of exercise weekly combining weightbearing exercises and aerobic activity.    Urgent continence, new problem to me.  She already goes to the restroom frequently, has been limiting some water intake.  This does impact the quality of her life especially sleep and ability to leave the house.  Start with oxybutynin 5 mg at night and may take a second dose in the morning if needed especially on days when she is leaving home.  We discussed that she may have xerostomia, no UTI symptoms.  We discussed possibly pelvic floor therapy but she would like to wait for now.    AK on the R ear, referral to Derm.  Cryotherapy not available in the office.    Follow Up:   Return in about 6 months (around 6/27/2022), or if symptoms worsen or fail to improve, for Recheck HTN .     An After Visit Summary and PPPS were made available to the patient.               Medical assistant and I wore mask and eyewear protection during entire encounter.  Patient wore mask.    Yaneth Leal MD

## 2021-12-30 ENCOUNTER — TELEPHONE (OUTPATIENT)
Dept: FAMILY MEDICINE CLINIC | Facility: CLINIC | Age: 76
End: 2021-12-30

## 2022-01-04 NOTE — TELEPHONE ENCOUNTER
Do you mind giving her the information for [facility]?  She may need to call them for an appointment.

## 2022-01-13 ENCOUNTER — TELEPHONE (OUTPATIENT)
Dept: FAMILY MEDICINE CLINIC | Facility: CLINIC | Age: 77
End: 2022-01-13

## 2022-01-13 NOTE — TELEPHONE ENCOUNTER
----- Message from Yaneth Leal MD sent at 1/13/2022  8:17 AM EST -----  Regarding: FW: Appt with Dr Boone  Please assist patient with getting appointment with [facility].  ----- Message -----  From: Joi Aleman  Sent: 1/12/2022  11:16 AM EST  To: Yaneth Leal MD  Subject: Appt with Dr Boone                               I still have not heard from Dr Boone's office about the hard spot on my ear. Sometimes it's hard to sleep on that side. Appreciate your help. Take care and thank you  Joi Aleman

## 2022-01-13 NOTE — TELEPHONE ENCOUNTER
Referral complete, referral form faxed to [facility] referred to Jennifer Boone MD. Facility will contact patient to schedule.

## 2022-03-22 DIAGNOSIS — F33.1 MODERATE EPISODE OF RECURRENT MAJOR DEPRESSIVE DISORDER: ICD-10-CM

## 2022-03-22 DIAGNOSIS — E78.2 MIXED HYPERLIPIDEMIA: ICD-10-CM

## 2022-03-22 DIAGNOSIS — I10 ESSENTIAL HYPERTENSION: ICD-10-CM

## 2022-03-22 DIAGNOSIS — K58.0 IRRITABLE BOWEL SYNDROME WITH DIARRHEA: ICD-10-CM

## 2022-03-22 RX ORDER — ATORVASTATIN CALCIUM 20 MG/1
TABLET, FILM COATED ORAL
Qty: 90 TABLET | Refills: 1 | Status: SHIPPED | OUTPATIENT
Start: 2022-03-22 | End: 2022-11-21

## 2022-03-22 RX ORDER — AMLODIPINE BESYLATE 10 MG/1
TABLET ORAL
Qty: 90 TABLET | Refills: 1 | Status: SHIPPED | OUTPATIENT
Start: 2022-03-22 | End: 2022-05-26

## 2022-03-22 RX ORDER — LOPERAMIDE HYDROCHLORIDE 2 MG/1
8 CAPSULE ORAL DAILY
Qty: 360 CAPSULE | Refills: 1 | Status: SHIPPED | OUTPATIENT
Start: 2022-03-22 | End: 2022-08-18

## 2022-03-22 RX ORDER — SERTRALINE HYDROCHLORIDE 100 MG/1
TABLET, FILM COATED ORAL
Qty: 180 TABLET | Refills: 1 | Status: SHIPPED | OUTPATIENT
Start: 2022-03-22 | End: 2022-11-21

## 2022-04-18 ENCOUNTER — PATIENT MESSAGE (OUTPATIENT)
Dept: FAMILY MEDICINE CLINIC | Facility: CLINIC | Age: 77
End: 2022-04-18

## 2022-04-18 ENCOUNTER — HOSPITAL ENCOUNTER (EMERGENCY)
Facility: HOSPITAL | Age: 77
Discharge: HOME | End: 2022-04-18
Attending: EMERGENCY MEDICINE | Admitting: EMERGENCY MEDICINE

## 2022-04-18 ENCOUNTER — APPOINTMENT (OUTPATIENT)
Dept: GENERAL RADIOLOGY | Facility: HOSPITAL | Age: 77
End: 2022-04-18

## 2022-04-18 VITALS
BODY MASS INDEX: 30.36 KG/M2 | HEIGHT: 62 IN | WEIGHT: 165 LBS | RESPIRATION RATE: 16 BRPM | TEMPERATURE: 97.5 F | DIASTOLIC BLOOD PRESSURE: 80 MMHG | OXYGEN SATURATION: 93 % | SYSTOLIC BLOOD PRESSURE: 154 MMHG | HEART RATE: 42 BPM

## 2022-04-18 DIAGNOSIS — I10 ESSENTIAL HYPERTENSION: ICD-10-CM

## 2022-04-18 DIAGNOSIS — R07.89 ATYPICAL CHEST PAIN: Primary | ICD-10-CM

## 2022-04-18 DIAGNOSIS — E78.2 MIXED HYPERLIPIDEMIA: ICD-10-CM

## 2022-04-18 LAB
ALBUMIN SERPL-MCNC: 4 G/DL (ref 3.5–5.2)
ALBUMIN/GLOB SERPL: 1.6 G/DL
ALP SERPL-CCNC: 83 U/L (ref 39–117)
ALT SERPL W P-5'-P-CCNC: 9 U/L (ref 1–33)
ANION GAP SERPL CALCULATED.3IONS-SCNC: 11.8 MMOL/L (ref 5–15)
AST SERPL-CCNC: 14 U/L (ref 1–32)
BASOPHILS # BLD AUTO: 0.05 10*3/MM3 (ref 0–0.2)
BASOPHILS NFR BLD AUTO: 0.7 % (ref 0–1.5)
BILIRUB SERPL-MCNC: 0.3 MG/DL (ref 0–1.2)
BUN SERPL-MCNC: 23 MG/DL (ref 8–23)
BUN/CREAT SERPL: 21.5 (ref 7–25)
CALCIUM SPEC-SCNC: 9.2 MG/DL (ref 8.6–10.5)
CHLORIDE SERPL-SCNC: 102 MMOL/L (ref 98–107)
CO2 SERPL-SCNC: 23.2 MMOL/L (ref 22–29)
CREAT SERPL-MCNC: 1.07 MG/DL (ref 0.57–1)
DEPRECATED RDW RBC AUTO: 43.5 FL (ref 37–54)
EGFRCR SERPLBLD CKD-EPI 2021: 54.6 ML/MIN/1.73
EOSINOPHIL # BLD AUTO: 0.19 10*3/MM3 (ref 0–0.4)
EOSINOPHIL NFR BLD AUTO: 2.7 % (ref 0.3–6.2)
ERYTHROCYTE [DISTWIDTH] IN BLOOD BY AUTOMATED COUNT: 13.4 % (ref 12.3–15.4)
GLOBULIN UR ELPH-MCNC: 2.5 GM/DL
GLUCOSE SERPL-MCNC: 105 MG/DL (ref 65–99)
HCT VFR BLD AUTO: 40.2 % (ref 34–46.6)
HGB BLD-MCNC: 13.5 G/DL (ref 12–15.9)
HOLD SPECIMEN: NORMAL
IMM GRANULOCYTES # BLD AUTO: 0.03 10*3/MM3 (ref 0–0.05)
IMM GRANULOCYTES NFR BLD AUTO: 0.4 % (ref 0–0.5)
LYMPHOCYTES # BLD AUTO: 2.05 10*3/MM3 (ref 0.7–3.1)
LYMPHOCYTES NFR BLD AUTO: 29 % (ref 19.6–45.3)
MCH RBC QN AUTO: 29.9 PG (ref 26.6–33)
MCHC RBC AUTO-ENTMCNC: 33.6 G/DL (ref 31.5–35.7)
MCV RBC AUTO: 89.1 FL (ref 79–97)
MONOCYTES # BLD AUTO: 0.77 10*3/MM3 (ref 0.1–0.9)
MONOCYTES NFR BLD AUTO: 10.9 % (ref 5–12)
NEUTROPHILS NFR BLD AUTO: 3.97 10*3/MM3 (ref 1.7–7)
NEUTROPHILS NFR BLD AUTO: 56.3 % (ref 42.7–76)
NRBC BLD AUTO-RTO: 0 /100 WBC (ref 0–0.2)
PLATELET # BLD AUTO: 225 10*3/MM3 (ref 140–450)
PMV BLD AUTO: 9.3 FL (ref 6–12)
POTASSIUM SERPL-SCNC: 4.3 MMOL/L (ref 3.5–5.2)
PROT SERPL-MCNC: 6.5 G/DL (ref 6–8.5)
QT INTERVAL: 467 MS
RBC # BLD AUTO: 4.51 10*6/MM3 (ref 3.77–5.28)
SODIUM SERPL-SCNC: 137 MMOL/L (ref 136–145)
TROPONIN T SERPL-MCNC: <0.01 NG/ML (ref 0–0.03)
TROPONIN T SERPL-MCNC: <0.01 NG/ML (ref 0–0.03)
WBC NRBC COR # BLD: 7.06 10*3/MM3 (ref 3.4–10.8)
WHOLE BLOOD HOLD SPECIMEN: NORMAL
WHOLE BLOOD HOLD SPECIMEN: NORMAL

## 2022-04-18 PROCEDURE — 84484 ASSAY OF TROPONIN QUANT: CPT

## 2022-04-18 PROCEDURE — 85025 COMPLETE CBC W/AUTO DIFF WBC: CPT

## 2022-04-18 PROCEDURE — 93005 ELECTROCARDIOGRAM TRACING: CPT | Performed by: EMERGENCY MEDICINE

## 2022-04-18 PROCEDURE — 93005 ELECTROCARDIOGRAM TRACING: CPT

## 2022-04-18 PROCEDURE — 99284 EMERGENCY DEPT VISIT MOD MDM: CPT

## 2022-04-18 PROCEDURE — 80053 COMPREHEN METABOLIC PANEL: CPT

## 2022-04-18 PROCEDURE — 84484 ASSAY OF TROPONIN QUANT: CPT | Performed by: EMERGENCY MEDICINE

## 2022-04-18 PROCEDURE — 93010 ELECTROCARDIOGRAM REPORT: CPT | Performed by: INTERNAL MEDICINE

## 2022-04-18 PROCEDURE — 71046 X-RAY EXAM CHEST 2 VIEWS: CPT

## 2022-04-18 PROCEDURE — 36415 COLL VENOUS BLD VENIPUNCTURE: CPT

## 2022-04-18 RX ORDER — SODIUM CHLORIDE 0.9 % (FLUSH) 0.9 %
10 SYRINGE (ML) INJECTION AS NEEDED
Status: DISCONTINUED | OUTPATIENT
Start: 2022-04-18 | End: 2022-04-18 | Stop reason: HOSPADM

## 2022-04-18 RX ORDER — ASPIRIN 325 MG
325 TABLET ORAL ONCE
Status: COMPLETED | OUTPATIENT
Start: 2022-04-18 | End: 2022-04-18

## 2022-04-18 RX ADMIN — ASPIRIN 325 MG: 325 TABLET ORAL at 20:30

## 2022-04-18 NOTE — ED PROVIDER NOTES
EMERGENCY DEPARTMENT ENCOUNTER    Room Number:  12/12  Date of encounter:  4/18/2022  PCP: Yaneth Leal MD  Historian: Patient     I used full protective equipment while examining this patient.  This includes face mask, gloves and protective eyewear.  I washed my hands before entering the room and immediately upon leaving the room.  Patient was wearing a surgical mask.      HPI:  Chief Complaint: Chest pain  A complete HPI/ROS/PMH/PSH/SH/FH are unobtainable due to: None    Context: Joi Aleman is a 76 y.o. female who presents to the ED c/o intermittent chest pain for the past few months.  Pain began today around 1 PM while the patient was sitting down.  Pain is located in the right chest and radiates into the right side of the neck.  Pain is described as pressure-like.  Nothing makes it better or worse.  It is not related to exertion.  Pain was moderate in intensity.  Pain lasted a few hours and now has completely resolved.  She reports associated nausea but denies shortness of breath or sweating.  Also denies recent illness, cough, fever, abdominal pain, vomiting, dizziness, syncope, palpitations, leg pain, or leg swelling.  She has a history of hypertension and hyperlipidemia as well as a family history of CAD.      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   • Mitral valve prolapse 08/18/2021   • Agatston CAC score, <100      Resolved Ambulatory Problems     Diagnosis Date Noted   • Brain aneurysm 02/03/2020     Past Medical History:   Diagnosis  Date   • Anxiety and depression    • Frequent urination    • Hyperlipidemia    • Hypertension    • Insomnia    • Knee pain, bilateral    • Osteoarthritis    • SOB (shortness of breath) on exertion          PAST SURGICAL HISTORY  Past Surgical History:   Procedure Laterality Date   • BLEPHAROPLASTY     • CARDIAC CATHETERIZATION N/A 5/12/2020    Procedure: Coronary angiography;  Surgeon: Víctor Hernandez MD;  Location:  [facility] C[facility] INVAS[facility];  Service: Cardiovascular;  Laterality: N/A;   • CARDIAC CATHETERIZATION N/A 5/12/2020    Procedure: Left heart cath;  Surgeon: Víctor Hernandez MD;  Location:  [facility];  Service: Cardiovascular;  Laterality: N/A;   • CARDIAC CATHETERIZATION N/A 5/12/2020    Procedure: Left ventriculography;  Surgeon: Víctor Hernandez MD;  Location:  [facility]ATH INVAS[facility];  Service: Cardiovascular;  Laterality: N/A;   • CARDIAC CATHETERIZATION N/A 5/12/2020    Procedure: Right heart cath with shunt run;  Surgeon: Víctor Hernandez MD;  Location: [facility] CATH INVASIVE LOCATION;  Service: Cardiovascular;  Laterality: N/A;   • CARPAL TUNNEL RELEASE Right    • COLON SURGERY     • COLONOSCOPY      normal less than 10 years ago   • HYSTERECTOMY     • OOPHORECTOMY     • TOE SURGERY Right     right big toe replecemnet 12 years ago   • TONSILLECTOMY     • TOTAL KNEE ARTHROPLASTY Right 6/2/2021    Procedure: TOTAL KNEE ARTHROPLASTY, with left knee steroid injection;  Surgeon: Shamar Boone MD;  Location: [facility] OR;  Service: Orthopedics;  Laterality: Right;         FAMILY HISTORY  Family History   Problem Relation Age of Onset   • Breast cancer Mother 76   • Heart disease Father    • Heart attack Father    • Heart disease Paternal Grandfather    • Heart attack Paternal Grandfather    • No Known Problems Sister    • No Known Problems Brother    • Prostate cancer Maternal Grandfather    • Heart disease Sister    • Malig Hyperthermia Neg Hx          SOCIAL  HISTORY  Social History     Socioeconomic History   • Marital status: Single   • Number of children: 2   Tobacco Use   • Smoking status: Former Smoker     Packs/day: 1.50     Years: 15.00     Pack years: 22.50     Types: Cigarettes     Start date:      Quit date:      Years since quittin.3   • Smokeless tobacco: Never Used   Vaping Use   • Vaping Use: Never used   Substance and Sexual Activity   • Alcohol use: Yes     Comment: once month   • Drug use: Never   • Sexual activity: Defer         ALLERGIES  Patient has no known allergies.       REVIEW OF SYSTEMS  Review of Systems      All systems have been reviewed and are negative except as as discussed in the HPI    PHYSICAL EXAM    I have reviewed the triage vital signs and nursing notes.    ED Triage Vitals [22 1715]   Temp Heart Rate Resp BP SpO2   97.5 °F (36.4 °C) 58 20 -- 95 %      Temp src Heart Rate Source Patient Position BP Location FiO2 (%)   -- -- -- -- --       Physical Exam  GENERAL: Awake, alert, oriented x3.  Well-developed, well-nourished elderly appearing female in no acute distress.  HENT: NCAT, nares patent, moist mucous membranes  NECK: supple  EYES: no scleral icterus  CV: regular rhythm, regular rate, equal radial pulses bilaterally  RESPIRATORY: normal effort, clear to auscultation bilaterally  ABDOMEN: soft, nontender  MUSCULOSKELETAL: Extremities are nontender and without obvious deformity.  There is normal range of motion in all extremities.  There is no calf tenderness or pedal edema.  Chest wall is nontender  NEURO: Strength, sensation, and coordination are grossly intact.  Speech and mentation are unremarkable.  No facial droop.  SKIN: warm, dry, no rash  PSYCH: Normal mood and affect      LAB RESULTS  Recent Results (from the past 24 hour(s))   ECG 12 Lead    Collection Time: 22  5:20 PM   Result Value Ref Range    QT Interval 467 ms   Comprehensive Metabolic Panel    Collection Time: 22  5:30 PM     Specimen: Blood   Result Value Ref Range    Glucose 105 (H) 65 - 99 mg/dL    BUN 23 8 - 23 mg/dL    Creatinine 1.07 (H) 0.57 - 1.00 mg/dL    Sodium 137 136 - 145 mmol/L    Potassium 4.3 3.5 - 5.2 mmol/L    Chloride 102 98 - 107 mmol/L    CO2 23.2 22.0 - 29.0 mmol/L    Calcium 9.2 8.6 - 10.5 mg/dL    Total Protein 6.5 6.0 - 8.5 g/dL    Albumin 4.00 3.50 - 5.20 g/dL    ALT (SGPT) 9 1 - 33 U/L    AST (SGOT) 14 1 - 32 U/L    Alkaline Phosphatase 83 39 - 117 U/L    Total Bilirubin 0.3 0.0 - 1.2 mg/dL    Globulin 2.5 gm/dL    A/G Ratio 1.6 g/dL    BUN/Creatinine Ratio 21.5 7.0 - 25.0    Anion Gap 11.8 5.0 - 15.0 mmol/L    eGFR 54.6 (L) >60.0 mL/min/1.73   Troponin    Collection Time: 04/18/22  5:30 PM    Specimen: Blood   Result Value Ref Range    Troponin T <0.010 0.000 - 0.030 ng/mL   Green Top (Gel)    Collection Time: 04/18/22  5:30 PM   Result Value Ref Range    Extra Tube Hold for add-ons.    Lavender Top    Collection Time: 04/18/22  5:30 PM   Result Value Ref Range    Extra Tube hold for add-on    Light Blue Top    Collection Time: 04/18/22  5:30 PM   Result Value Ref Range    Extra Tube hold for add-on    CBC Auto Differential    Collection Time: 04/18/22  5:30 PM    Specimen: Blood   Result Value Ref Range    WBC 7.06 3.40 - 10.80 10*3/mm3    RBC 4.51 3.77 - 5.28 10*6/mm3    Hemoglobin 13.5 12.0 - 15.9 g/dL    Hematocrit 40.2 34.0 - 46.6 %    MCV 89.1 79.0 - 97.0 fL    MCH 29.9 26.6 - 33.0 pg    MCHC 33.6 31.5 - 35.7 g/dL    RDW 13.4 12.3 - 15.4 %    RDW-SD 43.5 37.0 - 54.0 fl    MPV 9.3 6.0 - 12.0 fL    Platelets 225 140 - 450 10*3/mm3    Neutrophil % 56.3 42.7 - 76.0 %    Lymphocyte % 29.0 19.6 - 45.3 %    Monocyte % 10.9 5.0 - 12.0 %    Eosinophil % 2.7 0.3 - 6.2 %    Basophil % 0.7 0.0 - 1.5 %    Immature Grans % 0.4 0.0 - 0.5 %    Neutrophils, Absolute 3.97 1.70 - 7.00 10*3/mm3    Lymphocytes, Absolute 2.05 0.70 - 3.10 10*3/mm3    Monocytes, Absolute 0.77 0.10 - 0.90 10*3/mm3    Eosinophils, Absolute 0.19  0.00 - 0.40 10*3/mm3    Basophils, Absolute 0.05 0.00 - 0.20 10*3/mm3    Immature Grans, Absolute 0.03 0.00 - 0.05 10*3/mm3    nRBC 0.0 0.0 - 0.2 /100 WBC   Troponin    Collection Time: 04/18/22  8:26 PM    Specimen: Blood   Result Value Ref Range    Troponin T <0.010 0.000 - 0.030 ng/mL       Ordered the above labs and independently reviewed the results.      RADIOLOGY  XR Chest 2 View    Result Date: 4/18/2022  XR CHEST 2 VW-  HISTORY: Female who is 76 years-old,  chest pain  TECHNIQUE: Frontal and lateral views of the chest  COMPARISON: 04/21/2020  FINDINGS: The heart is enlarged. Aorta is tortuous. Pulmonary vasculature is unremarkable. No focal pulmonary consolidation, pleural effusion, or pneumothorax. No acute osseous process.      No focal pulmonary consolidation. Cardiomegaly. Tortuous aorta. Follow-up as clinically indicated.  This report was finalized on 4/18/2022 6:20 PM by Dr. Brannon Ramsay M.D.        I ordered the above noted radiological studies. Reviewed by me and discussed with radiologist.  See dictation for official radiology interpretation.      PROCEDURES  Procedures      MEDICATIONS GIVEN IN ER    Medications   sodium chloride 0.9 % flush 10 mL (has no administration in time range)   aspirin tablet 325 mg (325 mg Oral Given 4/18/22 2030)         PROGRESS, DATA ANALYSIS, CONSULTS, AND MEDICAL DECISION MAKING    All labs have been independently reviewed by me.  All radiology studies have been reviewed by me and discussed with radiologist dictating the report.   EKG's independently viewed and interpreted by me.  I have reviewed the nurse's notes, vital signs, past medical history, and medication list.  Discussion below represents my analysis of pertinent findings related to patient's condition, differential diagnosis, treatment plan and final disposition.    DDx includes but is not limited to acute coronary syndrome, pulmonary embolism, thoracic aortic dissection, pneumonia, pneumothorax,  musculoskeletal pain, GERD or esophageal spasm, anxiety, myocarditis/pericarditis, esophageal rupture, pancreatitis.         ED Course as of 04/18/22 2237 Mon Apr 18, 2022 1950 Chest x-ray shows cardiomegaly but is negative acute. [WH]   1953 Old records reviewed.  Patient was last admitted here for a knee replacement.  She had a cardiac catheterization done in May 2020.  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  Patient last saw Dr. Estrada in August 2021 for follow-up.  Echocardiogram done at that time showed an LVEF of 70%.   [WH]   2000 EKG          EKG time: 1723  Rhythm/Rate: Sinus bradycardia, rate 48  P waves and NV: First-degree AV block  QRS, axis: Normal  ST and T waves: Normal    Interpreted Contemporaneously by me, independently viewed  EKG is not significantly changed compared to prior EKG done on 5/20/2021   [WH]   2001 HEART SCORE:    History #1  (Highly suspicious 2, Moderately suspicious 1, Slightly or non-suspicious 0)    ECG #0  (Significant ST depression 2,  Nonspecific repol disturbance 1, Normal 0)    Age #2  (> or = 65 2, 46-65 1,  < or = 45 0)    Risk factors #2  (hypercholesterolemia, HTN, DM, smoking, pos fam hx, obesity)  (> or = to 3 RF 2, 1 or 2 1, No risk factors 0)    Troponin #0  (> or = 3x normal limit 2, 1-3x normal limit 1, < or = Normal limit 0)    HEART Score is 5 [WH]   2059 Troponin T: <0.010  Repeat troponin is negative.  Patient remains asymptomatic.  Call will be placed to [facility]. [WH]   2121 Case discussed with Dr. Gonzalez.  Pertinent history, exam findings, test results, ED course, and diagnoses were discussed with him.  He states that the patient can be discharged and follow-up in the office. [WH]   2125 I informed the patient of my conversation with Dr. Gonzalez and the plan for discharge.  Patient remains asymptomatic.  Heart rate is in the 40s but the patient takes metoprolol and it seems like her  normal heart rate is in the 40s to 50s.  Patient was advised to call Dr. Valdez's office tomorrow morning.  Return precautions were discussed. [WH]   2147 Patient presented to the ED complaining of intermittent right-sided chest discomfort that radiates to the right neck.  Patient had episode earlier today that lasted for few hours.  Pain resolved prior to ED arrival.  Pain was somewhat atypical and was not related to exertion.  EKG was unchanged.  Troponin was negative x2.  Heart score was 5.  Patient had a cardiac cath done approximately 2 years ago which did not show any significant coronary disease.  Case was discussed with cardiology and the patient will be discharged to follow-up in their office. [WH]      ED Course User Index  [WH] Pio Sinclair MD       AS OF 22:37 EDT VITALS:    BP - 154/80  HR - (!) 42  TEMP - 97.5 °F (36.4 °C)  O2 SATS - 93%      DIAGNOSIS  Final diagnoses:   Atypical chest pain   Essential hypertension   Mixed hyperlipidemia  Sinus bradycardia         DISPOSITION  Discharge    DISCHARGE    Patient discharged in stable condition.    Reviewed implications of results, diagnosis, meds, responsibility to follow up, warning signs and symptoms of possible worsening, potential complications and reasons to return to ER, including worsening or persistent pain, nausea, vomiting, sweating, shortness of breath, or other concern..    Patient/Family voiced understanding of above instructions.    Discussed plan for discharge, as there is no emergent indication for admission. Patient referred to primary care provider for BP management due to today's BP. Pt/family is agreeable and understands need for follow up and repeat testing.  Pt is aware that discharge does not mean that nothing is wrong but it indicates no emergency is present that requires admission and they must continue care with follow-up as given below or physician of their choice.     FOLLOW-UP  [facility] CARDIOLOG[facility]  [id number]  [facility] [address]  [phone]  Call in 1 day  Chest pain         Medication List      No changes were made to your prescriptions during this visit.           Dictated utilizing Dragon dictation:  Much of this encounter note is an electronic transcription/translation of spoken language to printed text. The electronic translation of spoken language may permit erroneous, or at times, nonsensical words or phrases to be inadvertently transcribed; Although I have reviewed the note for such errors, some may still exist.     Pio Sinclair MD  04/18/22 4749

## 2022-04-18 NOTE — ED NOTES
Patient states this afternoon she has had right sided chest pain that radiates up to the right side of her neck.

## 2022-04-19 NOTE — DISCHARGE INSTRUCTIONS
Call Dr. Estrada's office tomorrow morning to schedule a follow-up appointment.  Return to the emergency department for worsening or persistent pain, shortness of breath, nausea, vomiting, sweating, or other concern.

## 2022-04-20 ENCOUNTER — PATIENT OUTREACH (OUTPATIENT)
Dept: CASE MANAGEMENT | Facility: OTHER | Age: 77
End: 2022-04-20

## 2022-04-20 NOTE — OUTREACH NOTE
AMBULATORY CASE MANAGEMENT NOTE    Name and Relationship of Patient/Support Person: Joi Aleman - Self    Adult Patient Profile  Questions/Answers    Flowsheet Row Most Recent Value   Symptoms/Conditions Managed at Home cardiovascular   Cardiovascular Symptoms/Conditions chest pain   Cardiovascular Management Strategies adequate rest, medication therapy, other (see comments)  [Patient following up [facility] cardiology 5/26/22. ]   Cardiovascular Comment Patient instructed to monitor chest pain. Take medication as prescribed. Monitor B/p Return for any unrelieved chest pain for further evaluation.       Patient Outreach    Introduced self, explained ACM RN role and provided contact information. Spoke with patient regarding health and wellness. Patient has follow up appointment on 5/26/22 with cardiology. Pain continues off and on. ACM encouraged patient to monitor closely and return for any unrelieved pain for further evaluation. Patient verbalized understanding. Follow up review scheduled in 1 month.     Education Documentation  No documentation found.        KIKE GALE  Ambulatory Case Management    4/20/2022, 16:15 EDT

## 2022-05-02 NOTE — TELEPHONE ENCOUNTER
Results Requested to [employer]  ,she has done this test in 2018 at [place]  but I can not find the results anywhere in her chart  ,she know im in the process  in waiting for them to send me the results .

## 2022-05-04 NOTE — PROGRESS NOTES
RM:________     PCP: Yaneth Leal MD    : 1945  AGE: 76 y.o.  EST PATIENT   REASON FOR VISIT/  CC:    BP Readings from Last 3 Encounters:   22 154/80   21 116/66   21 177/70        WT: ____________ BP: __________L __________R HR______    CHEST PAIN: _____________    SOA: _____________PALPS: _______________     LIGHTHEADED: ___________FATIGUE: ________________ EDEMA __________    ALLERGIES:Patient has no known allergies. SMOKING HISTORY:  Social History     Tobacco Use   • Smoking status: Former Smoker     Packs/day: 1.50     Years: 15.00     Pack years: 22.50     Types: Cigarettes     Start date:      Quit date:      Years since quittin.3   • Smokeless tobacco: Never Used   Vaping Use   • Vaping Use: Never used   Substance Use Topics   • Alcohol use: Yes     Comment: once month   • Drug use: Never     CAFFEINE USE_________________  ALCOHOL ______________________    Below is the patient's most recent value for Albumin, ALT, AST, BUN, Calcium, Chloride, Cholesterol, CO2, Creatinine, GFR, Glucose, HDL, Hematocrit, Hemoglobin, Hemoglobin A1C, LDL, Magnesium, Phosphorus, Platelets, Potassium, PSA, Sodium, Triglycerides, TSH and WBC.   Lab Results   Component Value Date    ALBUMIN 4.00 2022    ALT 9 2022    AST 14 2022    BUN 23 2022    CALCIUM 9.2 2022     2022    CO2 23.2 2022    CREATININE 1.07 (H) 2022    GLU 96 2018    HDL 83 2021    HCT 40.2 2022    HGB 13.5 2022    HGBA1C 5.6 2021     (H) 2021    MG 2.0 2020     2022    K 4.3 2022     2022    TRIG 110 2021    TSH 1.790 2018    WBC 7.06 2022          NEW DIAGNOSIS/ SURGERY/ HOSP OR ED VISITS: ______________________    __________________________________________________________________      RECENT LABS OR DIAGNOSTIC TESTING:   _____________________________    __________________________________________________________________      ASSESSMENT/ PLAN: _______________________________________________    __________________________________________________________________

## 2022-05-26 ENCOUNTER — HOSPITAL ENCOUNTER (OUTPATIENT)
Dept: CARDIOLOGY | Facility: HOSPITAL | Age: 77
Discharge: HOME | End: 2022-05-26
Admitting: INTERNAL MEDICINE

## 2022-05-26 ENCOUNTER — OFFICE VISIT (OUTPATIENT)
Dept: CARDIOLOGY | Facility: CLINIC | Age: 77
End: 2022-05-26

## 2022-05-26 VITALS
WEIGHT: 166.4 LBS | BODY MASS INDEX: 30.62 KG/M2 | HEIGHT: 62 IN | SYSTOLIC BLOOD PRESSURE: 124 MMHG | HEART RATE: 45 BPM | DIASTOLIC BLOOD PRESSURE: 76 MMHG

## 2022-05-26 VITALS
HEIGHT: 62 IN | BODY MASS INDEX: 30.36 KG/M2 | DIASTOLIC BLOOD PRESSURE: 80 MMHG | HEART RATE: 50 BPM | SYSTOLIC BLOOD PRESSURE: 160 MMHG | WEIGHT: 165 LBS

## 2022-05-26 DIAGNOSIS — I34.1 MITRAL VALVE PROLAPSE: ICD-10-CM

## 2022-05-26 DIAGNOSIS — Q24.5 CORONARY SINUS ABNORMALITY: ICD-10-CM

## 2022-05-26 DIAGNOSIS — I10 ESSENTIAL HYPERTENSION: ICD-10-CM

## 2022-05-26 DIAGNOSIS — R00.1 BRADYCARDIA, SINUS: ICD-10-CM

## 2022-05-26 DIAGNOSIS — R60.0 PEDAL EDEMA: Primary | ICD-10-CM

## 2022-05-26 DIAGNOSIS — R11.0 NAUSEA: ICD-10-CM

## 2022-05-26 DIAGNOSIS — Q24.5 MYOCARDIAL BRIDGE: ICD-10-CM

## 2022-05-26 LAB
ASCENDING AORTA: 2.9 CM
BH CV ECHO MEAS - ACS: 1.75 CM
BH CV ECHO MEAS - AI P1/2T: 757.5 MSEC
BH CV ECHO MEAS - AO MAX PG: 17.9 MMHG
BH CV ECHO MEAS - AO MEAN PG: 9.5 MMHG
BH CV ECHO MEAS - AO ROOT DIAM: 3 CM
BH CV ECHO MEAS - AO V2 MAX: 211.5 CM/SEC
BH CV ECHO MEAS - AO V2 VTI: 55.8 CM
BH CV ECHO MEAS - AVA(I,D): 2.09 CM2
BH CV ECHO MEAS - EDV(CUBED): 82 ML
BH CV ECHO MEAS - EDV(MOD-SP2): 62 ML
BH CV ECHO MEAS - EDV(MOD-SP4): 74 ML
BH CV ECHO MEAS - EF(MOD-BP): 63.2 %
BH CV ECHO MEAS - EF(MOD-SP2): 64.5 %
BH CV ECHO MEAS - EF(MOD-SP4): 62.2 %
BH CV ECHO MEAS - ESV(CUBED): 18.1 ML
BH CV ECHO MEAS - ESV(MOD-SP2): 22 ML
BH CV ECHO MEAS - ESV(MOD-SP4): 28 ML
BH CV ECHO MEAS - FS: 39.6 %
BH CV ECHO MEAS - IVS/LVPW: 1.16 CM
BH CV ECHO MEAS - IVSD: 1.02 CM
BH CV ECHO MEAS - LAT PEAK E' VEL: 8.6 CM/SEC
BH CV ECHO MEAS - LV DIASTOLIC VOL/BSA (35-75): 42 CM2
BH CV ECHO MEAS - LV MASS(C)D: 134.5 GRAMS
BH CV ECHO MEAS - LV MAX PG: 6.2 MMHG
BH CV ECHO MEAS - LV MEAN PG: 3.8 MMHG
BH CV ECHO MEAS - LV SYSTOLIC VOL/BSA (12-30): 15.9 CM2
BH CV ECHO MEAS - LV V1 MAX: 124 CM/SEC
BH CV ECHO MEAS - LV V1 VTI: 35.8 CM
BH CV ECHO MEAS - LVIDD: 4.3 CM
BH CV ECHO MEAS - LVIDS: 2.6 CM
BH CV ECHO MEAS - LVOT AREA: 3.2 CM2
BH CV ECHO MEAS - LVOT DIAM: 2.03 CM
BH CV ECHO MEAS - LVPWD: 0.88 CM
BH CV ECHO MEAS - MED PEAK E' VEL: 4.6 CM/SEC
BH CV ECHO MEAS - MR MAX PG: 112.6 MMHG
BH CV ECHO MEAS - MR MAX VEL: 530.6 CM/SEC
BH CV ECHO MEAS - MV A DUR: 0.14 SEC
BH CV ECHO MEAS - MV A MAX VEL: 118 CM/SEC
BH CV ECHO MEAS - MV DEC SLOPE: 532.6 CM/SEC2
BH CV ECHO MEAS - MV DEC TIME: 0.17 MSEC
BH CV ECHO MEAS - MV E MAX VEL: 91.5 CM/SEC
BH CV ECHO MEAS - MV E/A: 0.78
BH CV ECHO MEAS - MV MAX PG: 5 MMHG
BH CV ECHO MEAS - MV MEAN PG: 1.46 MMHG
BH CV ECHO MEAS - MV P1/2T: 56.1 MSEC
BH CV ECHO MEAS - MV V2 VTI: 43.8 CM
BH CV ECHO MEAS - MVA(P1/2T): 3.9 CM2
BH CV ECHO MEAS - MVA(VTI): 2.7 CM2
BH CV ECHO MEAS - PA ACC TIME: 0.09 SEC
BH CV ECHO MEAS - PA PR(ACCEL): 37.8 MMHG
BH CV ECHO MEAS - PA V2 MAX: 98.6 CM/SEC
BH CV ECHO MEAS - PULM A REVS DUR: 0.14 SEC
BH CV ECHO MEAS - PULM A REVS VEL: 61.7 CM/SEC
BH CV ECHO MEAS - PULM DIAS VEL: 49.9 CM/SEC
BH CV ECHO MEAS - PULM S/D: 1.2
BH CV ECHO MEAS - PULM SYS VEL: 59.6 CM/SEC
BH CV ECHO MEAS - QP/QS: 0.61
BH CV ECHO MEAS - RAP SYSTOLE: 3 MMHG
BH CV ECHO MEAS - RV MAX PG: 1.98 MMHG
BH CV ECHO MEAS - RV V1 MAX: 70.3 CM/SEC
BH CV ECHO MEAS - RV V1 VTI: 16.4 CM
BH CV ECHO MEAS - RVOT DIAM: 2.35 CM
BH CV ECHO MEAS - RVSP: 34.5 MMHG
BH CV ECHO MEAS - SI(MOD-SP2): 22.7 ML/M2
BH CV ECHO MEAS - SI(MOD-SP4): 26.1 ML/M2
BH CV ECHO MEAS - SV(LVOT): 116.3 ML
BH CV ECHO MEAS - SV(MOD-SP2): 40 ML
BH CV ECHO MEAS - SV(MOD-SP4): 46 ML
BH CV ECHO MEAS - SV(RVOT): 70.9 ML
BH CV ECHO MEAS - TAPSE (>1.6): 2.26 CM
BH CV ECHO MEAS - TR MAX PG: 31.5 MMHG
BH CV ECHO MEAS - TR MAX VEL: 280.7 CM/SEC
BH CV ECHO MEASUREMENTS AVERAGE E/E' RATIO: 13.86
BH CV XLRA - RV BASE: 3.9 CM
BH CV XLRA - RV LENGTH: 5.8 CM
BH CV XLRA - RV MID: 2.21 CM
BH CV XLRA - TDI S': 7.3 CM/SEC
LEFT ATRIUM VOLUME INDEX: 35.3 ML/M2
MAXIMAL PREDICTED HEART RATE: 146 BPM
SINUS: 3.2 CM
STJ: 2.7 CM
STRESS TARGET HR: 124 BPM

## 2022-05-26 PROCEDURE — 99214 OFFICE O/P EST MOD 30 MIN: CPT | Performed by: INTERNAL MEDICINE

## 2022-05-26 PROCEDURE — 93306 TTE W/DOPPLER COMPLETE: CPT | Performed by: INTERNAL MEDICINE

## 2022-05-26 PROCEDURE — 93306 TTE W/DOPPLER COMPLETE: CPT

## 2022-05-26 PROCEDURE — 93000 ELECTROCARDIOGRAM COMPLETE: CPT | Performed by: INTERNAL MEDICINE

## 2022-05-26 RX ORDER — AMLODIPINE BESYLATE 10 MG/1
5 TABLET ORAL DAILY
Qty: 90 TABLET | Refills: 1
Start: 2022-05-26 | End: 2022-12-14

## 2022-05-26 RX ORDER — HYDROCHLOROTHIAZIDE 12.5 MG/1
12.5 CAPSULE, GELATIN COATED ORAL DAILY
Qty: 90 CAPSULE | Refills: 3 | Status: SHIPPED | OUTPATIENT
Start: 2022-05-26

## 2022-05-26 NOTE — PROGRESS NOTES
Date of Office Visit: 22  Encounter Provider: Thomas Estrada MD  Place of Service: [facility] CARDIOLOGY  Patient Name: Joi Aleman  :1945    Chief Complaint   Patient presents with   • CORONARY SINUS ABNORMALITY    • Follow-up   :     HPI:     Ms. Aleman is 76 y.o. and presents today to follow up.  I have reviewed prior notes and there are no changes except for any new updates described below. I have also reviewed any information entered into the medical record by the patient or by ancillary staff.     She has a history of hypertension.  She has a history of a heart murmur and had an essentially normal echo at [facility] in 2016. She had a normal treadmill stress test in .  She had a normal perfusion stress at [facility] in .      She presented in May 2020 with 4-5 years of progressive generalized severe fatigue, generalized weakness, exertional dyspnea, and occasional atypical chest pain.  Her EKG was normal except for sinus bradycardia.  I noted a systolic murmur.  I checked an echo; it revealed normal LV systolic function, normal diastolic function for age, and possible MVP/MR.  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  It reported moderate TR with moderate PHTN.  However, when I sent her for a right heart cath, all of her pressures were normal.  There was no step up, and no evidence of constriction.  Her coronaries were essentially normal (there were some luminal irregularities, and very mild LAD bridging). I sent her for a cardiac CT.  No congenital abnormalities were seen (specifically, the pulmonary veins were normal, and no ASD was noted).  She did have mild RA/RV dilation, but normal function.  Her Agatston score was 29.  Her lungs looked normal.  Because of the mild LAD bridging, she was started on metoprolol tartrate.    She was in the ED last month with  atypical chest pain; her cardiac workup was unrevealing. She adjusted her diet and the pain resolved. She has not had any exertional angina. She does have some pedal edema that is worse at end of day. She denies lightheadedness or syncope. She denies palpitations. She feels nauseated when she bends over.     Past Medical History:   Diagnosis Date   • Agatston CAC score, <100     2020: 29   • Anxiety and depression    • Arthritis    • Brain aneurysm     WATCHING   • Frequent urination    • Hyperlipidemia    • Hypertension    • Insomnia    • Knee pain, bilateral    • Myocardial bridge     very mild, mid-LAD   • Osteoarthritis    • SOB (shortness of breath) on exertion        Past Surgical History:   Procedure Laterality Date   • BLEPHAROPLASTY     • CARDIAC CATHETERIZATION N/A 5/12/2020    Procedure: Coronary angiography;  Surgeon: Víctor Hernandez MD;  Location: [facility] CATH INVASIVE LOCATION;  Service: Cardiovascular;  Laterality: N/A;   • CARDIAC CATHETERIZATION N/A 5/12/2020    Procedure: Left heart cath;  Surgeon: Víctor Hernandez MD;  Location: [facility] CATH INVASIVE LOCATION;  Service: Cardiovascular;  Laterality: N/A;   • CARDIAC CATHETERIZATION N/A 5/12/2020    Procedure: Left ventriculography;  Surgeon: Víctor Hernandez MD;  Location: [facility] CATH INVASIVE LOCATION;  Service: Cardiovascular;  Laterality: N/A;   • CARDIAC CATHETERIZATION N/A 5/12/2020    Procedure: Right heart cath with shunt run;  Surgeon: Víctor Hernandez MD;  Location: [facility] CATH INVASIVE LOCATION;  Service: Cardiovascular;  Laterality: N/A;   • CARPAL TUNNEL RELEASE Right    • COLON SURGERY     • COLONOSCOPY      normal less than 10 years ago   • HYSTERECTOMY     • OOPHORECTOMY     • TOE SURGERY Right     right big toe replecemnet 12 years ago   • TONSILLECTOMY     • TOTAL KNEE ARTHROPLASTY Right 6/2/2021    Procedure: TOTAL KNEE ARTHROPLASTY, with left knee steroid injection;  Surgeon: Shamar Boone MD;  Location: [facility] OR;   Service: Orthopedics;  Laterality: Right;       Social History     Socioeconomic History   • Marital status: Single   • Number of children: 2   Tobacco Use   • Smoking status: Former Smoker     Packs/day: 1.50     Years: 15.00     Pack years: 22.50     Types: Cigarettes     Start date:      Quit date:      Years since quittin.4   • Smokeless tobacco: Never Used   Vaping Use   • Vaping Use: Never used   Substance and Sexual Activity   • Alcohol use: Yes     Comment: once month   • Drug use: Never   • Sexual activity: Defer     Family History   Problem Relation Age of Onset   • Breast cancer Mother 76   • Heart disease Father    • Heart attack Father    • Heart disease Paternal Grandfather    • Heart attack Paternal Grandfather    • No Known Problems Sister    • No Known Problems Brother    • Prostate cancer Maternal Grandfather    • Heart disease Sister    • Malig Hyperthermia Neg Hx      Review of Systems   Constitutional: Positive for malaise/fatigue.   HENT: Negative for ear pain and sore throat.    Cardiovascular: Positive for leg swelling. Negative for paroxysmal nocturnal dyspnea and syncope.   Respiratory: Negative for hemoptysis and sputum production.    Skin: Negative for rash.   Musculoskeletal: Negative for neck pain.   Gastrointestinal: Positive for nausea. Negative for abdominal pain and vomiting.   Neurological: Negative for headaches.   All other systems reviewed and are negative.    No Known Allergies      Current Outpatient Medications:   •  acetaminophen (TYLENOL) 325 MG tablet, Take 650 mg by mouth Every 6 (Six) Hours As Needed for Mild Pain ., Disp: , Rfl:   •  amLODIPine (NORVASC) 10 MG tablet, TAKE 1 TABLET EVERY DAY, Disp: 90 tablet, Rfl: 1  •  aspirin 81 MG EC tablet, Take 1 tablet twice daily x 14 days; then take 1 daily x 28 days (Patient taking differently: Take 1 tablet twice daily x 14 days; then take 1 daily x 28 days), Disp: 60 tablet, Rfl: 0  •  atorvastatin (LIPITOR) 20  "MG tablet, TAKE 1 TABLET EVERY DAY, Disp: 90 tablet, Rfl: 1  •  cephalexin (KEFLEX) 500 MG capsule, Take all 4 capsules one hour prior to procedure, Disp: 4 capsule, Rfl: 5  •  loperamide (IMODIUM) 2 MG capsule, TAKE 4 CAPSULES BY MOUTH DAILY., Disp: 360 capsule, Rfl: 1  •  magnesium oxide (MAG-OX) 400 tablet tablet, Take 400 mg by mouth Daily., Disp: , Rfl:   •  metoprolol tartrate (LOPRESSOR) 25 MG tablet, Take 1 tablet by mouth 2 (Two) Times a Day., Disp: 180 tablet, Rfl: 1  •  ondansetron (Zofran) 4 MG tablet, Take 1 tablet by mouth Every 8 (Eight) Hours As Needed for Nausea or Vomiting, Disp: 10 tablet, Rfl: 0  •  oxybutynin (DITROPAN) 5 MG tablet, Take 1 tablet by mouth 2 (Two) Times a Day. Urge urination, Disp: 180 tablet, Rfl: 2  •  sertraline (ZOLOFT) 100 MG tablet, TAKE 2 TABLETS EVERY DAY, Disp: 180 tablet, Rfl: 1  •  traZODone (DESYREL) 100 MG tablet, TAKE 1 TABLET EVERY NIGHT, Disp: 90 tablet, Rfl: 1      Objective:     Vitals:    05/26/22 1010   BP: 124/76   BP Location: Left arm   Pulse: (!) 45   Weight: 75.5 kg (166 lb 6.4 oz)   Height: 157.5 cm (62\")     Body mass index is 30.43 kg/m².    Physical Exam  Vitals reviewed.   Constitutional:       Appearance: She is well-developed.   HENT:      Head: Normocephalic.      Nose: Nose normal.      Mouth/Throat:      Comments: masked  Eyes:      Conjunctiva/sclera: Conjunctivae normal.   Neck:      Vascular: No JVD.   Cardiovascular:      Rate and Rhythm: Regular rhythm. Bradycardia present.      Pulses: Normal pulses and intact distal pulses.      Heart sounds: No murmur heard.  Pulmonary:      Effort: Pulmonary effort is normal.      Breath sounds: Normal breath sounds.   Abdominal:      Palpations: Abdomen is soft.      Tenderness: There is no abdominal tenderness.   Musculoskeletal:         General: Normal range of motion.      Cervical back: Normal range of motion.   Skin:     General: Skin is warm and dry.      Findings: No rash.   Neurological:      " General: No focal deficit present.      Mental Status: She is alert.      Cranial Nerves: No cranial nerve deficit.   Psychiatric:         Mood and Affect: Mood normal.         Behavior: Behavior normal.         Thought Content: Thought content normal.           ECG 12 Lead    Date/Time: 5/26/2022 10:43 AM  Performed by: Thomas Estrada MD  Authorized by: Thomas Estrada MD   Comparison: compared with previous ECG   Similar to previous ECG  Rhythm: sinus bradycardia  Rhythm comments: sinus pause/ectopic atrial escape beat  Conduction: conduction normal  ST Segments: ST segments normal  T Waves: T waves normal  QRS axis: normal  Other: no other findings    Clinical impression: non-specific ECG            Assessment:       Diagnosis Plan   1. Pedal edema     2. Bradycardia, sinus     3. Nausea     4. Essential hypertension     5. Myocardial bridge     6. Mitral valve prolapse     7. Coronary sinus abnormality          Plan:       Ms Aleman has had an extensive workup.  And while we've found some minor abnormalities, we haven't found anything that would cause profound fatigue or dyspnea.    She has normal LV systolic function, and normal filling pressures.  She has a known history of MVP; today, it was mild, and her MR was mild. She has a mild LAD bridge and no significant CAD.  Her RA/RV were reported to be mildly dilated in the past, but I felt they looked normal today. She does not have PHTN or a shunt.  She has a dilated coronary sinus, but this is a normal variant in her, as a cardiac CTA showed no congenital abnormalities.      She was started on metoprolol in case the bridge caused her chest pain.  However, her heart rate is fairly low, and she had a sinus pause on EKG today.  I am cutting metoprolol in half to 12.5 mg twice daily.  She has pedal edema which is likely being worsened by amlodipine.  I am also cutting it in half to 5 mg daily, and adding HCTZ 12.5 mg daily.  I will have her come back in 4 weeks for  evaluation.    She is on aspirin and atorvastatin for her luminal irregularities.    I'll repeat an echo in two years.     Sincerely,       Thomas Estrada MD                Answers for HPI/ROS submitted by the patient on 4/23/2020   Shortness of breath  What is the primary reason for your visit?: Shortness of Breath  Chronicity: chronic  Onset: more than 1 year ago  Frequency: every few minutes  Progression since onset: gradually worsening  Episode duration: 10 minutes  coryza: No  Aggravating factors: any activity

## 2022-05-31 ENCOUNTER — OFFICE VISIT (OUTPATIENT)
Dept: ORTHOPEDIC SURGERY | Facility: CLINIC | Age: 77
End: 2022-05-31

## 2022-05-31 VITALS — TEMPERATURE: 98.7 F | BODY MASS INDEX: 30.59 KG/M2 | HEIGHT: 62 IN | WEIGHT: 166.2 LBS

## 2022-05-31 DIAGNOSIS — R52 PAIN: Primary | ICD-10-CM

## 2022-05-31 PROCEDURE — 99212 OFFICE O/P EST SF 10 MIN: CPT | Performed by: ORTHOPAEDIC SURGERY

## 2022-05-31 PROCEDURE — 73562 X-RAY EXAM OF KNEE 3: CPT | Performed by: ORTHOPAEDIC SURGERY

## 2022-05-31 NOTE — PROGRESS NOTES
"Joi Aleman : 1945 MRN: [id number] DATE: 2022    DIAGNOSIS: Annual follow up right total knee      SUBJECTIVE:Patient returns today for  1 year follow up of right total knee replacement. Patient reports doing well with no unusual complaints. Denies any limitations due to the knee.    OBJECTIVE:    Temp 98.7 °F (37.1 °C)   Ht 157.5 cm (62\")   Wt 75.4 kg (166 lb 3.2 oz)   BMI 30.40 kg/m²   Family History   Problem Relation Age of Onset   • Breast cancer Mother 76   • Heart disease Father    • Heart attack Father    • Heart disease Paternal Grandfather    • Heart attack Paternal Grandfather    • No Known Problems Sister    • No Known Problems Brother    • Prostate cancer Maternal Grandfather    • Heart disease Sister    • Malig Hyperthermia Neg Hx      Past Medical History:   Diagnosis Date   • Agatston CAC score, <100     2020: 29   • Anxiety and depression    • Arthritis    • Brain aneurysm     WATCHING   • Frequent urination    • Hyperlipidemia    • Hypertension    • Insomnia    • Knee pain, bilateral    • Myocardial bridge     very mild, mid-LAD   • Osteoarthritis    • SOB (shortness of breath) on exertion      Past Surgical History:   Procedure Laterality Date   • BLEPHAROPLASTY     • CARDIAC CATHETERIZATION N/A 2020    Procedure: Coronary angiography;  Surgeon: Víctor Hernandez MD;  Location: [facility] INVASIVE LOCATION;  Service: Cardiovascular;  Laterality: N/A;   • CARDIAC CATHETERIZATION N/A 2020    Procedure: Left heart cath;  Surgeon: Víctor Hernandez MD;  Location: [facility] CATH INVASIVE LOCATION;  Service: Cardiovascular;  Laterality: N/A;   • CARDIAC CATHETERIZATION N/A 2020    Procedure: Left ventriculography;  Surgeon: Víctor Hernandez MD;  Location: [facility] INVASIVE LOCATION;  Service: Cardiovascular;  Laterality: N/A;   • CARDIAC CATHETERIZATION N/A 2020    Procedure: Right heart cath with shunt run;  Surgeon: Víctor Hernandez MD;  Location: [facility]" CATH INVASIVE LOCATION;  Service: Cardiovascular;  Laterality: N/A;   • CARPAL TUNNEL RELEASE Right    • COLON SURGERY     • COLONOSCOPY      normal less than 10 years ago   • HYSTERECTOMY     • OOPHORECTOMY     • TOE SURGERY Right     right big toe replecemnet 12 years ago   • TONSILLECTOMY     • TOTAL KNEE ARTHROPLASTY Right 2021    Procedure: TOTAL KNEE ARTHROPLASTY, with left knee steroid injection;  Surgeon: Shamar Boone MD;  Location: [facility] OR;  Service: Orthopedics;  Laterality: Right;     Social History     Socioeconomic History   • Marital status: Single   • Number of children: 2   Tobacco Use   • Smoking status: Former Smoker     Packs/day: 1.50     Years: 15.00     Pack years: 22.50     Types: Cigarettes     Start date:      Quit date:      Years since quittin.4   • Smokeless tobacco: Never Used   Vaping Use   • Vaping Use: Never used   Substance and Sexual Activity   • Alcohol use: Yes     Comment: once month   • Drug use: Never   • Sexual activity: Defer     Review of Systems: 14 point review of systems performed, all systems negative     Exam:. The incision is well healed. Range of motion is measured at 1 to 120. The calf is soft and nontender with a negative Homans sign. Alignment is neutral. Good quad strength. There is no evidence of varus/valgus or flexion instability. No effusion. Intact to light touch with palpable distal pulses.     DIAGNOSTIC STUDIES  Xrays: 3 views(AP bilateral knees, lateral right, and sunrise bilateral knees) were ordered and reviewed for evaluation of right knee replacement. They demonstrate a well positioned, well aligned knee replacement without complicating factors noted. In comparison with previous films there has been no change.    ASSESSMENT: Annual follow up right knee replacement.    PLAN:  Continue activities as tolerated    Follow upprn  Shamar Boone MD  2022

## 2022-06-23 DIAGNOSIS — F51.01 PRIMARY INSOMNIA: ICD-10-CM

## 2022-06-24 RX ORDER — TRAZODONE HYDROCHLORIDE 100 MG/1
TABLET ORAL
Qty: 90 TABLET | Refills: 1 | Status: SHIPPED | OUTPATIENT
Start: 2022-06-24 | End: 2023-02-10

## 2022-07-07 ENCOUNTER — OFFICE VISIT (OUTPATIENT)
Dept: FAMILY MEDICINE CLINIC | Facility: CLINIC | Age: 77
End: 2022-07-07

## 2022-07-07 VITALS
BODY MASS INDEX: 30.18 KG/M2 | HEART RATE: 50 BPM | SYSTOLIC BLOOD PRESSURE: 114 MMHG | WEIGHT: 164 LBS | HEIGHT: 62 IN | TEMPERATURE: 97.9 F | DIASTOLIC BLOOD PRESSURE: 62 MMHG

## 2022-07-07 DIAGNOSIS — I10 ESSENTIAL HYPERTENSION: Primary | ICD-10-CM

## 2022-07-07 DIAGNOSIS — L91.8 SKIN TAG: ICD-10-CM

## 2022-07-07 DIAGNOSIS — N39.3 STRESS INCONTINENCE: ICD-10-CM

## 2022-07-07 DIAGNOSIS — F33.1 MODERATE EPISODE OF RECURRENT MAJOR DEPRESSIVE DISORDER: ICD-10-CM

## 2022-07-07 PROCEDURE — 99214 OFFICE O/P EST MOD 30 MIN: CPT | Performed by: FAMILY MEDICINE

## 2022-07-07 NOTE — PROGRESS NOTES
"Chief Complaint  Hypertension (Follow up no complains doing well  ,no chest pain or palpitations ), Eye Problem (She want to check with dr araujo if she sees any abnormalities in right eye she feel pressure sometimes ), and Skin Problem (Pt want to check if dr araujo can take off skin tag on her right arm )    Subjective        Joi Aleman presents to [facility] PR[facility]  History of Present Illness  Pleasant 76-year-old female here for hypertension which is well controlled, follow-up incontinence, started oxybutynin 5 mg at the last appointment, it does seem to help -she does get xerostomia with this but it does help her symptoms enough to take it 4-5 times a week.    Skin tag on her right arm and right breast not bleeding but it is bothersome.  She would like to have it removed.  We discussed that it is not premalignant.    Objective   Vital Signs:  /62   Pulse 50   Temp 97.9 °F (36.6 °C)   Ht 157.5 cm (62\")   Wt 74.4 kg (164 lb)   BMI 30.00 kg/m²   Estimated body mass index is 30 kg/m² as calculated from the following:    Height as of this encounter: 157.5 cm (62\").    Weight as of this encounter: 74.4 kg (164 lb).          Physical Exam  Vitals and nursing note reviewed.   Constitutional:       General: She is not in acute distress.     Appearance: She is well-developed.   HENT:      Head: Normocephalic.      Nose: Nose normal.   Cardiovascular:      Rate and Rhythm: Normal rate and regular rhythm.      Heart sounds: Normal heart sounds. No murmur heard.  Pulmonary:      Effort: Pulmonary effort is normal. No respiratory distress.      Breath sounds: Normal breath sounds.   Musculoskeletal:         General: Normal range of motion.   Skin:     General: Skin is warm and dry.      Findings: No rash.   Neurological:      Mental Status: She is alert and oriented to person, place, and time.   Psychiatric:         Behavior: Behavior normal.         Thought Content: Thought content " normal.         Judgment: Judgment normal.        Result Review :                Assessment and Plan   Diagnoses and all orders for this visit:    1. Essential hypertension (Primary)  -     Comprehensive Metabolic Panel  -     CBC & Differential  -     TSH Rfx On Abnormal To Free T4    2. Moderate episode of recurrent major depressive disorder (HCC)    3. Skin tag    4. Stress incontinence    Hypertension well controlled, she has been seen by cardiology, agree with current meds.  Slight elevation in creatinine with last check in April when she had atypical chest pain likely due to GERD.  We will recheck levels as above.    Depression stable, some chronic dysthymia.  We discussed getting outside would be more helpful, going for a walk 20 minutes or spending some time outside.    Skin tag on the right arm, not precancerous, although it is bothersome.  I am happy to remove it but she is aware that this will not likely be covered.  I would recommend that we schedule a time in the fall when she will be able to cover the area more easily.    Stress incontinence improved with oxybutynin 5 mg twice daily as needed.  Okay to continue as needed.         Follow Up   Return in about 3 months (around 10/7/2022), or if symptoms worsen or fail to improve, for Recheck skin tag removal .  Patient was given instructions and counseling regarding her condition or for health maintenance advice. Please see specific information pulled into the AVS if appropriate. Medical assistant and I wore mask and eyewear protection during entire encounter.  Patient wore mask.    Yaneth Leal MD

## 2022-07-08 LAB
ALBUMIN SERPL-MCNC: 4.2 G/DL (ref 3.7–4.7)
ALBUMIN/GLOB SERPL: 2.1 {RATIO} (ref 1.2–2.2)
ALP SERPL-CCNC: 94 IU/L (ref 44–121)
ALT SERPL-CCNC: 11 IU/L (ref 0–32)
AST SERPL-CCNC: 19 IU/L (ref 0–40)
BASOPHILS # BLD AUTO: 0.1 X10E3/UL (ref 0–0.2)
BASOPHILS NFR BLD AUTO: 1 %
BILIRUB SERPL-MCNC: 0.4 MG/DL (ref 0–1.2)
BUN SERPL-MCNC: 21 MG/DL (ref 8–27)
BUN/CREAT SERPL: 21 (ref 12–28)
CALCIUM SERPL-MCNC: 9.5 MG/DL (ref 8.7–10.3)
CHLORIDE SERPL-SCNC: 92 MMOL/L (ref 96–106)
CO2 SERPL-SCNC: 26 MMOL/L (ref 20–29)
CREAT SERPL-MCNC: 0.99 MG/DL (ref 0.57–1)
EGFRCR SERPLBLD CKD-EPI 2021: 60 ML/MIN/1.73
EOSINOPHIL # BLD AUTO: 0.2 X10E3/UL (ref 0–0.4)
EOSINOPHIL NFR BLD AUTO: 2 %
ERYTHROCYTE [DISTWIDTH] IN BLOOD BY AUTOMATED COUNT: 12.8 % (ref 11.7–15.4)
GLOBULIN SER CALC-MCNC: 2 G/DL (ref 1.5–4.5)
GLUCOSE SERPL-MCNC: 89 MG/DL (ref 65–99)
HCT VFR BLD AUTO: 39.9 % (ref 34–46.6)
HGB BLD-MCNC: 13.4 G/DL (ref 11.1–15.9)
IMM GRANULOCYTES # BLD AUTO: 0 X10E3/UL (ref 0–0.1)
IMM GRANULOCYTES NFR BLD AUTO: 1 %
LYMPHOCYTES # BLD AUTO: 1.8 X10E3/UL (ref 0.7–3.1)
LYMPHOCYTES NFR BLD AUTO: 21 %
MCH RBC QN AUTO: 30.2 PG (ref 26.6–33)
MCHC RBC AUTO-ENTMCNC: 33.6 G/DL (ref 31.5–35.7)
MCV RBC AUTO: 90 FL (ref 79–97)
MONOCYTES # BLD AUTO: 0.8 X10E3/UL (ref 0.1–0.9)
MONOCYTES NFR BLD AUTO: 10 %
NEUTROPHILS # BLD AUTO: 5.6 X10E3/UL (ref 1.4–7)
NEUTROPHILS NFR BLD AUTO: 65 %
PLATELET # BLD AUTO: 280 X10E3/UL (ref 150–450)
POTASSIUM SERPL-SCNC: 4.6 MMOL/L (ref 3.5–5.2)
PROT SERPL-MCNC: 6.2 G/DL (ref 6–8.5)
RBC # BLD AUTO: 4.44 X10E6/UL (ref 3.77–5.28)
SODIUM SERPL-SCNC: 133 MMOL/L (ref 134–144)
TSH SERPL DL<=0.005 MIU/L-ACNC: 1.28 UIU/ML (ref 0.45–4.5)
WBC # BLD AUTO: 8.6 X10E3/UL (ref 3.4–10.8)

## 2022-07-25 NOTE — PROGRESS NOTES
Please call patient back with results.  The labs has resulted as normal kidney function and liver function normal blood counts, normal thyroid function.  The salts in your blood are slightly lower.  I do think while the water hydration has helped your kidney function its probably attributed to the lower electrolytes.  You probably need to add more electrolytes to your fluids either through your diet or Gatorade.  Please let us know if you have further questions.     Thank you

## 2022-08-18 DIAGNOSIS — K58.0 IRRITABLE BOWEL SYNDROME WITH DIARRHEA: ICD-10-CM

## 2022-08-18 RX ORDER — LOPERAMIDE HYDROCHLORIDE 2 MG/1
8 CAPSULE ORAL DAILY
Qty: 360 CAPSULE | Refills: 1 | Status: SHIPPED | OUTPATIENT
Start: 2022-08-18

## 2022-08-18 NOTE — TELEPHONE ENCOUNTER
Rx Refill Note  Requested Prescriptions     Pending Prescriptions Disp Refills   • loperamide (IMODIUM) 2 MG capsule [Pharmacy Med Name: LOPERAMIDE HCL 2 MG Capsule] 360 capsule 1     Sig: TAKE 4 CAPSULES BY MOUTH DAILY.      Last office visit with prescribing clinician: 7/7/2022      Next office visit with prescribing clinician: 1/10/2023            Maida Rodriguez MA/HELGA  08/18/22, 16:02 EDT

## 2022-08-27 DIAGNOSIS — N39.41 URGE INCONTINENCE: ICD-10-CM

## 2022-08-29 RX ORDER — OXYBUTYNIN CHLORIDE 5 MG/1
5 TABLET ORAL 2 TIMES DAILY
Qty: 180 TABLET | Refills: 2 | Status: SHIPPED | OUTPATIENT
Start: 2022-08-29

## 2022-09-10 ENCOUNTER — HOSPITAL ENCOUNTER (EMERGENCY)
Facility: HOSPITAL | Age: 77
Discharge: HOME | End: 2022-09-10
Attending: EMERGENCY MEDICINE | Admitting: EMERGENCY MEDICINE

## 2022-09-10 ENCOUNTER — APPOINTMENT (OUTPATIENT)
Dept: CT IMAGING | Facility: HOSPITAL | Age: 77
End: 2022-09-10

## 2022-09-10 VITALS
HEIGHT: 62 IN | WEIGHT: 165.34 LBS | TEMPERATURE: 98 F | HEART RATE: 76 BPM | RESPIRATION RATE: 15 BRPM | OXYGEN SATURATION: 98 % | SYSTOLIC BLOOD PRESSURE: 131 MMHG | DIASTOLIC BLOOD PRESSURE: 70 MMHG | BODY MASS INDEX: 30.43 KG/M2

## 2022-09-10 DIAGNOSIS — S62.91XA: ICD-10-CM

## 2022-09-10 DIAGNOSIS — S00.83XA CONTUSION OF FOREHEAD, INITIAL ENCOUNTER: Primary | ICD-10-CM

## 2022-09-10 PROCEDURE — 70450 CT HEAD/BRAIN W/O DYE: CPT

## 2022-09-10 PROCEDURE — 99283 EMERGENCY DEPT VISIT LOW MDM: CPT

## 2022-09-10 NOTE — ED NOTES
Pt arrived PV. Came from [facility] because she fell yesterday. [facility] did X ray and sent her here to get a CT scan. Pt hit her head. Denies LOC. Not on blood thinners.     RN wearing appropriate PPE while in triage, patient in mask.

## 2022-09-10 NOTE — ED PROVIDER NOTES
EMERGENCY DEPARTMENT ENCOUNTER    Room Number:  34/34  PCP: Yaneth Leal MD  Historian: Patient  History Limited By: Nothing      HPI  Chief Complaint: Fall  Context: Joi Aleman is a 77 y.o. female who presents to the ED c/o fall.  Patient states she lost her balance and fell yesterday patient injured her head and her right hand..  Patient was seen at urgent care and had x-rays of her hand that showed 2 different hand fractures.  Patient sent here for evaluation of head injury.  Patient states she did not lose consciousness.  Patient is not on blood thinners patient has no neck pain.  No chest pain.  No abdominal pain.  No pain in her shoulders or her arms.  Patient is right-hand dominant.      Location: Right face  Radiation: None  Character: Aching  Duration: Yesterday  Severity: Moderate  Progression: Improving  Aggravating Factors: Nothing  Alleviating Factors: Nothing        MEDICAL RECORD REVIEW    Patient seen at urgent care and has fractures of her third and fifth metacarpal.  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   • Mitral valve prolapse 08/18/2021   • Agatston CAC score, <100    • Stress incontinence 07/07/2022   • Skin tag 07/07/2022     Resolved Ambulatory Problems     Diagnosis Date Noted   • Brain aneurysm 02/03/2020     Past Medical History:   Diagnosis Date   • Anxiety and depression    • Frequent urination    • Hyperlipidemia    • Hypertension    • Insomnia    • Knee pain, bilateral    •  Osteoarthritis    • SOB (shortness of breath) on exertion          PAST SURGICAL HISTORY  Past Surgical History:   Procedure Laterality Date   • BLEPHAROPLASTY     • CARDIAC CATHETERIZATION N/A 5/12/2020    Procedure: Coronary angiography;  Surgeon: Víctor Hernandez MD;  Location:  [facility] CATH INVASIVE LOCATION;  Service: Cardiovascular;  Laterality: N/A;   • CARDIAC CATHETERIZATION N/A 5/12/2020    Procedure: Left heart cath;  Surgeon: Víctor Hernandez MD;  [facility];  Service: Cardiovascular;  Laterality: N/A;   • CARDIAC CATHETERIZATION N/A 5/12/2020    Procedure: Left ventriculography;  Surgeon: Víctor Hernandez MD;  Location:  [facility] CATH INVASIVE LOCATION;  Service: Cardiovascular;  Laterality: N/A;   • CARDIAC CATHETERIZATION N/A 5/12/2020    Procedure: Right heart cath with shunt run;  Surgeon: Víctor Hernandez MD;  Location:  [facility] CATH INVASIVE LOCATION;  Service: Cardiovascular;  Laterality: N/A;   • CARPAL TUNNEL RELEASE Right    • COLON SURGERY     • COLONOSCOPY      normal less than 10 years ago   • HYSTERECTOMY     • OOPHORECTOMY     • TOE SURGERY Right     right big toe replecemnet 12 years ago   • TONSILLECTOMY     • TOTAL KNEE ARTHROPLASTY Right 6/2/2021    Procedure: TOTAL KNEE ARTHROPLASTY, with left knee steroid injection;  Surgeon: Shamar Boone MD;  Location: [facility];  Service: Orthopedics;  Laterality: Right;         FAMILY HISTORY  Family History   Problem Relation Age of Onset   • Breast cancer Mother 76   • Heart disease Father    • Heart attack Father    • Heart disease Paternal Grandfather    • Heart attack Paternal Grandfather    • No Known Problems Sister    • No Known Problems Brother    • Prostate cancer Maternal Grandfather    • Heart disease Sister    • Malig Hyperthermia Neg Hx          SOCIAL HISTORY  Social History     Socioeconomic History   • Marital status: Single   • Number of children: 2   Tobacco Use   • Smoking status: Former  Smoker     Packs/day: 1.50     Years: 15.00     Pack years: 22.50     Types: Cigarettes     Start date:      Quit date:      Years since quittin.7   • Smokeless tobacco: Never Used   Vaping Use   • Vaping Use: Never used   Substance and Sexual Activity   • Alcohol use: Yes     Comment: once month   • Drug use: Never   • Sexual activity: Defer         ALLERGIES  Patient has no known allergies.        REVIEW OF SYSTEMS  Review of Systems   Constitutional: Negative for fever.   HENT: Negative for sore throat.    Eyes: Negative.    Respiratory: Negative for cough and shortness of breath.    Cardiovascular: Negative for chest pain.   Gastrointestinal: Negative for abdominal pain, diarrhea and vomiting.   Genitourinary: Negative for dysuria.   Musculoskeletal: Positive for joint swelling. Negative for neck pain.   Skin: Positive for wound. Negative for rash.   Allergic/Immunologic: Negative.    Neurological: Negative for weakness, numbness and headaches.   Hematological: Negative.    Psychiatric/Behavioral: Negative.    All other systems reviewed and are negative.           PHYSICAL EXAM  ED Triage Vitals [09/10/22 1201]   Temp Heart Rate Resp BP SpO2   98.7 °F (37.1 °C) 95 16 -- 92 %      Temp src Heart Rate Source Patient Position BP Location FiO2 (%)   Tympanic -- -- -- --       Physical Exam  Vitals and nursing note reviewed.   Constitutional:       General: She is not in acute distress.  HENT:      Head:      Comments: Hematoma to right forehead, periorbital ecchymosis.  No periorbital tenderness  Eyes:      Pupils: Pupils are equal, round, and reactive to light.   Cardiovascular:      Rate and Rhythm: Normal rate and regular rhythm.      Heart sounds: Normal heart sounds.   Pulmonary:      Effort: Pulmonary effort is normal. No respiratory distress.      Breath sounds: Normal breath sounds.   Abdominal:      Palpations: Abdomen is soft.      Tenderness: There is no abdominal tenderness. There is no  guarding or rebound.   Musculoskeletal:         General: Normal range of motion.      Cervical back: Normal range of motion and neck supple.      Comments: Right hand in fiberglass splint   Skin:     General: Skin is warm and dry.      Findings: No rash.   Neurological:      Mental Status: She is alert and oriented to person, place, and time.      Sensory: Sensation is intact. No sensory deficit.      Motor: No weakness.   Psychiatric:         Mood and Affect: Mood and affect normal.       Patient was wearing a face mask when I entered the room and they continued to wear a mask throughout their stay in the ED.  I wore PPE, including  gloves, face mask with shield or face mask with goggles whenever I was in the room with patient.       LAB RESULTS  No results found for this or any previous visit (from the past 24 hour(s)).    Ordered the above labs and reviewed the results.        RADIOLOGY  CT Head Without Contrast   Final Result       There is no evidence for acute traumatic intracranial pathology.       Incidental mild changes of chronic small vessel ischemic phenomena are   identified.       There is a right frontal convexity scalp hematoma extending into the   right preseptal soft tissues.           Radiation dose reduction techniques were utilized, including automated   exposure control and exposure modulation based on body size.       This report was finalized on 9/10/2022 1:12 PM by Dr. Willie Parker M.D.               Ordered the above noted radiological studies. Reviewed by me in PACS.        PROCEDURES  Procedures            MEDICATIONS GIVEN IN ER  Medications - No data to display          PROGRESS AND CONSULTS  ED Course as of 09/10/22 1453   Sat Sep 10, 2022   1343 13:43 EDT  Patient here for fall.  Was seen before this and has known hand fractures that have been splinted.  Unclear if she has been given follow-up but we will give her both Dr. Davis and Tc's number here.  Patient sent here for  evaluation of her head injury and CT scan negative for bleeding or fracture patient's bruising most likely from her hematoma on her forehead.  Has no periorbital tenderness.  Will discharge home. [SL]      ED Course User Index  [SL] Berlin Miranda MD           MEDICAL DECISION MAKING      MDM  Number of Diagnoses or Management Options     Amount and/or Complexity of Data Reviewed  Tests in the radiology section of CPT®: reviewed and ordered (Head CT negative)               DIAGNOSIS  Final diagnoses:   Contusion of forehead, initial encounter   Closed fracture of multiple bones of right hand, initial encounter           DISPOSITION  DISCHARGE    Patient discharged in stable condition.    Reviewed implications of results, diagnosis, meds, responsibility to follow up, warning signs and symptoms of possible worsening, potential complications and reasons to return to ER, including worsening symptoms.    Patient/Family voiced understanding of above instructions.    Discussed plan for discharge, as there is no emergent indication for admission. Patient referred to primary care provider for BP management due to today's BP. Pt/family is agreeable and understands need for follow up and repeat testing.  Pt is aware that discharge does not mean that nothing is wrong but it indicates no emergency is present that requires admission and they must continue care with follow-up as given below or physician of their choice.     FOLLOW-UP  Luther Montez MD  [address], [facility] [address]  [place] [address]  [phone]    Schedule an appointment as soon as possible for a visit       Lazaro Davis MD  [address]  [place] [address]  [phone]    Schedule an appointment as soon as possible for a visit            Medication List      Changed    metoprolol tartrate 25 MG tablet  Commonly known as: LOPRESSOR  Take 0.5 tablets by mouth 2 (Two) Times a Day.  What changed: when to take this                Latest  Documented Vital Signs:  As of 14:53 EDT  BP- 131/70 HR- 76 Temp- 98 °F (36.7 °C) (Oral) O2 sat- 98%                       Berlin Miranda MD  09/10/22 [id number]

## 2022-09-19 ENCOUNTER — PATIENT OUTREACH (OUTPATIENT)
Dept: CASE MANAGEMENT | Facility: OTHER | Age: 77
End: 2022-09-19

## 2022-09-19 NOTE — OUTREACH NOTE
AMBULATORY CASE MANAGEMENT NOTE    Name and Relationship of Patient/Support Person: Joi Aleman - Self    Adult Patient Profile  Questions/Answers    Flowsheet Row Most Recent Value   Symptoms/Conditions Managed at Home musculoskeletal   Barriers to Managing Health none   Musculoskeletal Symptoms/Conditions fracture   Musculoskeletal Management Strategies other (see comments)  [Patient is following up with Jayson for hand fracture. ]   Musculoskeletal Self-Management Outcome 4 (good)   Musculoskeletal Comment No needs at this time. Patient states she has help and support at home.       Patient Outreach    Introduced self, explained ACM RN role and provided contact information. Spoke with patient regarding health and wellness. Patient states she has support at home. She is following up with Jayson and has no needs at this time. SDOH survey sent via [employer]. Follow up review scheduled in 1 month.     Education Documentation  No documentation found.        KIKE GALE  Ambulatory Case Management    9/19/2022, 16:28 EDT

## 2022-10-17 ENCOUNTER — TELEPHONE (OUTPATIENT)
Dept: ORTHOPEDIC SURGERY | Facility: CLINIC | Age: 77
End: 2022-10-17

## 2022-10-17 DIAGNOSIS — Z96.651 S/P TKR (TOTAL KNEE REPLACEMENT), RIGHT: Primary | ICD-10-CM

## 2022-10-17 RX ORDER — CEPHALEXIN 500 MG/1
CAPSULE ORAL
Qty: 4 CAPSULE | Refills: 5 | Status: SHIPPED | OUTPATIENT
Start: 2022-10-17 | End: 2023-05-31 | Stop reason: ALTCHOICE

## 2022-10-17 NOTE — TELEPHONE ENCOUNTER
Caller: Joi Aleman    Relationship: Self    Best call back number: [phone]    Requested Prescriptions:   Requested Prescriptions     Pending Prescriptions Disp Refills   • cephalexin (KEFLEX) 500 MG capsule 4 capsule 5     Sig: Take all 4 capsules one hour prior to procedure        Pharmacy where request should be sent:  [facility] ON FILE IS CORRECT    Additional details provided by patient: DENTAL APPT IS 10.25.22    Does the patient have less than a 3 day supply:  [x] Yes  [x] No    Benedicto Payton Rep   10/17/22 11:06 EDT

## 2022-11-21 DIAGNOSIS — E78.2 MIXED HYPERLIPIDEMIA: ICD-10-CM

## 2022-11-21 DIAGNOSIS — F33.1 MODERATE EPISODE OF RECURRENT MAJOR DEPRESSIVE DISORDER: ICD-10-CM

## 2022-11-21 RX ORDER — ATORVASTATIN CALCIUM 20 MG/1
TABLET, FILM COATED ORAL
Qty: 90 TABLET | Refills: 1 | Status: SHIPPED | OUTPATIENT
Start: 2022-11-21

## 2022-11-21 RX ORDER — SERTRALINE HYDROCHLORIDE 100 MG/1
TABLET, FILM COATED ORAL
Qty: 180 TABLET | Refills: 1 | Status: SHIPPED | OUTPATIENT
Start: 2022-11-21

## 2022-12-14 DIAGNOSIS — I10 ESSENTIAL HYPERTENSION: ICD-10-CM

## 2022-12-14 RX ORDER — AMLODIPINE BESYLATE 10 MG/1
TABLET ORAL
Qty: 90 TABLET | Refills: 1 | Status: SHIPPED | OUTPATIENT
Start: 2022-12-14

## 2023-01-10 ENCOUNTER — OFFICE VISIT (OUTPATIENT)
Dept: FAMILY MEDICINE CLINIC | Facility: CLINIC | Age: 78
End: 2023-01-10
Payer: MEDICARE

## 2023-01-10 VITALS
DIASTOLIC BLOOD PRESSURE: 72 MMHG | OXYGEN SATURATION: 97 % | HEIGHT: 62 IN | SYSTOLIC BLOOD PRESSURE: 126 MMHG | TEMPERATURE: 97.9 F | WEIGHT: 166 LBS | HEART RATE: 54 BPM | BODY MASS INDEX: 30.55 KG/M2

## 2023-01-10 DIAGNOSIS — Z12.11 SCREEN FOR COLON CANCER: ICD-10-CM

## 2023-01-10 DIAGNOSIS — R29.898 WEAKNESS OF BOTH LEGS: ICD-10-CM

## 2023-01-10 DIAGNOSIS — Z12.31 ENCOUNTER FOR SCREENING MAMMOGRAM FOR MALIGNANT NEOPLASM OF BREAST: ICD-10-CM

## 2023-01-10 DIAGNOSIS — E78.2 MIXED HYPERLIPIDEMIA: ICD-10-CM

## 2023-01-10 DIAGNOSIS — I10 ESSENTIAL HYPERTENSION: ICD-10-CM

## 2023-01-10 DIAGNOSIS — F33.1 MODERATE EPISODE OF RECURRENT MAJOR DEPRESSIVE DISORDER: ICD-10-CM

## 2023-01-10 DIAGNOSIS — E55.9 AVITAMINOSIS D: ICD-10-CM

## 2023-01-10 DIAGNOSIS — Z00.00 MEDICARE ANNUAL WELLNESS VISIT, SUBSEQUENT: Primary | ICD-10-CM

## 2023-01-10 DIAGNOSIS — N39.41 URGE INCONTINENCE: ICD-10-CM

## 2023-01-10 DIAGNOSIS — Z78.0 POSTMENOPAUSAL: ICD-10-CM

## 2023-01-10 PROCEDURE — 1126F AMNT PAIN NOTED NONE PRSNT: CPT | Performed by: FAMILY MEDICINE

## 2023-01-10 PROCEDURE — 96160 PT-FOCUSED HLTH RISK ASSMT: CPT | Performed by: FAMILY MEDICINE

## 2023-01-10 PROCEDURE — 1159F MED LIST DOCD IN RCRD: CPT | Performed by: FAMILY MEDICINE

## 2023-01-10 PROCEDURE — 1170F FXNL STATUS ASSESSED: CPT | Performed by: FAMILY MEDICINE

## 2023-01-10 PROCEDURE — G0439 PPPS, SUBSEQ VISIT: HCPCS | Performed by: FAMILY MEDICINE

## 2023-01-10 PROCEDURE — 99214 OFFICE O/P EST MOD 30 MIN: CPT | Performed by: FAMILY MEDICINE

## 2023-01-10 NOTE — PROGRESS NOTES
The ABCs of the Annual Wellness Visit  Subsequent Medicare Wellness Visit    Subjective    Joi Aleman is a 77 y.o. female who presents for a Subsequent Medicare Wellness Visit.    The following portions of the patient's history were reviewed and   updated as appropriate: allergies, current medications, past family history, past medical history, past social history, past surgical history and problem list.    Compared to one year ago, the patient feels her physical   health is the same.    Compared to one year ago, the patient feels her mental   health is better.    Recent Hospitalizations:  She was not admitted to the hospital during the last year.       Current Medical Providers:  Patient Care Team:  Yaneth Leal MD as PCP - General (Family Medicine)    Outpatient Medications Prior to Visit   Medication Sig Dispense Refill   • amLODIPine (NORVASC) 10 MG tablet TAKE 1 TABLET EVERY DAY (Patient taking differently: Take 5 mg by mouth Daily.) 90 tablet 1   • aspirin 81 MG EC tablet Take 1 tablet twice daily x 14 days; then take 1 daily x 28 days (Patient taking differently: Take 1 tablet twice daily x 14 days; then take 1 daily x 28 days) 60 tablet 0   • atorvastatin (LIPITOR) 20 MG tablet TAKE 1 TABLET EVERY DAY 90 tablet 1   • cephalexin (KEFLEX) 500 MG capsule Take all 4 capsules one hour prior to procedure 4 capsule 5   • hydroCHLOROthiazide (MICROZIDE) 12.5 MG capsule Take 1 capsule by mouth Daily. 90 capsule 3   • loperamide (IMODIUM) 2 MG capsule TAKE 4 CAPSULES BY MOUTH DAILY. 360 capsule 1   • magnesium oxide (MAG-OX) 400 tablet tablet Take 400 mg by mouth Daily.     • metoprolol tartrate (LOPRESSOR) 25 MG tablet Take 0.5 tablets by mouth 2 (Two) Times a Day. (Patient taking differently: Take 12.5 mg by mouth Daily.) 180 tablet 1   • oxybutynin (DITROPAN) 5 MG tablet Take 1 tablet by mouth 2 (Two) Times a Day. Urge urination 180 tablet 2   • sertraline (ZOLOFT) 100 MG tablet TAKE 2 TABLETS EVERY   tablet 1   • traZODone (DESYREL) 100 MG tablet TAKE 1 TABLET EVERY NIGHT 90 tablet 1   • acetaminophen (TYLENOL) 325 MG tablet Take 650 mg by mouth Every 6 (Six) Hours As Needed for Mild Pain .       No facility-administered medications prior to visit.       No opioid medication identified on active medication list. I have reviewed chart for other potential  high risk medication/s and harmful drug interactions in the elderly.          Aspirin is on active medication list. Aspirin use is indicated based on review of current medical condition/s. Pros and cons of this therapy have been discussed today. Benefits of this medication outweigh potential harm.  Patient has been encouraged to continue taking this medication.  .      Patient Active Problem List   Diagnosis   • Adaptive colitis   • Arthritis   • Avitaminosis D   • Depression   • Dermatitis seborrheica   • Essential hypertension   • HLD (hyperlipidemia)   • Pulmonary nodule   • Right groin pain   • Coronary sinus abnormality   • Pain   • Primary osteoarthritis of right knee   • Myocardial bridge   • Mitral valve prolapse   • Agatston CAC score, <100   • Stress incontinence   • Skin tag   • Urge incontinence     Advance Care Planning  Advance Directive is not on file.  ACP discussion was held with the patient during this visit. Patient does not have an advance directive, declines further assistance.     Objective    Vitals:    01/10/23 1041   BP: 126/72   Pulse: 54   Temp: 97.9 °F (36.6 °C)   SpO2: 97%   Weight: 75.3 kg (166 lb)   Height: 157.5 cm (62\")   PainSc: 0-No pain     Estimated body mass index is 30.36 kg/m² as calculated from the following:    Height as of this encounter: 157.5 cm (62\").    Weight as of this encounter: 75.3 kg (166 lb).    BMI is >= 30 and <35. (Class 1 Obesity). The following options were offered after discussion;: exercise counseling/recommendations and nutrition counseling/recommendations      Does the patient have evidence of  cognitive impairment? No          HEALTH RISK ASSESSMENT    Smoking Status:  Social History     Tobacco Use   Smoking Status Former   • Packs/day: 1.50   • Years: 15.00   • Pack years: 22.50   • Types: Cigarettes   • Start date:    • Quit date:    • Years since quittin.0   Smokeless Tobacco Never     Alcohol Consumption:  Social History     Substance and Sexual Activity   Alcohol Use Yes    Comment: once month     Fall Risk Screen:    JEANNINE Fall Risk Assessment was completed, and patient is at HIGH risk for falls. Assessment completed on:1/10/2023    Depression Screening:  PHQ-2/PHQ-9 Depression Screening 1/10/2023   Little Interest or Pleasure in Doing Things 0-->not at all   Feeling Down, Depressed or Hopeless 0-->not at all   PHQ-9: Brief Depression Severity Measure Score 0       Health Habits and Functional and Cognitive Screening:  Functional & Cognitive Status 1/10/2023   Do you have difficulty preparing food and eating? Yes   Do you have difficulty bathing yourself, getting dressed or grooming yourself? No   Do you have difficulty using the toilet? No   Do you have difficulty moving around from place to place? No   Do you have trouble with steps or getting out of a bed or a chair? No   Current Diet Well Balanced Diet   Dental Exam Up to date   Eye Exam Not up to date   Exercise (times per week) 2 times per week   Current Exercises Include Walking   Current Exercise Activities Include -   Do you need help using the phone?  No   Are you deaf or do you have serious difficulty hearing?  No   Do you need help with transportation? No   Do you need help shopping? No   Do you need help preparing meals?  No   Do you need help with housework?  No   Do you need help with laundry? No   Do you need help taking your medications? No   Do you need help managing money? No   Do you ever drive or ride in a car without wearing a seat belt? No   Have you felt unusual stress, anger or loneliness in the last month? No    Who do you live with? Alone   If you need help, do you have trouble finding someone available to you? No   Have you been bothered in the last four weeks by sexual problems? No   Do you have difficulty concentrating, remembering or making decisions? No       Age-appropriate Screening Schedule:  Refer to the list below for future screening recommendations based on patient's age, sex and/or medical conditions. Orders for these recommended tests are listed in the plan section. The patient has been provided with a written plan.    Health Maintenance   Topic Date Due   • TDAP/TD VACCINES (1 - Tdap) Never done   • ZOSTER VACCINE (2 of 3) 02/26/2009   • DXA SCAN  07/14/2022   • LIPID PANEL  12/20/2022   • INFLUENZA VACCINE  Completed                CMS Preventative Services Quick Reference  Risk Factors Identified During Encounter  Chronic Pain: Natural history and expected course discussed. Questions answered.  Depression/Dysphoria: Current medication is effective, no change recommended  Fall Risk-High or Moderate: Discussed Fall Prevention in the home  Immunizations Discussed/Encouraged: Tdap and Shingrix  Inadequate Social Support, Isolation, Loneliness, Lack of Transportation, Financial Difficulties, or Caregiver Stress: Difficulty with leaving the home, often financial challenges.  Inactivity/Sedentary: Patient was advised to exercise at least 150 minutes a week per CDC recommendations.  The above risks/problems have been discussed with the patient.  Pertinent information has been shared with the patient in the After Visit Summary.  An After Visit Summary and PPPS were made available to the patient.    Follow Up:   Next Medicare Wellness visit to be scheduled in 1 year.       Additional E&M Note during same encounter follows:  Patient has multiple medical problems which are significant and separately identifiable that require additional work above and beyond the Medicare Wellness Visit.      Chief Complaint  Medicare  Wellness-subsequent ( Would like hearing test today ,and insurance since she fell would like to do dexa scan on her )    Subjective        HPI  Joi Aleman is also being seen today for concerns for a fall she encountered in the fall, finger concern, urge incontinence, knee pain and blurry vision.    She fell in Early ste[place], fractured her leg, and had contusion on the face.  She did go to the emergency at that time, has been followed up by hand surgeon since then.  Overall she has been improving, no residual falls but she has not been doing physical therapy, we discussed this, I do think this will help prevent recurrent falls but she does not want to go outside of her home.     Her fingers have been deviating, saw a hand surgeon and was told only corrected with surgery, trying to find a Velcro strap to connect her fingers, we discussed considering mandeep taping.    At night, if her had is flat it is hard to make a fist, when she wakes up she has to warm it up.  She has had decreased strength in her hand, requesting hand exercises    Urge incontinence not  Well controled on oxybutynin 5 mg, it does cause some dry mouth, she would like to consider other medications, not wanting to see any other specialist.    L knee, weakness.  No pain, she feels like it is going to give way. She is sedentary mosst of the day, had to ride a bike longer than 5 min.     R eye with some blurry vision, still with bump on her forehead.     Objective   Vital Signs:  /72   Pulse 54   Temp 97.9 °F (36.6 °C)   Ht 157.5 cm (62\")   Wt 75.3 kg (166 lb)   SpO2 97%   BMI 30.36 kg/m²     Physical Exam  Vitals and nursing note reviewed.   Constitutional:       General: She is not in acute distress.     Appearance: Normal appearance. She is well-developed.   HENT:      Head: Normocephalic.      Right Ear: Tympanic membrane and ear canal normal. There is no impacted cerumen.      Left Ear: Tympanic membrane and ear canal normal. There is  no impacted cerumen.      Nose: Nose normal.   Eyes:      Conjunctiva/sclera: Conjunctivae normal.      Pupils: Pupils are equal, round, and reactive to light.   Neck:      Vascular: No carotid bruit.   Cardiovascular:      Rate and Rhythm: Normal rate and regular rhythm.      Heart sounds: Normal heart sounds. No murmur heard.  Pulmonary:      Effort: Pulmonary effort is normal. No respiratory distress.      Breath sounds: Normal breath sounds.   Abdominal:      General: Abdomen is flat. Bowel sounds are normal. There is no distension.      Tenderness: There is no abdominal tenderness.   Musculoskeletal:         General: Normal range of motion.   Skin:     General: Skin is warm and dry.      Findings: No rash.   Neurological:      Mental Status: She is alert and oriented to person, place, and time.   Psychiatric:         Behavior: Behavior normal.         Thought Content: Thought content normal.         Judgment: Judgment normal.                         Assessment and Plan   Diagnoses and all orders for this visit:    1. Medicare annual wellness visit, subsequent (Primary)    2. Essential hypertension  -     Comprehensive Metabolic Panel  -     CBC & Differential  -     Lipid Panel  -     TSH Rfx On Abnormal To Free T4    3. Mixed hyperlipidemia  -     Lipid Panel    4. Avitaminosis D  -     Vitamin D,25-Hydroxy    5. Screen for colon cancer  -     Ambulatory Referral For Screening Colonoscopy    6. Postmenopausal  -     DEXA Bone Density Axial; Future    7. Encounter for screening mammogram for malignant neoplasm of breast  -     Mammo Screening Digital Tomosynthesis Bilateral With CAD; Future    8. Moderate episode of recurrent major depressive disorder (HCC)    9. Urge incontinence    10. Weakness of both legs    We spent the bulk of our time discussing her concerns with hand and knee pain especially, she did have a fall in September unfortunately.  She sustained a fracture in her hands and some contusions,  handouts for hand exercises given, she has been released by hand surgery.  I do think it would be imperative for her to start to exercise regularly, I highly recommend physical therapy which she refuses at this time.  I understand that it is very challenging to get out of the home but I think it will be essential for her to prevent further falls.  For now I gave her some home exercises to do and advised her to get on her bike.  She states she cannot get on it longer than 5 minutes.  As she does spend a good amount of time watching her TV and her bike is nearby recommended that she try to ride 5 minutes every show she watches.    She is due for all the screening test above, labs as above.    From an urgent continence standpoint, recommended that we consider Myrbetriq she would like to read about this and let me know if she wants to consider this.  It would replace oxybutynin if she was to start it.    Depression overall stable, she says well controlled on sertraline 200 mg daily no changes at this time.  Sleep doing well on trazodone.         Follow Up   Return in about 6 months (around 7/10/2023), or if symptoms worsen or fail to improve, for Recheck HTN with labs prior .  Patient was given instructions and counseling regarding her condition or for health maintenance advice. Please see specific information pulled into the AVS if appropriate. Medical assistant and I wore mask and eyewear protection during entire encounter.  Patient wore mask.    Yaneth Leal MD

## 2023-01-11 LAB
25(OH)D3+25(OH)D2 SERPL-MCNC: 57.7 NG/ML (ref 30–100)
ALBUMIN SERPL-MCNC: 4.4 G/DL (ref 3.7–4.7)
ALBUMIN/GLOB SERPL: 2.4 {RATIO} (ref 1.2–2.2)
ALP SERPL-CCNC: 89 IU/L (ref 44–121)
ALT SERPL-CCNC: 38 IU/L (ref 0–32)
AST SERPL-CCNC: 30 IU/L (ref 0–40)
BASOPHILS # BLD AUTO: 0.1 X10E3/UL (ref 0–0.2)
BASOPHILS NFR BLD AUTO: 1 %
BILIRUB SERPL-MCNC: 0.4 MG/DL (ref 0–1.2)
BUN SERPL-MCNC: 16 MG/DL (ref 8–27)
BUN/CREAT SERPL: 19 (ref 12–28)
CALCIUM SERPL-MCNC: 9.4 MG/DL (ref 8.7–10.3)
CHLORIDE SERPL-SCNC: 96 MMOL/L (ref 96–106)
CHOLEST SERPL-MCNC: 219 MG/DL (ref 100–199)
CO2 SERPL-SCNC: 27 MMOL/L (ref 20–29)
CREAT SERPL-MCNC: 0.85 MG/DL (ref 0.57–1)
EGFRCR SERPLBLD CKD-EPI 2021: 71 ML/MIN/1.73
EOSINOPHIL # BLD AUTO: 0.2 X10E3/UL (ref 0–0.4)
EOSINOPHIL NFR BLD AUTO: 2 %
ERYTHROCYTE [DISTWIDTH] IN BLOOD BY AUTOMATED COUNT: 13.1 % (ref 11.7–15.4)
GLOBULIN SER CALC-MCNC: 1.8 G/DL (ref 1.5–4.5)
GLUCOSE SERPL-MCNC: 89 MG/DL (ref 70–99)
HCT VFR BLD AUTO: 38.9 % (ref 34–46.6)
HDLC SERPL-MCNC: 79 MG/DL
HGB BLD-MCNC: 13.1 G/DL (ref 11.1–15.9)
IMM GRANULOCYTES # BLD AUTO: 0 X10E3/UL (ref 0–0.1)
IMM GRANULOCYTES NFR BLD AUTO: 0 %
LDLC SERPL CALC-MCNC: 122 MG/DL (ref 0–99)
LYMPHOCYTES # BLD AUTO: 1.8 X10E3/UL (ref 0.7–3.1)
LYMPHOCYTES NFR BLD AUTO: 23 %
MCH RBC QN AUTO: 30.4 PG (ref 26.6–33)
MCHC RBC AUTO-ENTMCNC: 33.7 G/DL (ref 31.5–35.7)
MCV RBC AUTO: 90 FL (ref 79–97)
MONOCYTES # BLD AUTO: 0.9 X10E3/UL (ref 0.1–0.9)
MONOCYTES NFR BLD AUTO: 11 %
NEUTROPHILS # BLD AUTO: 4.9 X10E3/UL (ref 1.4–7)
NEUTROPHILS NFR BLD AUTO: 63 %
PLATELET # BLD AUTO: 260 X10E3/UL (ref 150–450)
POTASSIUM SERPL-SCNC: 4.5 MMOL/L (ref 3.5–5.2)
PROT SERPL-MCNC: 6.2 G/DL (ref 6–8.5)
RBC # BLD AUTO: 4.31 X10E6/UL (ref 3.77–5.28)
SODIUM SERPL-SCNC: 138 MMOL/L (ref 134–144)
TRIGL SERPL-MCNC: 103 MG/DL (ref 0–149)
TSH SERPL DL<=0.005 MIU/L-ACNC: 1.42 UIU/ML (ref 0.45–4.5)
VLDLC SERPL CALC-MCNC: 18 MG/DL (ref 5–40)
WBC # BLD AUTO: 7.8 X10E3/UL (ref 3.4–10.8)

## 2023-01-29 NOTE — PROGRESS NOTES
Please call patient back with results.  The labs has resulted as overall stable compared to before except for worsening liver function, are you aware if there was any cause to the recent change in liver enzymes?  Particularly with taking excess alcohol, Tylenol or other supplements?  I would recommend avoiding these, we can reassess labs only in 3 months, Fidelia do mind placing order for a CMP in 3 months.  Thank you    Please let us know if you have further questions.     Thank you

## 2023-02-03 DIAGNOSIS — I10 ESSENTIAL HYPERTENSION: ICD-10-CM

## 2023-02-03 DIAGNOSIS — R74.8 ELEVATED LIVER ENZYMES: Primary | ICD-10-CM

## 2023-02-08 ENCOUNTER — PRE-PROCEDURE SCREENING (OUTPATIENT)
Dept: GASTROENTEROLOGY | Facility: CLINIC | Age: 78
End: 2023-02-08
Payer: MEDICARE

## 2023-02-08 NOTE — TELEPHONE ENCOUNTER
LAST SCOPE 10YRS ( NO RECORDS) --No personal history of polyps--No family history of polyps or colon cancer--ASPIRIN--Medications:                amLODIPine (NORVASC) 10 MG tablet  aspirin 81 MG EC tablet  atorvastatin (LIPITOR) 20 MG tablet  cephalexin (KEFLEX) 500 MG capsule    hydroCHLOROthiazide (MICROZIDE) 12.5 MG capsule  loperamide (IMODIUM) 2 MG capsule  magnesium oxide (MAG-OX) 400 tablet tablet    metoprolol tartrate (LOPRESSOR) 25 MG tablet  oxybutynin (DITROPAN) 5 MG tablet    sertraline (ZOLOFT) 100 MG tablet    traZODone (DESYREL) 100 MG tablet          QUESTIONNAIRE SCEEENING  HAS BEEN SENT TO DOCTOR FOR REVIEW

## 2023-02-10 DIAGNOSIS — I10 ESSENTIAL HYPERTENSION: ICD-10-CM

## 2023-02-10 DIAGNOSIS — F51.01 PRIMARY INSOMNIA: ICD-10-CM

## 2023-02-10 DIAGNOSIS — Z12.11 COLON CANCER SCREENING: Primary | ICD-10-CM

## 2023-02-10 RX ORDER — TRAZODONE HYDROCHLORIDE 100 MG/1
TABLET ORAL
Qty: 90 TABLET | Refills: 1 | Status: SHIPPED | OUTPATIENT
Start: 2023-02-10

## 2023-02-10 RX ORDER — SODIUM CHLORIDE, SODIUM LACTATE, POTASSIUM CHLORIDE, CALCIUM CHLORIDE 600; 310; 30; 20 MG/100ML; MG/100ML; MG/100ML; MG/100ML
30 INJECTION, SOLUTION INTRAVENOUS CONTINUOUS
Status: CANCELLED | OUTPATIENT
Start: 2023-05-16

## 2023-02-15 ENCOUNTER — TELEPHONE (OUTPATIENT)
Dept: GASTROENTEROLOGY | Facility: CLINIC | Age: 78
End: 2023-02-15
Payer: MEDICARE

## 2023-02-15 PROBLEM — Z12.11 COLON CANCER SCREENING: Status: ACTIVE | Noted: 2023-02-15

## 2023-02-15 NOTE — TELEPHONE ENCOUNTER
SHANON Berg for colonoscopy on 5/16/23  arrive at 8am   . Mailed Prep instructions to Mailing address on-file. ----miralax

## 2023-02-22 ENCOUNTER — HOSPITAL ENCOUNTER (OUTPATIENT)
Dept: BONE DENSITY | Facility: HOSPITAL | Age: 78
Discharge: HOME | End: 2023-02-22
Payer: MEDICARE

## 2023-02-22 ENCOUNTER — HOSPITAL ENCOUNTER (OUTPATIENT)
Dept: MAMMOGRAPHY | Facility: HOSPITAL | Age: 78
Discharge: HOME | End: 2023-02-22
Payer: MEDICARE

## 2023-02-22 DIAGNOSIS — Z78.0 POSTMENOPAUSAL: ICD-10-CM

## 2023-02-22 DIAGNOSIS — Z12.31 ENCOUNTER FOR SCREENING MAMMOGRAM FOR MALIGNANT NEOPLASM OF BREAST: ICD-10-CM

## 2023-02-22 PROCEDURE — 77080 DXA BONE DENSITY AXIAL: CPT

## 2023-02-22 PROCEDURE — 77063 BREAST TOMOSYNTHESIS BI: CPT

## 2023-02-22 PROCEDURE — 77067 SCR MAMMO BI INCL CAD: CPT

## 2023-02-24 NOTE — PROGRESS NOTES
Please call patient back with results.  The mammogram has resulted as showing some asymmetry on the left breast, there are reassuring findings that there are no masses or calcifications.  However as the left side does look different than the right recommendations for diagnostic mammogram and ultrasound.  If you are in agreement with this we will place orders for further testing.  Please let us know if you have further questions.     Thank you

## 2023-02-27 DIAGNOSIS — R92.8 ABNORMALITY OF LEFT BREAST ON SCREENING MAMMOGRAM: Primary | ICD-10-CM

## 2023-02-27 NOTE — PROGRESS NOTES
Please call patient back with results.  The DEXA Scan has resulted as Osteopenia with significant bone density loss compared to 2020.  I recommend doing weight bearing exercises and Calcium and Vit D supplementation. OK to repeat in 2 years.    Thank you

## 2023-03-06 ENCOUNTER — TELEPHONE (OUTPATIENT)
Dept: FAMILY MEDICINE CLINIC | Facility: CLINIC | Age: 78
End: 2023-03-06
Payer: MEDICARE

## 2023-03-06 NOTE — TELEPHONE ENCOUNTER
Patient called stating she is experiencing, shakiness, weakness and strange feelings in chest.     Patient declined going to ER after advising.  Patient only wanted to see Dr Kelly for issues. Advised Dr Kelly does not have availability and again, to go to the ER for evaluation.

## 2023-03-07 NOTE — TELEPHONE ENCOUNTER
Fidelia, do mind giving her a call and better understanding her symptoms.  Is she feeling very weak?  I am happy to work her in but unfortunately a.m. finding an upper respiratory infection myself so I am not in the office the rest of the day.  But, I am concerned about her symptoms.  Particularly with relationship to chest tightness.  It could be lung or heart and could be more serious.

## 2023-03-07 NOTE — TELEPHONE ENCOUNTER
I would recommend that she go to the emergency room, it really will be the safest option if she is having shakiness and pressure in her chest.

## 2023-03-07 NOTE — TELEPHONE ENCOUNTER
Pt advised to go er if she feels chest pain or discomfort,he is more afraid of bs dropping or diabetes  ,She had episodes of weakness  Afraid passing out in the  past weeks  Will be evaluated on Monday  Advise to go er ,drink water and  Have small  every 3 /4 hours

## 2023-03-13 ENCOUNTER — OFFICE VISIT (OUTPATIENT)
Dept: FAMILY MEDICINE CLINIC | Facility: CLINIC | Age: 78
End: 2023-03-13
Payer: MEDICARE

## 2023-03-13 VITALS
WEIGHT: 166 LBS | BODY MASS INDEX: 30.55 KG/M2 | SYSTOLIC BLOOD PRESSURE: 134 MMHG | TEMPERATURE: 97.8 F | DIASTOLIC BLOOD PRESSURE: 62 MMHG | HEIGHT: 62 IN | HEART RATE: 51 BPM | OXYGEN SATURATION: 98 %

## 2023-03-13 DIAGNOSIS — R79.9 ABNORMAL FINDING OF BLOOD CHEMISTRY, UNSPECIFIED: ICD-10-CM

## 2023-03-13 DIAGNOSIS — R25.1 TREMOR: ICD-10-CM

## 2023-03-13 DIAGNOSIS — I10 ESSENTIAL HYPERTENSION: Primary | ICD-10-CM

## 2023-03-13 DIAGNOSIS — E16.2 HYPOGLYCEMIA: ICD-10-CM

## 2023-03-13 DIAGNOSIS — R25.1 SHAKINESS: ICD-10-CM

## 2023-03-13 LAB
EXPIRATION DATE: 2024
HBA1C MFR BLD: 5.5 %
Lab: 556

## 2023-03-13 PROCEDURE — 3078F DIAST BP <80 MM HG: CPT | Performed by: FAMILY MEDICINE

## 2023-03-13 PROCEDURE — 1159F MED LIST DOCD IN RCRD: CPT | Performed by: FAMILY MEDICINE

## 2023-03-13 PROCEDURE — 99214 OFFICE O/P EST MOD 30 MIN: CPT | Performed by: FAMILY MEDICINE

## 2023-03-13 PROCEDURE — 1160F RVW MEDS BY RX/DR IN RCRD: CPT | Performed by: FAMILY MEDICINE

## 2023-03-13 PROCEDURE — 3075F SYST BP GE 130 - 139MM HG: CPT | Performed by: FAMILY MEDICINE

## 2023-03-13 PROCEDURE — 83036 HEMOGLOBIN GLYCOSYLATED A1C: CPT | Performed by: FAMILY MEDICINE

## 2023-03-13 PROCEDURE — 3044F HG A1C LEVEL LT 7.0%: CPT | Performed by: FAMILY MEDICINE

## 2023-03-13 PROCEDURE — 36416 COLLJ CAPILLARY BLOOD SPEC: CPT | Performed by: FAMILY MEDICINE

## 2023-03-13 NOTE — PROGRESS NOTES
"Answers for HPI/ROS submitted by the patient on 3/6/2023  Please describe your symptoms.: Light headed, Shakey, Weakness in chest, Sugar craving, Better after I lay down for a while  Have you had these symptoms before?: Yes  How long have you been having these symptoms?: Greater than 2 weeks  Please describe any probable cause for these symptoms. : They've been getting worse  What is the primary reason for your visit?: Other    Chief Complaint  Hypertension (No complains doing well ) and Fatigue (Sometimes  has  episodes of weakness and she feels is diet relate  is trying to have some food and  exercising )    Subjective        Joi Aleman presents to [facility]  History of Present Illness  Pleasant 77-year-old female here for symptoms of fatigue with weakness and episodes of feeling shaky and hypertension.    Hypertension is thankfully well controlled.    In terms of these other symptoms, recent blood sugar was mildly elevated.  It started weeks ago, feels like shakiness, weakness, light headedness and cravings for sweets.  She does take her blood pressure regularly and has been what it is today.  It does resolve with eating something sweet. It has gotten better with eating more often.     Objective   Vital Signs:  /62   Pulse 51   Temp 97.8 °F (36.6 °C)   Ht 157.5 cm (62\")   Wt 75.3 kg (166 lb)   SpO2 98%   BMI 30.36 kg/m²   Estimated body mass index is 30.36 kg/m² as calculated from the following:    Height as of this encounter: 157.5 cm (62\").    Weight as of this encounter: 75.3 kg (166 lb).             Physical Exam  Vitals and nursing note reviewed.   Constitutional:       General: She is not in acute distress.     Appearance: She is well-developed.   HENT:      Head: Normocephalic.      Nose: Nose normal.   Cardiovascular:      Rate and Rhythm: Normal rate and regular rhythm.      Heart sounds: Normal heart sounds. No murmur heard.  Pulmonary:      Effort: " Pulmonary effort is normal. No respiratory distress.      Breath sounds: Normal breath sounds.   Musculoskeletal:         General: Normal range of motion.   Skin:     General: Skin is warm and dry.      Findings: No rash.   Neurological:      Mental Status: She is alert and oriented to person, place, and time.   Psychiatric:         Behavior: Behavior normal.         Thought Content: Thought content normal.         Judgment: Judgment normal.        Result Review :  The following data was reviewed by: Yaneth Kelly MD on 03/13/2023:  CMP    CMP 7/7/22 1/10/23 3/1/23   Glucose 89 89 107 (A)   BUN 21 16 18   Creatinine 0.99 0.85 0.95   Sodium 133 (A) 138 140   Potassium 4.6 4.5 3.9   Chloride 92 (A) 96 99   Calcium 9.5 9.4 9.6   Total Protein 6.2 6.2 6.2   Albumin 4.2 4.4 4.3   Globulin 2.0 1.8 1.9   Total Bilirubin 0.4 0.4 0.4   Alkaline Phosphatase 94 89 82   AST (SGOT) 19 30 26   ALT (SGPT) 11 38 (A) 34 (A)   BUN/Creatinine Ratio 21 19 18.9   (A) Abnormal value            CBC    CBC 4/18/22 7/7/22 1/10/23   WBC 7.06 8.6 7.8   RBC 4.51 4.44 4.31   Hemoglobin 13.5 13.4 13.1   Hematocrit 40.2 39.9 38.9   MCV 89.1 90 90   MCH 29.9 30.2 30.4   MCHC 33.6 33.6 33.7   RDW 13.4 12.8 13.1   Platelets 225 280 260           Lipid Panel    Lipid Panel 1/10/23   Total Cholesterol 219 (A)   Triglycerides 103   HDL Cholesterol 79   VLDL Cholesterol 18   LDL Cholesterol  122 (A)   (A) Abnormal value            TSH    TSH 7/7/22 1/10/23   TSH 1.280 1.420           A1C Last 3 Results    HGBA1C Last 3 Results 3/13/23   Hemoglobin A1C 5.5                        Assessment and Plan   Diagnoses and all orders for this visit:    1. Essential hypertension (Primary)  -     CBC & Differential; Future  -     Lipid Panel; Future  -     Comprehensive metabolic panel; Future    2. Tremor    3. Shakiness  -     Hemoglobin A1c; Future    4. Abnormal finding of blood chemistry, unspecified  -     Hemoglobin A1c; Future    5. Hypoglycemia  -      POC Glycosylated Hemoglobin (Hb A1C)    Hypertension very well controlled on amlodipine 5 mg daily, metoprolol 25 mg daily.  No adverse effects to medications.  Plan to continue same and follow-up in 6 months.    Symptoms of shakiness, initially these do sound like hypoglycemic events especially as they improved with having juice or candy.  We discussed eating a more consistent carb diet, possibly adding more fiber or healthy fats like yogurt to her breakfast to see if this minimizes these episodes.  Hemoglobin A1c is excellent no hypoglycemia.         Follow Up   Return in about 6 months (around 9/13/2023), or if symptoms worsen or fail to improve, for Recheck HTN with lab prior .  Patient was given instructions and counseling regarding her condition or for health maintenance advice. Please see specific information pulled into the AVS if appropriate. Medical assistant and I wore mask and eyewear protection during entire encounter.  Patient wore mask.    Yaneth Kelly MD

## 2023-03-29 ENCOUNTER — HOSPITAL ENCOUNTER (OUTPATIENT)
Dept: MAMMOGRAPHY | Facility: HOSPITAL | Age: 78
Discharge: HOME | End: 2023-03-29
Admitting: FAMILY MEDICINE
Payer: MEDICARE

## 2023-03-29 ENCOUNTER — HOSPITAL ENCOUNTER (OUTPATIENT)
Dept: ULTRASOUND IMAGING | Facility: HOSPITAL | Age: 78
Discharge: HOME | End: 2023-03-29
Admitting: FAMILY MEDICINE
Payer: MEDICARE

## 2023-03-29 DIAGNOSIS — R92.8 ABNORMALITY OF LEFT BREAST ON SCREENING MAMMOGRAM: ICD-10-CM

## 2023-03-29 PROCEDURE — G0279 TOMOSYNTHESIS, MAMMO: HCPCS

## 2023-03-29 PROCEDURE — 76642 ULTRASOUND BREAST LIMITED: CPT

## 2023-03-29 PROCEDURE — 77065 DX MAMMO INCL CAD UNI: CPT

## 2023-03-31 DIAGNOSIS — F41.9 ANXIETY DUE TO INVASIVE PROCEDURE: Primary | ICD-10-CM

## 2023-03-31 RX ORDER — DIAZEPAM 2 MG/1
2 TABLET ORAL 2 TIMES DAILY PRN
Qty: 1 TABLET | Refills: 0 | Status: SHIPPED | OUTPATIENT
Start: 2023-03-31

## 2023-03-31 NOTE — PROGRESS NOTES
I called the patient with results. The mammogram has resulted as showing some cysts that do need a follow up with a biopsy. Pt agreed to the ultrasound-guided biopsy.  I also sent 2 mg of Valium.  Fidelia, do mind placing the orders for the biopsy?    Thank you

## 2023-04-03 DIAGNOSIS — R92.8 ABNORMAL MAMMOGRAM OF LEFT BREAST: Primary | ICD-10-CM

## 2023-04-10 NOTE — NURSING NOTE
Per Epic reply from Dr. NAIN Estrada, from a cardiac standpoint, patient may hold Aspirin x5 days prior to biopsy.

## 2023-04-11 ENCOUNTER — OFFICE VISIT (OUTPATIENT)
Dept: ORTHOPEDIC SURGERY | Facility: CLINIC | Age: 78
End: 2023-04-11
Payer: MEDICARE

## 2023-04-11 VITALS — HEIGHT: 62 IN | TEMPERATURE: 97.3 F | WEIGHT: 166 LBS | RESPIRATION RATE: 16 BRPM | BODY MASS INDEX: 30.55 KG/M2

## 2023-04-11 DIAGNOSIS — R52 PAIN: Primary | ICD-10-CM

## 2023-04-11 DIAGNOSIS — M76.32 IT BAND SYNDROME, LEFT: ICD-10-CM

## 2023-04-11 PROCEDURE — 99213 OFFICE O/P EST LOW 20 MIN: CPT | Performed by: NURSE PRACTITIONER

## 2023-04-11 PROCEDURE — 73562 X-RAY EXAM OF KNEE 3: CPT | Performed by: NURSE PRACTITIONER

## 2023-04-11 NOTE — PROGRESS NOTES
Patient: Joi Aleman  YOB: 1945 77 y.o. female  Medical Record Number: [id number]    Chief Complaints:   Chief Complaint   Patient presents with   • Left Knee - Pain, Initial Evaluation       History of Present Illness:Joi Aleman is a 77 y.o. female who presents with plaints of having left thigh to shin pain that started about a month and a half ago.  Patient reports no known injury that brought on the symptoms.  Patient states that the pain is located to her mid thigh and radiates down to her shin area.  Patient describes the pain as a moderate ache and reports that sometimes her knee piyush on her.  Patient also states she can feel like a clicking sensation over the anterior lateral portion of her knee.  Patient states her symptoms are not constant and they come and go and over the past few days they have not been significant.  She denies any signs or symptoms of infection, and she is without any other significant complaints today.    Allergies: No Known Allergies    Medications:   Current Outpatient Medications   Medication Sig Dispense Refill   • amLODIPine (NORVASC) 10 MG tablet TAKE 1 TABLET EVERY DAY (Patient taking differently: Take 5 mg by mouth Daily.) 90 tablet 1   • aspirin 81 MG EC tablet Take 1 tablet twice daily x 14 days; then take 1 daily x 28 days (Patient taking differently: Take 1 tablet twice daily x 14 days; then take 1 daily x 28 days) 60 tablet 0   • atorvastatin (LIPITOR) 20 MG tablet TAKE 1 TABLET EVERY DAY 90 tablet 1   • cephalexin (KEFLEX) 500 MG capsule Take all 4 capsules one hour prior to procedure 4 capsule 5   • diazePAM (Valium) 2 MG tablet Take 1 tablet by mouth 2 (Two) Times a Day As Needed for Anxiety. 1 tablet 0   • hydroCHLOROthiazide (MICROZIDE) 12.5 MG capsule Take 1 capsule by mouth Daily. 90 capsule 3   • loperamide (IMODIUM) 2 MG capsule TAKE 4 CAPSULES BY MOUTH DAILY. 360 capsule 1   • magnesium oxide (MAG-OX) 400 tablet tablet Take 1 tablet by  "mouth Daily.     • metoprolol tartrate (LOPRESSOR) 25 MG tablet TAKE 1 TABLET TWICE DAILY 180 tablet 1   • oxybutynin (DITROPAN) 5 MG tablet Take 1 tablet by mouth 2 (Two) Times a Day. Urge urination 180 tablet 2   • sertraline (ZOLOFT) 100 MG tablet TAKE 2 TABLETS EVERY  tablet 1   • traZODone (DESYREL) 100 MG tablet TAKE 1 TABLET EVERY NIGHT 90 tablet 1     No current facility-administered medications for this visit.         The following portions of the patient's history were reviewed and updated as appropriate: allergies, current medications, past family history, past medical history, past social history, past surgical history and problem list.    Review of Systems:   Pertinent positives/negatives listed in HPI above    Physical Exam:   Vitals:    04/11/23 1343   Resp: 16   Temp: 97.3 °F (36.3 °C)   TempSrc: Temporal   Weight: 75.3 kg (166 lb)   Height: 157.5 cm (62.01\")   PainSc: 0-No pain       General: A and O x 3, ASA, NAD      Knee Exam List: Knee:  left    ALIGNMENT:     Neutral  ,   Patella tracks   midline    GAIT:     Nonantalgic    SKIN:    No abnormality    RANGE OF MOTION:   0  -  135   DEG    STRENGTH:   5 / 5    LIGAMENTS:    No varus / valgus instability.   Negative  Lachman.    MENISCUS:     Negative   Virgie       DISTAL PULSES:    Paplable    DISTAL SENSATION :   Intact    LYMPHATICS:     No   lymphadenopathy    OTHER:          - No  effusion      - No crepitance with ROM      - No Swelling /  tenderness to palpation pes anserine bursa   Patient's symptoms seem to be located around the IT band and insertion site      Radiology:  Xrays 3views left knee (ap,lateral, sunrise) were ordered and reviewed for evaluation of knee pain demonstrating osteoarthritic changes through the medial and lateral compartment with periarticular osteophytes present.  Patient does have more advanced patellofemoral osteoarthritis.  Compared to previous films patient does demonstrate arthritic changes " noted.    Assessment/Plan: IT band syndrome left side    Treatment options as well as imaging results were discussed in detail with the patient.  Based off the patient's symptoms and examination she is exhibiting symptoms of IT band syndrome.  I am going to give the patient a prescription for physical therapy to work on exercising and stretching.  If the patient's symptoms or not improving the next 6 to 8 weeks she can follow back up with me for reevaluation.    Diagnoses and all orders for this visit:    1. Pain (Primary)  -     XR Knee 3 View Left    2. It band syndrome, left  -     Ambulatory Referral to Physical Therapy Evaluate and treat, Ortho; (as indicated)         Ivan Calderón, JANKI  4/11/2023

## 2023-04-12 NOTE — PROGRESS NOTES
04/24/23 0001   Pre-Procedure Phone Call   Procedure Time Verified Yes   Arrival Time 1330   Procedure Location Verified Yes   Medical History Reviewed Yes   NPO Status Reinforced Yes   Ride and Caregiver Arranged N/A   Patient Knows to Bring Current Medications No   Bring Outside Films Requested No

## 2023-04-24 ENCOUNTER — HOSPITAL ENCOUNTER (OUTPATIENT)
Dept: MAMMOGRAPHY | Facility: HOSPITAL | Age: 78
Discharge: HOME | End: 2023-04-24
Payer: MEDICARE

## 2023-04-24 ENCOUNTER — HOSPITAL ENCOUNTER (OUTPATIENT)
Dept: ULTRASOUND IMAGING | Facility: HOSPITAL | Age: 78
Discharge: HOME | End: 2023-04-24
Payer: MEDICARE

## 2023-04-24 VITALS
BODY MASS INDEX: 30.55 KG/M2 | OXYGEN SATURATION: 96 % | HEART RATE: 52 BPM | DIASTOLIC BLOOD PRESSURE: 68 MMHG | WEIGHT: 166 LBS | TEMPERATURE: 97.3 F | SYSTOLIC BLOOD PRESSURE: 110 MMHG | HEIGHT: 62 IN | RESPIRATION RATE: 16 BRPM

## 2023-04-24 DIAGNOSIS — R92.8 ABNORMAL MAMMOGRAM OF LEFT BREAST: ICD-10-CM

## 2023-04-24 PROCEDURE — A4648 IMPLANTABLE TISSUE MARKER: HCPCS

## 2023-04-24 PROCEDURE — 88341 IMHCHEM/IMCYTCHM EA ADD ANTB: CPT | Performed by: FAMILY MEDICINE

## 2023-04-24 PROCEDURE — 88360 TUMOR IMMUNOHISTOCHEM/MANUAL: CPT | Performed by: FAMILY MEDICINE

## 2023-04-24 PROCEDURE — 88305 TISSUE EXAM BY PATHOLOGIST: CPT | Performed by: FAMILY MEDICINE

## 2023-04-24 PROCEDURE — 88342 IMHCHEM/IMCYTCHM 1ST ANTB: CPT | Performed by: FAMILY MEDICINE

## 2023-04-24 PROCEDURE — 77065 DX MAMMO INCL CAD UNI: CPT

## 2023-04-24 PROCEDURE — 0 LIDOCAINE 1 % SOLUTION: Performed by: RADIOLOGY

## 2023-04-24 RX ORDER — LIDOCAINE HYDROCHLORIDE 10 MG/ML
10 INJECTION, SOLUTION INFILTRATION; PERINEURAL ONCE
Status: COMPLETED | OUTPATIENT
Start: 2023-04-24 | End: 2023-04-24

## 2023-04-24 RX ADMIN — LIDOCAINE HYDROCHLORIDE 6 ML: 10; .005 INJECTION, SOLUTION EPIDURAL; INFILTRATION; INTRACAUDAL; PERINEURAL at 14:50

## 2023-04-24 RX ADMIN — LIDOCAINE HYDROCHLORIDE 2 ML: 10 INJECTION, SOLUTION INFILTRATION; PERINEURAL at 14:50

## 2023-04-24 NOTE — H&P
Name: Joi Aleman ADMIT: 2023   : 1945  PCP: Yaneth Kelly MD    MRN: [id number] LOS: 0 days   AGE/SEX: 77 y.o. female  ROOM: Room/bed info not found       Chief complaint L breast mass    Present Illness or Internal History:  Patient is a 77 y.o. female presents with L breast mass for US bx.     Past Surgical History:  Past Surgical History:   Procedure Laterality Date   • BLEPHAROPLASTY     • CARDIAC CATHETERIZATION N/A 2020    Procedure: Coronary angiography;  Surgeon: Víctor Hernandez MD;  Location:  [facility];  Service: Cardiovascular;  Laterality: N/A;   • CARDIAC CATHETERIZATION N/A 2020    Procedure: Left heart cath;  Surgeon: Víctor Hernandez MD;  Location:  [facility];  Service: Cardiovascular;  Laterality: N/A;   • CARDIAC CATHETERIZATION N/A 2020    Procedure: Left ventriculography;  Surgeon: Víctor Hernandez MD;  Location:  [facility];  Service: Cardiovascular;  Laterality: N/A;   • CARDIAC CATHETERIZATION N/A 2020    Procedure: Right heart cath with shunt run;  Surgeon: Víctor Hernandez MD;  Location:  [facility];  Service: Cardiovascular;  Laterality: N/A;   • CARPAL TUNNEL RELEASE Right    • COLON SURGERY     • COLONOSCOPY      normal less than 10 years ago   • HYSTERECTOMY     • OOPHORECTOMY     • TOE SURGERY Right     right big toe replecemnet 12 years ago   • TONSILLECTOMY     • TOTAL KNEE ARTHROPLASTY Right 2021    Procedure: TOTAL KNEE ARTHROPLASTY, with left knee steroid injection;  Surgeon: Shamar Boone MD;  Location: [facility];  Service: Orthopedics;  Laterality: Right;       Past Medical History:  Past Medical History:   Diagnosis Date   • Agatston CAC score, <100     2020: 29   • Anxiety and depression    • Arthritis    • Brain aneurysm     WATCHING   • Frequent urination    • Hyperlipidemia    • Hypertension    • Insomnia    • Knee pain, bilateral    •  Myocardial bridge     very mild, mid-LAD   • Osteoarthritis    • SOB (shortness of breath) on exertion        Home Medications:  (Not in a hospital admission)      Allergies:  Patient has no known allergies.    Family History:  Family History   Problem Relation Age of Onset   • Breast cancer Mother 76   • Heart disease Father    • Heart attack Father    • Heart disease Paternal Grandfather    • Heart attack Paternal Grandfather    • No Known Problems Sister    • No Known Problems Brother    • Prostate cancer Maternal Grandfather    • Heart disease Sister    • Malig Hyperthermia Neg Hx        Social History:  Social History     Tobacco Use   • Smoking status: Former     Packs/day: 1.50     Years: 15.00     Pack years: 22.50     Types: Cigarettes     Start date:      Quit date:      Years since quittin.3   • Smokeless tobacco: Never   Vaping Use   • Vaping Use: Never used   Substance Use Topics   • Alcohol use: Yes     Comment: once month   • Drug use: Never        Objective     Physical Exam:    No exam performed today,    Vital Signs  Temp:  [97.3 °F (36.3 °C)] 97.3 °F (36.3 °C)  Heart Rate:  [52] 52  Resp:  [16] 16  BP: (112)/(72) 112/72    Anticipated Surgical Procedure:  Left breast ultrasound guided core needle biopsy    The risks, benefits and alternatives of this procedure have been discussed with the patient or responsible party: Yes        Georgia Fernandez MD  23  14:20 EDT

## 2023-04-24 NOTE — NURSING NOTE
Biopsy site to left outer breast clear with Exofin dry and intact. No firmness or swelling noted at or around biopsy site. Denies pain. Ice pack with protective covering applied to biopsy site. Discharge instructions discussed with understanding voiced by patient. Copies provided to patient. No distress noted. To home via private vehicle.

## 2023-04-27 ENCOUNTER — PATIENT MESSAGE (OUTPATIENT)
Dept: FAMILY MEDICINE CLINIC | Facility: CLINIC | Age: 78
End: 2023-04-27
Payer: MEDICARE

## 2023-04-27 DIAGNOSIS — C50.912 INVASIVE DUCTAL CARCINOMA OF BREAST, LEFT: Primary | ICD-10-CM

## 2023-04-27 LAB
LAB AP CASE REPORT: NORMAL
LAB AP CLINICAL INFORMATION: NORMAL
LAB AP SPECIAL STAINS: NORMAL
LAB AP SYNOPTIC CHECKLIST: NORMAL
PATH REPORT.FINAL DX SPEC: NORMAL
PATH REPORT.GROSS SPEC: NORMAL

## 2023-04-27 NOTE — PROGRESS NOTES
I called to discuss results with patient.  The breast biopsy has resulted as positive for breast cancer, thankfully it is ER/VA positive, she does want to proceed forward with treatment, will place referral to both oncologist and breast surgeon.  Please let us know if you have further questions.     Thank you

## 2023-04-28 ENCOUNTER — TELEPHONE (OUTPATIENT)
Dept: GASTROENTEROLOGY | Facility: CLINIC | Age: 78
End: 2023-04-28
Payer: MEDICARE

## 2023-04-28 NOTE — TELEPHONE ENCOUNTER
Caller: Joi Aleman    Relationship to patient: Self    Best call back number: [phone]    Type of visit: COLONOSCOPY with possible interventions [[id number]      Additional notes:  PATIENT IS SCHEDULED FOR A COLONOSCOPY ON 5-16-23 WITH DR. FRANK BHATT.  PATIENT WANTS TO CANCEL APPOINTMENT AND STATED SHE JUST FOUND OUT SHE HAS CANCER.

## 2023-05-01 ENCOUNTER — TELEPHONE (OUTPATIENT)
Dept: SURGERY | Facility: CLINIC | Age: 78
End: 2023-05-01
Payer: MEDICARE

## 2023-05-01 DIAGNOSIS — C50.919 MALIGNANT NEOPLASM OF FEMALE BREAST, UNSPECIFIED ESTROGEN RECEPTOR STATUS, UNSPECIFIED LATERALITY, UNSPECIFIED SITE OF BREAST: Primary | ICD-10-CM

## 2023-05-01 DIAGNOSIS — C50.612 MALIGNANT NEOPLASM OF AXILLARY TAIL OF LEFT FEMALE BREAST, UNSPECIFIED ESTROGEN RECEPTOR STATUS: ICD-10-CM

## 2023-05-01 NOTE — TELEPHONE ENCOUNTER
SPOKE TO PT REGARDING MRI BREAST SCHEDULED ON 5/7 @ 1:30 PM, ARRIVAL 1 PM FOR [facility]. INSTRUCTED PT TO WEAR NO METAL TO THE APPT.

## 2023-05-07 ENCOUNTER — HOSPITAL ENCOUNTER (OUTPATIENT)
Dept: MRI IMAGING | Facility: HOSPITAL | Age: 78
Discharge: HOME | End: 2023-05-07
Admitting: STUDENT IN AN ORGANIZED HEALTH CARE EDUCATION/TRAINING PROGRAM
Payer: MEDICARE

## 2023-05-07 DIAGNOSIS — C50.612 MALIGNANT NEOPLASM OF AXILLARY TAIL OF LEFT FEMALE BREAST, UNSPECIFIED ESTROGEN RECEPTOR STATUS: ICD-10-CM

## 2023-05-07 DIAGNOSIS — C50.919 MALIGNANT NEOPLASM OF FEMALE BREAST, UNSPECIFIED ESTROGEN RECEPTOR STATUS, UNSPECIFIED LATERALITY, UNSPECIFIED SITE OF BREAST: ICD-10-CM

## 2023-05-07 PROCEDURE — C8908 MRI W/O FOL W/CONT, BREAST,: HCPCS

## 2023-05-07 PROCEDURE — 0 GADOBENATE DIMEGLUMINE 529 MG/ML SOLUTION: Performed by: STUDENT IN AN ORGANIZED HEALTH CARE EDUCATION/TRAINING PROGRAM

## 2023-05-07 PROCEDURE — C8937 CAD BREAST MRI: HCPCS

## 2023-05-07 PROCEDURE — 82565 ASSAY OF CREATININE: CPT

## 2023-05-07 PROCEDURE — A9577 INJ MULTIHANCE: HCPCS | Performed by: STUDENT IN AN ORGANIZED HEALTH CARE EDUCATION/TRAINING PROGRAM

## 2023-05-07 RX ADMIN — GADOBENATE DIMEGLUMINE 15 ML: 529 INJECTION, SOLUTION INTRAVENOUS at 14:02

## 2023-05-08 ENCOUNTER — PREP FOR SURGERY (OUTPATIENT)
Dept: OTHER | Facility: HOSPITAL | Age: 78
End: 2023-05-08
Payer: MEDICARE

## 2023-05-08 ENCOUNTER — PATIENT OUTREACH (OUTPATIENT)
Dept: OTHER | Facility: HOSPITAL | Age: 78
End: 2023-05-08
Payer: MEDICARE

## 2023-05-08 ENCOUNTER — OFFICE VISIT (OUTPATIENT)
Dept: SURGERY | Facility: CLINIC | Age: 78
End: 2023-05-08
Payer: MEDICARE

## 2023-05-08 ENCOUNTER — PATIENT MESSAGE (OUTPATIENT)
Dept: FAMILY MEDICINE CLINIC | Facility: CLINIC | Age: 78
End: 2023-05-08
Payer: MEDICARE

## 2023-05-08 VITALS — HEIGHT: 62 IN | BODY MASS INDEX: 30.51 KG/M2 | WEIGHT: 165.8 LBS

## 2023-05-08 DIAGNOSIS — C50.919 MALIGNANT NEOPLASM OF FEMALE BREAST, UNSPECIFIED ESTROGEN RECEPTOR STATUS, UNSPECIFIED LATERALITY, UNSPECIFIED SITE OF BREAST: Primary | ICD-10-CM

## 2023-05-08 DIAGNOSIS — F41.9 ANXIETY DUE TO INVASIVE PROCEDURE: ICD-10-CM

## 2023-05-08 LAB — CREAT BLDA-MCNC: 1.2 MG/DL (ref 0.6–1.3)

## 2023-05-08 RX ORDER — CEFAZOLIN SODIUM 2 G/100ML
2 INJECTION, SOLUTION INTRAVENOUS ONCE
OUTPATIENT
Start: 2023-05-25 | End: 2023-05-08

## 2023-05-08 RX ORDER — LIDOCAINE AND PRILOCAINE 25; 25 MG/G; MG/G
1 CREAM TOPICAL ONCE
OUTPATIENT
Start: 2023-05-25 | End: 2023-05-08

## 2023-05-08 RX ORDER — DIAZEPAM 5 MG/1
5 TABLET ORAL ONCE
OUTPATIENT
Start: 2023-05-25 | End: 2023-05-08

## 2023-05-08 NOTE — H&P (VIEW-ONLY)
General Surgery Breast Cancer History and Physical Exam     Summary:    Joi Aleman is a 77 y.o. 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      A multidisciplinary plan has been formulated for the patient:    (1) Breast Surgical Oncology:  -Follow up [employer] 9 panel genetic testing. I will call her with results.   -MRI to evaluate anatomy as well as for surgical planning.   -Nurse navigator consult.   -Surgical plan: She would like to proceed with left breast wire localized lumpectomy with sentinel lymph node biopsy.  -Plastic surgery referral deferred at this time.    (2) Medical Oncology:  -Appointment with Dr. Hunter.     (3) Radiation Oncology:  -Will refer postoperatively for evaluation for radiation therapy.    Referring Provider: Yaneth Kelly MD    Chief Complaint: abnormal breast imaging    History of Present Illness: Ms. Joi Aleman is a 77 y.o. year old lady, seen at the request of Yaneth Kelly MD for a new diagnosis of left breast cancer.      This was initially detected as an imaging abnormality. She has not had a mammogram for a few years. She denies any prior history of abnormal mammograms or breast biopsies. Her work-up is detailed in the oncologic history below.     She denies any breast lumps, pain, skin changes, or nipple discharge. She has a family history of breast cancer in her mother (age 77). She denies any family history of ovarian cancer.     Workup of Current Diagnosis:    2/22/2023 Bilateral Screening Mammogram:  IMPRESSION/RECOMMENDATION(S):  Left breast focal asymmetry with questioned architectural distortion. Recommend evaluation with CC and MLO spot compression and lateral views with Tomosynthesis and targeted ultrasound.  No mammographic evidence of malignancy in the right breast.  BI-RADS Category 0: Incomplete    3/29/2023 Left Breast Diagnostic Mammogram with Ultrasound:    FINDINGS: Digital spot compression CC mammographic and tomosynthesis images of the left breast were obtained. Comparison is made to prior mammography dated 02/22/2023 and 07/14/2020. In the middle third upper outer quadrant of the left breast there is a 0.4 cm irregular mass with associated surrounding mild architectural distortion.  ULTRASOUND: Targeted sonographic evaluation of the upper outer quadrant of the left breast was performed. At the 2 o'clock position on the order of 5 cm from the nipple there is a 1.1 cm cluster of cysts. At the 2:30 position on the order of 5 cm from the nipple there is a 0.87 cm cluster of cysts. At the 2 o'clock position on the order of 8 cm from the nipple there is a 0.4 cm ill-defined hypoechoic mass. This corresponds to the mammographically visualized mass.   IMPRESSION:  There is a 0.4 cm hypoechoic mass with ill-defined margins seen at the 2 o'clock position on the order of 8 cm from the nipple in the left breast. Correlation with an ultrasound-guided left breast  biopsy is recommended.    BI-RADS CATEGORY 4: Suspicious abnormality. Biopsy should be considered.    4/24/2023 Left Breast US Guided Biopsy:   PROCEDURE: The mass at 2:00, 8 cm from the nipple in the left breast was targeted under ultrasound. The skin of the breast was cleansed and prepped. 2 mL of 1% lidocaine and 6 mL of 1% lidocaine with epinephrine were used for local anesthesia. A small skin incision was then made to permit passage of a 10 g needle with a vacuum-assisted biopsy device. 4 core specimens were obtained. A bowtie clip was then deployed at the biopsy site. The patient tolerated the procedure well with no immediate complications.  Post-procedural digital mammographic views of the left breast demonstrate the bowtie clip at the expected location at the site of biopsy in the upper outer middle left breast.  IMPRESSION:  1. Ultrasound-guided core needle biopsy of a 0.4 cm mass at 2:00, 8 cm from the  nipple in the left breast, marked with a bowtie clip. Pathology is malignant and concordant with the imaging assessment. Recommend surgical consultation.    4/24/2023 Pathology:   Final Diagnosis   1. Left Breast, 2:00, 8 cm FN, U/S-Guided Core Needle Biopsy for a Mass:                 A. INVASIVE DUCTAL CARCINOMA, Well differentiated; [place] Histologic Grade I/III       (tubule score = 2, nuclear score = 1, mitoses score = 1), measuring at least 4 mm.               B. Associated atypical ductal hyperplasia and focal atypical lobular hyperplasia.               C. Negative for lymphovascular space invasion.               D. See biomarker template.     5/1/2023 Bilateral Breast MRI   IMPRESSION:  1. Biopsy-proven malignancy in the left breast at the 2-o'clock position represented by the bowtie-shaped metallic clip within a 1.5 cm postbiopsy cavity. No suspicious residual enhancement is seen at this location or in other areas of the left breast and there is no evidence for left axillary adenopathy.  2. There are no findings suspicious for malignancy in the right breast.  BI-RADS category 6: Known biopsy-proven malignancy.    Past Medical History:   HLD  HTN    Past Surgical History:    • Blepharoplasty   • Cardiac cath  • Right carpal tunnel release   • Hysterectomy and oophorectomy   • Right toe surgery   • Tonsillectomy   • R total knee arthroscopy    Family History:    • Maternal grandfather with prostate cancer     Social History:  • Denies tobacco use  • Occasional alcohol use    Allergies:   No Known Allergies    Medications:     Current Outpatient Medications:   •  amLODIPine (NORVASC) 10 MG tablet, TAKE 1 TABLET EVERY DAY (Patient taking differently: Take 5 mg by mouth Daily.), Disp: 90 tablet, Rfl: 1  •  aspirin 81 MG EC tablet, Take 1 tablet twice daily x 14 days; then take 1 daily x 28 days (Patient not taking: Reported on 4/12/2023), Disp: 60 tablet, Rfl: 0  •  atorvastatin (LIPITOR) 20 MG tablet, TAKE  1 TABLET EVERY DAY, Disp: 90 tablet, Rfl: 1  •  cephalexin (KEFLEX) 500 MG capsule, Take all 4 capsules one hour prior to procedure (Patient not taking: Reported on 4/24/2023), Disp: 4 capsule, Rfl: 5  •  diazePAM (Valium) 2 MG tablet, Take 1 tablet by mouth 2 (Two) Times a Day As Needed for Anxiety., Disp: 1 tablet, Rfl: 0  •  hydroCHLOROthiazide (MICROZIDE) 12.5 MG capsule, Take 1 capsule by mouth Daily., Disp: 90 capsule, Rfl: 3  •  loperamide (IMODIUM) 2 MG capsule, TAKE 4 CAPSULES BY MOUTH DAILY., Disp: 360 capsule, Rfl: 1  •  magnesium oxide (MAG-OX) 400 tablet tablet, Take 1 tablet by mouth Daily., Disp: , Rfl:   •  metoprolol tartrate (LOPRESSOR) 25 MG tablet, TAKE 1 TABLET TWICE DAILY, Disp: 180 tablet, Rfl: 1  •  oxybutynin (DITROPAN) 5 MG tablet, Take 1 tablet by mouth 2 (Two) Times a Day. Urge urination, Disp: 180 tablet, Rfl: 2  •  sertraline (ZOLOFT) 100 MG tablet, TAKE 2 TABLETS EVERY DAY, Disp: 180 tablet, Rfl: 1  •  traZODone (DESYREL) 100 MG tablet, TAKE 1 TABLET EVERY NIGHT, Disp: 90 tablet, Rfl: 1  No current facility-administered medications for this visit.    Laboratory Values:    Labs from 1/10/2023 reviewed    Review of Systems:   Influenza-like illness: no fever, no  cough, no  sore throat, no  body aches, no loss of sense of taste or smell, no known exposure to person with Covid-19.  Constitutional: Negative for fevers or chills  HENT: Negative for hearing loss or runny nose  Eyes: Negative for vision changes or scleral icterus  Respiratory: Negative for cough or shortness of breath  Cardiovascular: Negative for chest pain or heart palpitations  Gastrointestinal: Negative for abdominal pain, nausea, vomiting, constipation, melena, or hematochezia  Genitourinary: Negative for hematuria or dysuria  Musculoskeletal: Negative for joint swelling or gait instability  Neurologic: Negative for tremors or seizures  Psychiatric: Negative for suicidal ideations or depression  All other systems  reviewed and negative    Physical Exam:   • ECO - Asymptomatic  • Constitutional: Well-developed well-nourished, no acute distress  • Eyes: Conjunctiva normal, sclera nonicteric  • ENMT: Hearing grossly normal, oral mucosa moist  • Neck: Supple, no palpable mass, trachea midline  • Respiratory: Clear to auscultation, normal inspiratory effort  • Cardiovascular: Regular rate, no peripheral edema, no jugular venous distention  • Breast: symmetric  o Right: No visible abnormalities on inspection while seated, with arms raised or hands on hips. No masses, skin changes, or nipple abnormalities.  o Left: No visible abnormalities on inspection while seated, with arms raised or hands on hips. No masses, skin changes, or nipple abnormalities.  o Biopsy site appreciated in left breast, otherwise no skin changes.   o No clinical chest wall involvement.  • Gastrointestinal: Soft, nontender  • Lymphatics (palpable nodes): No cervical, supraclavicular or axillary lymphadenopathy  • Skin:  Warm, dry, no rash on visualized skin surfaces  • Musculoskeletal: Symmetric strength, normal gait  • Psychiatric: Alert and oriented ×3, normal affect     Discussion:  I had an extensive discussion with the patient and her family about the nature of her breast cancer diagnosis. We reviewed the components of breast tissue including ducts and lobules. We reviewed her pathology report in detail. We reviewed breast cancer histology, including stage, grade, ER/LA receptors, HER2 receptors and how this applies to her diagnosis. We reviewed the basics of systemic and local/regional management of breast cancer.     We discussed that most breast cancer is not hereditary, however given her family history, this may play a role in her case. I believe genetic testing is warranted and could affect surgical decision making.     We reviewed potential surgical treatments to include partial mastectomy, mastectomy, sentinel lymph node biopsy and axillary node  dissection and discussed the rationale associated with each approach. Regarding radiation therapy, we discussed that radiation is indicated in all cases of breast conservation and in only limited circumstances following mastectomy. We discussed that the primary goal of adjuvant radiation is to decrease the likelihood of local recurrence.     In her case, she is a good candidate for lumpectomy and she would like to proceed with breast conservation. We discussed that lumpectomy would require preoperative wire-localization. We also discussed the risk of positive margins and that she must have negative margins for lumpectomy to be an appropriate oncologic procedure. I will make every effort to obtain negative margins at her initial operation, but there is a 10-15% chance that she will require a second operation for re-excision, or possibly a total mastectomy. We will not know the margin status until after her final pathology has returned.     We discussed axillary staging. I described the procedure for sentinel lymph node biopsy in detail, including the preoperative injection of technetium sulfur colloid and intraoperative injection of lymphazurin blue dye. I explained that this is a mapping test and not a cancer test, that all of the lymph nodes containing these dyes will be removed for complete testing by pathology, and that the results could impact the decision for adjuvant treatment or additional surgery.    I described additional risks and potential complications associated with surgery, including, but not limited to, bleeding, infection, complications related to blue dye, lymphedema, deformity/poor cosmetic result, chronic pain, neurovascular injury, numbness, seroma, hematoma, deep venous thrombosis, skin flap necrosis, disease recurrence and the possibility of requiring additional surgery. We also discussed other treatment options including the option of not undergoing any surgical treatment and the risks  associated with this including disease progression. She expressed an understanding of these factors and wished to proceed.    We discussed that in her case, systemic treatment would involve endocrine therapy and possibly chemotherapy. She will be referred to medical oncology postoperatively to discuss this further.     RANDY HAWKINS M.D.  General and Endoscopic Surgery  [facility]    [address], [address]  [place], [place], [address]  P: [phone]  F: [phone]

## 2023-05-08 NOTE — PROGRESS NOTES
Referral received from Dr. Allen's office. Met MsRosanna Aleman during her surgery consult. I introduced myself and navigational services. She has a good understanding of her pathology and treatment options presented to her by Dr. Allen and was able to verbalize teach back. After the consult she is leaning toward having a lumpectomy. She is comfortable with this plan and has no questions or concerns following the consult.      She stated she receives support from her daughter and friends, but does not like to burden people. She lives alone with no verbalized needs at this time.       She stated she has no financial or transportation concerns at this time. She has no resource needs or ongoing concerns at this time.      She stated she is feeling better after her consult with Dr. Allen but has had moments of anxiety through this process. We discussed we have support options if the need arises. She was thankful for the information.      We discussed integrative therapies and other [facility]. She received a navigation folder with the following information:     [facility], Cancer and Restorative Exercise (CARE), [employer] Exercise program, Guide for the Newly Diagnosed, [employer], [facility], Massage Therapy, Reiki Therapy, [employer] [place], Cancer Nutrition, [facility] Cancer Support [facility] and Survivorship Clinic.     She verbalized appreciation for navigational services and she has my contact information and will call with any questions that arise.

## 2023-05-08 NOTE — PROGRESS NOTES
General Surgery Breast Cancer History and Physical Exam     Summary:    Joi Aleman is a 77 y.o. lady who presents with a new diagnosis of left breast invasive ductal carcinoma: Grade I,  ER+/NJ+, Her2-, Ki67 10%; [id number], Stage I.      A multidisciplinary plan has been formulated for the patient:    (1) Breast Surgical Oncology:  -Follow up [employer] 9 panel genetic testing. I will call her with results.   -MRI to evaluate anatomy as well as for surgical planning.   -Nurse navigator consult.   -Surgical plan: She would like to proceed with left breast wire localized lumpectomy with sentinel lymph node biopsy.  -Plastic surgery referral deferred at this time.    (2) Medical Oncology:  -Appointment with Dr. Hunter.     (3) Radiation Oncology:  -Will refer postoperatively for evaluation for radiation therapy.    Referring Provider: Yaneth Kelly MD    Chief Complaint: abnormal breast imaging    History of Present Illness: Ms. Joi Aleman is a 77 y.o. year old lady, seen at the request of Yaneth Kelly MD for a new diagnosis of left breast cancer.      This was initially detected as an imaging abnormality. She has not had a mammogram for a few years. She denies any prior history of abnormal mammograms or breast biopsies. Her work-up is detailed in the oncologic history below.     She denies any breast lumps, pain, skin changes, or nipple discharge. She has a family history of breast cancer in her mother (age 77). She denies any family history of ovarian cancer.     Workup of Current Diagnosis:    2/22/2023 Bilateral Screening Mammogram:  IMPRESSION/RECOMMENDATION(S):  Left breast focal asymmetry with questioned architectural distortion. Recommend evaluation with CC and MLO spot compression and lateral views with Tomosynthesis and targeted ultrasound.  No mammographic evidence of malignancy in the right breast.  BI-RADS Category 0: Incomplete    3/29/2023 Left Breast Diagnostic Mammogram with Ultrasound:    FINDINGS: Digital spot compression CC mammographic and tomosynthesis images of the left breast were obtained. Comparison is made to prior mammography dated 02/22/2023 and 07/14/2020. In the middle third upper outer quadrant of the left breast there is a 0.4 cm irregular mass with associated surrounding mild architectural distortion.  ULTRASOUND: Targeted sonographic evaluation of the upper outer quadrant of the left breast was performed. At the 2 o'clock position on the order of 5 cm from the nipple there is a 1.1 cm cluster of cysts. At the 2:30 position on the order of 5 cm from the nipple there is a 0.87 cm cluster of cysts. At the 2 o'clock position on the order of 8 cm from the nipple there is a 0.4 cm ill-defined hypoechoic mass. This corresponds to the mammographically visualized mass.   IMPRESSION:  There is a 0.4 cm hypoechoic mass with ill-defined margins seen at the 2 o'clock position on the order of 8 cm from the nipple in the left breast. Correlation with an ultrasound-guided left breast  biopsy is recommended.    BI-RADS CATEGORY 4: Suspicious abnormality. Biopsy should be considered.    4/24/2023 Left Breast US Guided Biopsy:   PROCEDURE: The mass at 2:00, 8 cm from the nipple in the left breast was targeted under ultrasound. The skin of the breast was cleansed and prepped. 2 mL of 1% lidocaine and 6 mL of 1% lidocaine with epinephrine were used for local anesthesia. A small skin incision was then made to permit passage of a 10 g needle with a vacuum-assisted biopsy device. 4 core specimens were obtained. A bowtie clip was then deployed at the biopsy site. The patient tolerated the procedure well with no immediate complications.  Post-procedural digital mammographic views of the left breast demonstrate the bowtie clip at the expected location at the site of biopsy in the upper outer middle left breast.  IMPRESSION:  1. Ultrasound-guided core needle biopsy of a 0.4 cm mass at 2:00, 8 cm from the  nipple in the left breast, marked with a bowtie clip. Pathology is malignant and concordant with the imaging assessment. Recommend surgical consultation.    4/24/2023 Pathology:   Final Diagnosis   1. Left Breast, 2:00, 8 cm FN, U/S-Guided Core Needle Biopsy for a Mass:                 A. INVASIVE DUCTAL CARCINOMA, Well differentiated; Lawrence Histologic Grade I/III       (tubule score = 2, nuclear score = 1, mitoses score = 1), measuring at least 4 mm.               B. Associated atypical ductal hyperplasia and focal atypical lobular hyperplasia.               C. Negative for lymphovascular space invasion.               D. See biomarker template.     5/1/2023 Bilateral Breast MRI   IMPRESSION:  1. Biopsy-proven malignancy in the left breast at the 2-o'clock position represented by the bowtie-shaped metallic clip within a 1.5 cm postbiopsy cavity. No suspicious residual enhancement is seen at this location or in other areas of the left breast and there is no evidence for left axillary adenopathy.  2. There are no findings suspicious for malignancy in the right breast.  BI-RADS category 6: Known biopsy-proven malignancy.    Past Medical History:   HLD  HTN    Past Surgical History:    • Blepharoplasty   • Cardiac cath  • Right carpal tunnel release   • Hysterectomy and oophorectomy   • Right toe surgery   • Tonsillectomy   • R total knee arthroscopy    Family History:    • Maternal grandfather with prostate cancer     Social History:  • Denies tobacco use  • Occasional alcohol use    Allergies:   No Known Allergies    Medications:     Current Outpatient Medications:   •  amLODIPine (NORVASC) 10 MG tablet, TAKE 1 TABLET EVERY DAY (Patient taking differently: Take 5 mg by mouth Daily.), Disp: 90 tablet, Rfl: 1  •  aspirin 81 MG EC tablet, Take 1 tablet twice daily x 14 days; then take 1 daily x 28 days (Patient not taking: Reported on 4/12/2023), Disp: 60 tablet, Rfl: 0  •  atorvastatin (LIPITOR) 20 MG tablet, TAKE  1 TABLET EVERY DAY, Disp: 90 tablet, Rfl: 1  •  cephalexin (KEFLEX) 500 MG capsule, Take all 4 capsules one hour prior to procedure (Patient not taking: Reported on 4/24/2023), Disp: 4 capsule, Rfl: 5  •  diazePAM (Valium) 2 MG tablet, Take 1 tablet by mouth 2 (Two) Times a Day As Needed for Anxiety., Disp: 1 tablet, Rfl: 0  •  hydroCHLOROthiazide (MICROZIDE) 12.5 MG capsule, Take 1 capsule by mouth Daily., Disp: 90 capsule, Rfl: 3  •  loperamide (IMODIUM) 2 MG capsule, TAKE 4 CAPSULES BY MOUTH DAILY., Disp: 360 capsule, Rfl: 1  •  magnesium oxide (MAG-OX) 400 tablet tablet, Take 1 tablet by mouth Daily., Disp: , Rfl:   •  metoprolol tartrate (LOPRESSOR) 25 MG tablet, TAKE 1 TABLET TWICE DAILY, Disp: 180 tablet, Rfl: 1  •  oxybutynin (DITROPAN) 5 MG tablet, Take 1 tablet by mouth 2 (Two) Times a Day. Urge urination, Disp: 180 tablet, Rfl: 2  •  sertraline (ZOLOFT) 100 MG tablet, TAKE 2 TABLETS EVERY DAY, Disp: 180 tablet, Rfl: 1  •  traZODone (DESYREL) 100 MG tablet, TAKE 1 TABLET EVERY NIGHT, Disp: 90 tablet, Rfl: 1  No current facility-administered medications for this visit.    Laboratory Values:    Labs from 1/10/2023 reviewed    Review of Systems:   Influenza-like illness: no fever, no  cough, no  sore throat, no  body aches, no loss of sense of taste or smell, no known exposure to person with Covid-19.  Constitutional: Negative for fevers or chills  HENT: Negative for hearing loss or runny nose  Eyes: Negative for vision changes or scleral icterus  Respiratory: Negative for cough or shortness of breath  Cardiovascular: Negative for chest pain or heart palpitations  Gastrointestinal: Negative for abdominal pain, nausea, vomiting, constipation, melena, or hematochezia  Genitourinary: Negative for hematuria or dysuria  Musculoskeletal: Negative for joint swelling or gait instability  Neurologic: Negative for tremors or seizures  Psychiatric: Negative for suicidal ideations or depression  All other systems  reviewed and negative    Physical Exam:   • ECO - Asymptomatic  • Constitutional: Well-developed well-nourished, no acute distress  • Eyes: Conjunctiva normal, sclera nonicteric  • ENMT: Hearing grossly normal, oral mucosa moist  • Neck: Supple, no palpable mass, trachea midline  • Respiratory: Clear to auscultation, normal inspiratory effort  • Cardiovascular: Regular rate, no peripheral edema, no jugular venous distention  • Breast: symmetric  o Right: No visible abnormalities on inspection while seated, with arms raised or hands on hips. No masses, skin changes, or nipple abnormalities.  o Left: No visible abnormalities on inspection while seated, with arms raised or hands on hips. No masses, skin changes, or nipple abnormalities.  o Biopsy site appreciated in left breast, otherwise no skin changes.   o No clinical chest wall involvement.  • Gastrointestinal: Soft, nontender  • Lymphatics (palpable nodes): No cervical, supraclavicular or axillary lymphadenopathy  • Skin:  Warm, dry, no rash on visualized skin surfaces  • Musculoskeletal: Symmetric strength, normal gait  • Psychiatric: Alert and oriented ×3, normal affect     Discussion:  I had an extensive discussion with the patient and her family about the nature of her breast cancer diagnosis. We reviewed the components of breast tissue including ducts and lobules. We reviewed her pathology report in detail. We reviewed breast cancer histology, including stage, grade, ER/TX receptors, HER2 receptors and how this applies to her diagnosis. We reviewed the basics of systemic and local/regional management of breast cancer.     We discussed that most breast cancer is not hereditary, however given her family history, this may play a role in her case. I believe genetic testing is warranted and could affect surgical decision making.     We reviewed potential surgical treatments to include partial mastectomy, mastectomy, sentinel lymph node biopsy and axillary node  dissection and discussed the rationale associated with each approach. Regarding radiation therapy, we discussed that radiation is indicated in all cases of breast conservation and in only limited circumstances following mastectomy. We discussed that the primary goal of adjuvant radiation is to decrease the likelihood of local recurrence.     In her case, she is a good candidate for lumpectomy and she would like to proceed with breast conservation. We discussed that lumpectomy would require preoperative wire-localization. We also discussed the risk of positive margins and that she must have negative margins for lumpectomy to be an appropriate oncologic procedure. I will make every effort to obtain negative margins at her initial operation, but there is a 10-15% chance that she will require a second operation for re-excision, or possibly a total mastectomy. We will not know the margin status until after her final pathology has returned.     We discussed axillary staging. I described the procedure for sentinel lymph node biopsy in detail, including the preoperative injection of technetium sulfur colloid and intraoperative injection of lymphazurin blue dye. I explained that this is a mapping test and not a cancer test, that all of the lymph nodes containing these dyes will be removed for complete testing by pathology, and that the results could impact the decision for adjuvant treatment or additional surgery.    I described additional risks and potential complications associated with surgery, including, but not limited to, bleeding, infection, complications related to blue dye, lymphedema, deformity/poor cosmetic result, chronic pain, neurovascular injury, numbness, seroma, hematoma, deep venous thrombosis, skin flap necrosis, disease recurrence and the possibility of requiring additional surgery. We also discussed other treatment options including the option of not undergoing any surgical treatment and the risks  associated with this including disease progression. She expressed an understanding of these factors and wished to proceed.    We discussed that in her case, systemic treatment would involve endocrine therapy and possibly chemotherapy. She will be referred to medical oncology postoperatively to discuss this further.     RANDY HAWKINS M.D.  General and Endoscopic Surgery  [facility]    [address], [address]  [place], [place], [address]  P: [phone]  F: [phone]

## 2023-05-09 ENCOUNTER — PATIENT OUTREACH (OUTPATIENT)
Dept: OTHER | Facility: HOSPITAL | Age: 78
End: 2023-05-09
Payer: MEDICARE

## 2023-05-09 PROBLEM — C50.919 MALIGNANT NEOPLASM OF FEMALE BREAST: Status: ACTIVE | Noted: 2023-05-09

## 2023-05-09 RX ORDER — DIAZEPAM 2 MG/1
2 TABLET ORAL 2 TIMES DAILY PRN
Qty: 2 TABLET | Refills: 0 | Status: SHIPPED | OUTPATIENT
Start: 2023-05-09

## 2023-05-09 NOTE — PROGRESS NOTES
Ms. Aleman called with questions about what a JENNY was. We discussed this process and she was thankful for the help. She stated her best friend had a stroke last night and the father of her children passed away this morning. We discussed this and allowed room for patient to verbalize feelings and needs. She stated she is doing ok at this time. Offered support options and she politely declined. She will reach out with any other questions or concerns that arise.

## 2023-05-09 NOTE — TELEPHONE ENCOUNTER
From: Joi Aleman  To: Yaneth Kelly  Sent: 5/8/2023 5:47 PM EDT  Subject: Dr Allen    Love her and feel very good and safe about everything!! Just thought I'd let you know.  Thank you  Joi

## 2023-05-10 NOTE — PROGRESS NOTES
05/18/23 0001   Pre-Procedure Phone Call   Procedure Time Verified Yes   Arrival Time 0830   Procedure Location Verified Yes   Medical History Reviewed Yes   NPO Status Reinforced No   Ride and Caregiver Arranged N/A   Patient Knows to Bring Current Medications No   Bring Outside Films Requested No

## 2023-05-16 DIAGNOSIS — K58.0 IRRITABLE BOWEL SYNDROME WITH DIARRHEA: ICD-10-CM

## 2023-05-16 RX ORDER — LOPERAMIDE HYDROCHLORIDE 2 MG/1
8 CAPSULE ORAL DAILY
Qty: 360 CAPSULE | Refills: 1 | Status: SHIPPED | OUTPATIENT
Start: 2023-05-16

## 2023-05-16 RX ORDER — HYDROCHLOROTHIAZIDE 12.5 MG/1
CAPSULE, GELATIN COATED ORAL
Qty: 90 CAPSULE | Refills: 1 | Status: SHIPPED | OUTPATIENT
Start: 2023-05-16

## 2023-05-18 ENCOUNTER — PRE-ADMISSION TESTING (OUTPATIENT)
Dept: PREADMISSION TESTING | Facility: HOSPITAL | Age: 78
End: 2023-05-18
Payer: MEDICARE

## 2023-05-18 ENCOUNTER — HOSPITAL ENCOUNTER (OUTPATIENT)
Dept: MAMMOGRAPHY | Facility: HOSPITAL | Age: 78
Discharge: HOME | End: 2023-05-18
Payer: MEDICARE

## 2023-05-18 ENCOUNTER — HOSPITAL ENCOUNTER (OUTPATIENT)
Dept: ULTRASOUND IMAGING | Facility: HOSPITAL | Age: 78
Discharge: HOME | End: 2023-05-18
Payer: MEDICARE

## 2023-05-18 VITALS
TEMPERATURE: 96.9 F | WEIGHT: 170 LBS | DIASTOLIC BLOOD PRESSURE: 61 MMHG | HEART RATE: 55 BPM | SYSTOLIC BLOOD PRESSURE: 130 MMHG | RESPIRATION RATE: 17 BRPM | HEIGHT: 62 IN | OXYGEN SATURATION: 97 % | BODY MASS INDEX: 31.28 KG/M2

## 2023-05-18 VITALS
HEIGHT: 62 IN | BODY MASS INDEX: 31.28 KG/M2 | SYSTOLIC BLOOD PRESSURE: 103 MMHG | WEIGHT: 170 LBS | RESPIRATION RATE: 16 BRPM | HEART RATE: 48 BPM | DIASTOLIC BLOOD PRESSURE: 59 MMHG | OXYGEN SATURATION: 97 % | TEMPERATURE: 97 F

## 2023-05-18 DIAGNOSIS — C50.919 MALIGNANT NEOPLASM OF FEMALE BREAST, UNSPECIFIED ESTROGEN RECEPTOR STATUS, UNSPECIFIED LATERALITY, UNSPECIFIED SITE OF BREAST: ICD-10-CM

## 2023-05-18 LAB
ANION GAP SERPL CALCULATED.3IONS-SCNC: 6.8 MMOL/L (ref 5–15)
BUN SERPL-MCNC: 16 MG/DL (ref 8–23)
BUN/CREAT SERPL: 14.3 (ref 7–25)
CALCIUM SPEC-SCNC: 9.9 MG/DL (ref 8.6–10.5)
CHLORIDE SERPL-SCNC: 99 MMOL/L (ref 98–107)
CO2 SERPL-SCNC: 31.2 MMOL/L (ref 22–29)
CREAT SERPL-MCNC: 1.12 MG/DL (ref 0.57–1)
DEPRECATED RDW RBC AUTO: 41.9 FL (ref 37–54)
EGFRCR SERPLBLD CKD-EPI 2021: 51.4 ML/MIN/1.73
ERYTHROCYTE [DISTWIDTH] IN BLOOD BY AUTOMATED COUNT: 12.9 % (ref 12.3–15.4)
GLUCOSE SERPL-MCNC: 109 MG/DL (ref 65–99)
HCT VFR BLD AUTO: 36.3 % (ref 34–46.6)
HGB BLD-MCNC: 12.1 G/DL (ref 12–15.9)
MCH RBC QN AUTO: 30.3 PG (ref 26.6–33)
MCHC RBC AUTO-ENTMCNC: 33.3 G/DL (ref 31.5–35.7)
MCV RBC AUTO: 90.8 FL (ref 79–97)
PLATELET # BLD AUTO: 212 10*3/MM3 (ref 140–450)
PMV BLD AUTO: 9.1 FL (ref 6–12)
POTASSIUM SERPL-SCNC: 3.6 MMOL/L (ref 3.5–5.2)
QT INTERVAL: 500 MS
RBC # BLD AUTO: 4 10*6/MM3 (ref 3.77–5.28)
SODIUM SERPL-SCNC: 137 MMOL/L (ref 136–145)
WBC NRBC COR # BLD: 5.86 10*3/MM3 (ref 3.4–10.8)

## 2023-05-18 PROCEDURE — C1728 CATH, BRACHYTX SEED ADM: HCPCS

## 2023-05-18 PROCEDURE — 36415 COLL VENOUS BLD VENIPUNCTURE: CPT

## 2023-05-18 PROCEDURE — 80048 BASIC METABOLIC PNL TOTAL CA: CPT

## 2023-05-18 PROCEDURE — 77065 DX MAMMO INCL CAD UNI: CPT

## 2023-05-18 PROCEDURE — 0 LIDOCAINE 1 % SOLUTION: Performed by: RADIOLOGY

## 2023-05-18 PROCEDURE — 93005 ELECTROCARDIOGRAM TRACING: CPT

## 2023-05-18 PROCEDURE — 85027 COMPLETE CBC AUTOMATED: CPT

## 2023-05-18 RX ORDER — LIDOCAINE HYDROCHLORIDE 10 MG/ML
10 INJECTION, SOLUTION INFILTRATION; PERINEURAL ONCE
Status: COMPLETED | OUTPATIENT
Start: 2023-05-18 | End: 2023-05-18

## 2023-05-18 RX ADMIN — LIDOCAINE HYDROCHLORIDE 4 ML: 10 INJECTION, SOLUTION INFILTRATION; PERINEURAL at 10:06

## 2023-05-18 NOTE — H&P
Name: Joi Aleman ADMIT: 2023   : 1945  PCP: Yaneth Kelly MD    MRN: [id number] LOS: 0 days   AGE/SEX: 77 y.o. female  ROOM: Room/bed info not found       Chief complaint L breast malign    Present Illness or Internal History:  Patient is a 77 y.o. female presents with L breast malign for preop US JENNY loc.     Past Surgical History:  Past Surgical History:   Procedure Laterality Date   • BLEPHAROPLASTY     • BREAST BIOPSY Left 2023   • CARDIAC CATHETERIZATION N/A 2020    Procedure: Coronary angiography;  Surgeon: Víctor Hernandez MD;  Location:  [facility];  Service: Cardiovascular;  Laterality: N/A;   • CARDIAC CATHETERIZATION N/A 2020    Procedure: Left heart cath;  Surgeon: Víctor Hernandez MD;  Location:  [facility]VAS[facility];  Service: Cardiovascular;  Laterality: N/A;   • CARDIAC CATHETERIZATION N/A 2020    Procedure: Left ventriculography;  Surgeon: Víctor Hernandez MD;  Location:  [facility] INVAS[facility];  Service: Cardiovascular;  Laterality: N/A;   • CARDIAC CATHETERIZATION N/A 2020    Procedure: Right heart cath with shunt run;  Surgeon: Víctor Hernandez MD;  Location: [facility] CATH [facility];  Service: Cardiovascular;  Laterality: N/A;   • CARPAL TUNNEL RELEASE Right    • COLON SURGERY      COLON RESECTION FOR ENDOMETROSIS   • COLONOSCOPY      normal less than 10 years ago   • EYE SURGERY      Cataracts   • HYSTERECTOMY     • OOPHORECTOMY     • TOE SURGERY Right     right big toe replecemnet 12 years ago   • TONSILLECTOMY     • TOTAL KNEE ARTHROPLASTY Right 2021    Procedure: TOTAL KNEE ARTHROPLASTY, with left knee steroid injection;  Surgeon: Shamar Boone MD;  Location: [facility] MAIN OR;  Service: Orthopedics;  Laterality: Right;       Past Medical History:  Past Medical History:   Diagnosis Date   • Agatston CAC score, <100     2020: 29   • Anxiety and depression    • Arthritis    • Brain  aneurysm     FOLLOWED AND THEN RELEASED HER YEARS AGO-NO ISSUES CURRENLTY   • Breast cancer in female     LEFT   • Frequent urination    • Hyperlipidemia    • Hypertension    • Insomnia    • Knee pain, bilateral     AT TIMES   • Memory change     SINCE STROKE. UNABLE TO REMEBER LIST OF INSTUCTIONS   • Myocardial bridge     very mild, mid-LAD   • Osteoarthritis    • SOB (shortness of breath) on exertion    • Stroke Years ago    About 8 to 10 years ago       Home Medications:  (Not in a hospital admission)      Allergies:  Patient has no known allergies.    Family History:  Family History   Problem Relation Age of Onset   • Breast cancer Mother 76   • Arthritis Mother    • Hypertension Mother    • Heart disease Father         And his father   • Heart attack Father    • Hyperlipidemia Father    • No Known Problems Sister    • Heart disease Sister    • No Known Problems Brother    • Prostate cancer Maternal Grandfather    • Heart disease Paternal Grandfather    • Heart attack Paternal Grandfather    • Malig Hyperthermia Neg Hx        Social History:  Social History     Tobacco Use   • Smoking status: Former     Packs/day: 1.50     Years: 15.00     Pack years: 22.50     Types: Cigarettes     Start date: 1964     Quit date: 1977     Years since quittin.3   • Smokeless tobacco: Never   Vaping Use   • Vaping Use: Never used   Substance Use Topics   • Alcohol use: Yes     Comment: Once a month   • Drug use: Never        Objective     Physical Exam:    No exam performed today,    Vital Signs  Temp:  [96.9 °F (36.1 °C)-97 °F (36.1 °C)] 96.9 °F (36.1 °C)  Heart Rate:  [48-49] 49  Resp:  [16] 16  BP: (103-120)/(52-59) 120/52    Anticipated Surgical Procedure:  Left breast ultrasound guided preoperative JENNY  localization    The risks, benefits and alternatives of this procedure have been discussed with the patient or responsible party: Yes        Georgia Fernandez MD  23  09:43 EDT

## 2023-05-18 NOTE — NURSING NOTE
Patient returned from JENNY insertion procedure. Band-Aid dry and intact to insertion site at left outer breast. Band-Aid also dry and intact over the radiologist's JENNY location marking next to insertion site.   No firmness of swelling noted. Patient aware martell must remain on skin until surgery. No s/s distress or c/o pain. Ice provided. Instructions reviewed. Patient ambulatory to Entrance A for discharge.

## 2023-05-18 NOTE — DISCHARGE INSTRUCTIONS
Take the following medications the morning of surgery:AMLODIPINE, METOPROLOL      If you are on prescription narcotic pain medication to control your pain you may also take that medication the morning of surgery.    General Instructions:  Do not eat solid food after midnight the night before surgery.  You may drink clear liquids day of surgery but must stop at least one hour before your hospital arrival time.  It is beneficial for you to have a clear drink that contains carbohydrates the day of surgery.  We suggest a 12 to 20 ounce bottle of Gatorade or Powerade for non-diabetic patients or a 12 to 20 ounce bottle of G2 or Powerade Zero for diabetic patients.     Clear liquids are liquids you can see through.  Nothing red in color.     Plain water                               Sports drinks  Sodas                                   Gelatin (Jell-O)  Fruit juices without pulp such as white grape juice and apple juice  Popsicles that contain no fruit or yogurt  Tea or coffee (no cream or milk added)  Gatorade / Powerade  G2 / Powerade Zero    If applicable bring your C-PAP/ BI-PAP machine in with you to preop day of surgery.  Bring any papers given to you in the doctor’s office.  Wear clean comfortable clothes.  Do not wear contact lenses, false eyelashes or make-up.  Bring a case for your glasses.   Remove all piercings.  Leave jewelry and any other valuables at home.  The Pre-Admission Testing nurse will instruct you to bring medications if unable to obtain an accurate list in Pre-Admission Testing.          Preventing a Surgical Site Infection:  For 2 to 3 days before surgery, avoid shaving with a razor because the razor can irritate skin and make it easier to develop an infection.    Any areas of open skin can increase the risk of a post-operative wound infection by allowing bacteria to enter and travel throughout the body.  Notify your surgeon if you have any skin wounds / rashes even if it is not near the  expected surgical site.  The area will need assessed to determine if surgery should be delayed until it is healed.  The night prior to surgery shower using a fresh bar of anti-bacterial soap (such as Dial) and clean washcloth.  Sleep in a clean bed with clean clothing.  Do not allow pets to sleep with you.  Shower on the morning of surgery using a fresh bar of anti-bacterial soap (such as Dial) and clean washcloth.  Dry with a clean towel and dress in clean clothing.  Ask your surgeon if you will be receiving antibiotics prior to surgery.  Make sure you, your family, and all healthcare providers clean their hands with soap and water or an alcohol based hand  before caring for you or your wound.    Day of surgery:  Your arrival time is approximately two hours before your scheduled surgery time.  Upon arrival, a Pre-op nurse and Anesthesiologist will review your health history, obtain vital signs, and answer questions you may have.  The only belongings needed at this time will be a list of your home medications and if applicable your C-PAP/BI-PAP machine.  A Pre-op nurse will start an IV and you may receive medication in preparation for surgery, including something to help you relax.     Please be aware that surgery does come with discomfort.  We want to make every effort to control your discomfort so please discuss any uncontrolled symptoms with your nurse.   Your doctor will most likely have prescribed pain medications.      If you are going home after surgery you will receive individualized written care instructions before being discharged.  A responsible adult must drive you to and from the hospital on the day of your surgery and stay with you for 24 hours.  Discharge prescriptions can be filled by the hospital pharmacy during regular pharmacy hours.  If you are having surgery late in the day/evening your prescription may be e-prescribed to your pharmacy.  Please verify your pharmacy hours or chose a 24  hour pharmacy to avoid not having access to your prescription because your pharmacy has closed for the day.    If you are staying overnight following surgery, you will be transported to your hospital room following the recovery period.  [facility] has all private rooms.    If you have any questions please call Pre-Admission Testing at [phone].  Deductibles and co-payments are collected on the day of service. Please be prepared to pay the required co-pay, deductible or deposit on the day of service as defined by your plan.    CHLORHEXIDINE CLOTH INSTRUCTIONS  The morning of surgery follow these instructions using the Chlorhexidine cloths you've been given.  These steps reduce bacteria on the body.  Do not use the cloths near your eyes, ears mouth, genitalia or on open wounds.  Throw the cloths away after use but do not try to flush them down a toilet.      Open and remove one cloth at a time from the package.    Leave the cloth unfolded and begin the bathing.  Massage the skin with the cloths using gentle pressure to remove bacteria.  Do not scrub harshly.   Follow the steps below with one 2% CHG cloth per area (6 total cloths).  One cloth for neck, shoulders and chest.  One cloth for both arms, hands, fingers and underarms (do underarms last).  One cloth for the abdomen followed by groin.  One cloth for right leg and foot including between the toes.  One cloth for left leg and foot including between the toes.  The last cloth is to be used for the back of the neck, back and buttocks.    Allow the CHG to air dry 3 minutes on the skin which will give it time to work and decrease the chance of irritation.  The skin may feel sticky until it is dry.  Do not rinse with water or any other liquid or you will lose the beneficial effects of the CHG.  If mild skin irritation occurs, do rinse the skin to remove the CHG.  Report this to the nurse at time of admission.  Do not apply lotions, creams, ointments,  deodorants or perfumes after using the clothes. Dress in clean clothes before coming to the hospital.     Call your surgeon immediately if you experience any of the following symptoms:  Sore Throat  Shortness of Breath or difficulty breathing  Cough  Chills  Body soreness or muscle pain  Headache  Fever  New loss of taste or smell  Do not arrive for your surgery ill.  Your procedure will need to be rescheduled to another time.  You will need to call your physician before the day of surgery to avoid any unnecessary exposure to hospital staff as well as other patients.

## 2023-05-23 ENCOUNTER — TELEPHONE (OUTPATIENT)
Dept: SURGERY | Facility: CLINIC | Age: 78
End: 2023-05-23
Payer: MEDICARE

## 2023-05-23 NOTE — TELEPHONE ENCOUNTER
Called and notified patient that genetic testing results are negative. Patient stated that Dr. Allen was going to send in a medication for her to take prior to going to the hospital for surgery to calm her down. Advised that I do not see where a prescription has been sent but that I would send Dr. Allen a message.

## 2023-05-24 ENCOUNTER — PATIENT OUTREACH (OUTPATIENT)
Dept: OTHER | Facility: HOSPITAL | Age: 78
End: 2023-05-24
Payer: MEDICARE

## 2023-05-24 NOTE — PROGRESS NOTES
MsRosanna Aleman called with questions and LVM. Called back and we discussed her needs and fears about surgery. She is doing well and has no needs at this time. She was thankful for the call.

## 2023-05-25 ENCOUNTER — ANESTHESIA EVENT (OUTPATIENT)
Dept: PERIOP | Facility: HOSPITAL | Age: 78
End: 2023-05-25
Payer: MEDICARE

## 2023-05-25 ENCOUNTER — APPOINTMENT (OUTPATIENT)
Dept: GENERAL RADIOLOGY | Facility: HOSPITAL | Age: 78
End: 2023-05-25
Payer: MEDICARE

## 2023-05-25 ENCOUNTER — TRANSCRIBE ORDERS (OUTPATIENT)
Dept: LAB | Facility: HOSPITAL | Age: 78
End: 2023-05-25

## 2023-05-25 ENCOUNTER — HOSPITAL ENCOUNTER (OUTPATIENT)
Facility: HOSPITAL | Age: 78
Setting detail: HOSPITAL OUTPATIENT SURGERY
Discharge: HOME | End: 2023-05-25
Attending: STUDENT IN AN ORGANIZED HEALTH CARE EDUCATION/TRAINING PROGRAM | Admitting: STUDENT IN AN ORGANIZED HEALTH CARE EDUCATION/TRAINING PROGRAM
Payer: MEDICARE

## 2023-05-25 ENCOUNTER — HOSPITAL ENCOUNTER (OUTPATIENT)
Dept: NUCLEAR MEDICINE | Facility: HOSPITAL | Age: 78
Discharge: HOME | End: 2023-05-25
Payer: MEDICARE

## 2023-05-25 ENCOUNTER — ANCILLARY PROCEDURE (OUTPATIENT)
Dept: LAB | Facility: HOSPITAL | Age: 78
End: 2023-05-25
Payer: MEDICARE

## 2023-05-25 ENCOUNTER — ANESTHESIA (OUTPATIENT)
Dept: PERIOP | Facility: HOSPITAL | Age: 78
End: 2023-05-25
Payer: MEDICARE

## 2023-05-25 VITALS
SYSTOLIC BLOOD PRESSURE: 137 MMHG | TEMPERATURE: 98 F | OXYGEN SATURATION: 100 % | DIASTOLIC BLOOD PRESSURE: 61 MMHG | HEART RATE: 51 BPM | RESPIRATION RATE: 16 BRPM

## 2023-05-25 DIAGNOSIS — C50.912 INVASIVE DUCTAL CARCINOMA OF LEFT BREAST: ICD-10-CM

## 2023-05-25 DIAGNOSIS — C50.919 MALIGNANT NEOPLASM OF FEMALE BREAST, UNSPECIFIED ESTROGEN RECEPTOR STATUS, UNSPECIFIED LATERALITY, UNSPECIFIED SITE OF BREAST: Primary | ICD-10-CM

## 2023-05-25 DIAGNOSIS — C50.919 MALIGNANT NEOPLASM OF FEMALE BREAST, UNSPECIFIED ESTROGEN RECEPTOR STATUS, UNSPECIFIED LATERALITY, UNSPECIFIED SITE OF BREAST: ICD-10-CM

## 2023-05-25 DIAGNOSIS — C50.912 INVASIVE DUCTAL CARCINOMA OF LEFT BREAST: Primary | ICD-10-CM

## 2023-05-25 PROCEDURE — 88341 IMHCHEM/IMCYTCHM EA ADD ANTB: CPT | Performed by: STUDENT IN AN ORGANIZED HEALTH CARE EDUCATION/TRAINING PROGRAM

## 2023-05-25 PROCEDURE — 25010000002 ONDANSETRON PER 1 MG: Performed by: NURSE ANESTHETIST, CERTIFIED REGISTERED

## 2023-05-25 PROCEDURE — 25010000002 CEFAZOLIN IN DEXTROSE 2-4 GM/100ML-% SOLUTION: Performed by: STUDENT IN AN ORGANIZED HEALTH CARE EDUCATION/TRAINING PROGRAM

## 2023-05-25 PROCEDURE — 19301 PARTIAL MASTECTOMY: CPT | Performed by: STUDENT IN AN ORGANIZED HEALTH CARE EDUCATION/TRAINING PROGRAM

## 2023-05-25 PROCEDURE — 25010000002 FENTANYL CITRATE (PF) 100 MCG/2ML SOLUTION: Performed by: NURSE ANESTHETIST, CERTIFIED REGISTERED

## 2023-05-25 PROCEDURE — A9520 TC99 TILMANOCEPT DIAG 0.5MCI: HCPCS | Performed by: STUDENT IN AN ORGANIZED HEALTH CARE EDUCATION/TRAINING PROGRAM

## 2023-05-25 PROCEDURE — 38900 IO MAP OF SENT LYMPH NODE: CPT | Performed by: STUDENT IN AN ORGANIZED HEALTH CARE EDUCATION/TRAINING PROGRAM

## 2023-05-25 PROCEDURE — 38525 BIOPSY/REMOVAL LYMPH NODES: CPT | Performed by: STUDENT IN AN ORGANIZED HEALTH CARE EDUCATION/TRAINING PROGRAM

## 2023-05-25 PROCEDURE — 25010000002 METHYLENE BLUE 1 % SOLUTION 10 ML VIAL: Performed by: STUDENT IN AN ORGANIZED HEALTH CARE EDUCATION/TRAINING PROGRAM

## 2023-05-25 PROCEDURE — 88307 TISSUE EXAM BY PATHOLOGIST: CPT | Performed by: STUDENT IN AN ORGANIZED HEALTH CARE EDUCATION/TRAINING PROGRAM

## 2023-05-25 PROCEDURE — 38792 RA TRACER ID OF SENTINL NODE: CPT

## 2023-05-25 PROCEDURE — 76098 X-RAY EXAM SURGICAL SPECIMEN: CPT

## 2023-05-25 PROCEDURE — 88360 TUMOR IMMUNOHISTOCHEM/MANUAL: CPT | Performed by: STUDENT IN AN ORGANIZED HEALTH CARE EDUCATION/TRAINING PROGRAM

## 2023-05-25 PROCEDURE — 0 TECHETIUM TC99M TILMANOCEPT: Performed by: STUDENT IN AN ORGANIZED HEALTH CARE EDUCATION/TRAINING PROGRAM

## 2023-05-25 PROCEDURE — 88342 IMHCHEM/IMCYTCHM 1ST ANTB: CPT | Performed by: STUDENT IN AN ORGANIZED HEALTH CARE EDUCATION/TRAINING PROGRAM

## 2023-05-25 PROCEDURE — 25010000002 DEXAMETHASONE SODIUM PHOSPHATE 20 MG/5ML SOLUTION: Performed by: NURSE ANESTHETIST, CERTIFIED REGISTERED

## 2023-05-25 PROCEDURE — 19301 PARTIAL MASTECTOMY: CPT | Performed by: SPECIALIST/TECHNOLOGIST, OTHER

## 2023-05-25 PROCEDURE — 25010000002 PROPOFOL 10 MG/ML EMULSION: Performed by: NURSE ANESTHETIST, CERTIFIED REGISTERED

## 2023-05-25 DEVICE — LIGACLIP MCA MULTIPLE CLIP APPLIERS, 20 MEDIUM CLIPS
Type: IMPLANTABLE DEVICE | Site: BREAST | Status: FUNCTIONAL
Brand: LIGACLIP

## 2023-05-25 RX ORDER — FENTANYL CITRATE 50 UG/ML
INJECTION, SOLUTION INTRAMUSCULAR; INTRAVENOUS AS NEEDED
Status: DISCONTINUED | OUTPATIENT
Start: 2023-05-25 | End: 2023-05-25 | Stop reason: SURG

## 2023-05-25 RX ORDER — HYDROMORPHONE HYDROCHLORIDE 1 MG/ML
0.25 INJECTION, SOLUTION INTRAMUSCULAR; INTRAVENOUS; SUBCUTANEOUS
Status: DISCONTINUED | OUTPATIENT
Start: 2023-05-25 | End: 2023-05-25 | Stop reason: HOSPADM

## 2023-05-25 RX ORDER — CEFAZOLIN SODIUM 2 G/100ML
2 INJECTION, SOLUTION INTRAVENOUS ONCE
Status: COMPLETED | OUTPATIENT
Start: 2023-05-25 | End: 2023-05-25

## 2023-05-25 RX ORDER — LIDOCAINE AND PRILOCAINE 25; 25 MG/G; MG/G
1 CREAM TOPICAL ONCE
Status: COMPLETED | OUTPATIENT
Start: 2023-05-25 | End: 2023-05-25

## 2023-05-25 RX ORDER — FLUMAZENIL 0.1 MG/ML
0.2 INJECTION INTRAVENOUS AS NEEDED
Status: DISCONTINUED | OUTPATIENT
Start: 2023-05-25 | End: 2023-05-25 | Stop reason: HOSPADM

## 2023-05-25 RX ORDER — NALOXONE HCL 0.4 MG/ML
0.2 VIAL (ML) INJECTION AS NEEDED
Status: DISCONTINUED | OUTPATIENT
Start: 2023-05-25 | End: 2023-05-25 | Stop reason: HOSPADM

## 2023-05-25 RX ORDER — PROMETHAZINE HYDROCHLORIDE 25 MG/1
25 TABLET ORAL ONCE AS NEEDED
Status: DISCONTINUED | OUTPATIENT
Start: 2023-05-25 | End: 2023-05-25 | Stop reason: HOSPADM

## 2023-05-25 RX ORDER — HYDRALAZINE HYDROCHLORIDE 20 MG/ML
5 INJECTION INTRAMUSCULAR; INTRAVENOUS
Status: DISCONTINUED | OUTPATIENT
Start: 2023-05-25 | End: 2023-05-25 | Stop reason: HOSPADM

## 2023-05-25 RX ORDER — HYDROCODONE BITARTRATE AND ACETAMINOPHEN 5; 325 MG/1; MG/1
1 TABLET ORAL ONCE AS NEEDED
Status: DISCONTINUED | OUTPATIENT
Start: 2023-05-25 | End: 2023-05-25 | Stop reason: HOSPADM

## 2023-05-25 RX ORDER — DIPHENHYDRAMINE HYDROCHLORIDE 50 MG/ML
12.5 INJECTION INTRAMUSCULAR; INTRAVENOUS
Status: DISCONTINUED | OUTPATIENT
Start: 2023-05-25 | End: 2023-05-25 | Stop reason: HOSPADM

## 2023-05-25 RX ORDER — LABETALOL HYDROCHLORIDE 5 MG/ML
5 INJECTION, SOLUTION INTRAVENOUS
Status: DISCONTINUED | OUTPATIENT
Start: 2023-05-25 | End: 2023-05-25 | Stop reason: HOSPADM

## 2023-05-25 RX ORDER — BUPIVACAINE HYDROCHLORIDE 2.5 MG/ML
INJECTION, SOLUTION INFILTRATION; PERINEURAL AS NEEDED
Status: DISCONTINUED | OUTPATIENT
Start: 2023-05-25 | End: 2023-05-25 | Stop reason: HOSPADM

## 2023-05-25 RX ORDER — IPRATROPIUM BROMIDE AND ALBUTEROL SULFATE 2.5; .5 MG/3ML; MG/3ML
3 SOLUTION RESPIRATORY (INHALATION) ONCE AS NEEDED
Status: DISCONTINUED | OUTPATIENT
Start: 2023-05-25 | End: 2023-05-25 | Stop reason: HOSPADM

## 2023-05-25 RX ORDER — PROPOFOL 10 MG/ML
VIAL (ML) INTRAVENOUS AS NEEDED
Status: DISCONTINUED | OUTPATIENT
Start: 2023-05-25 | End: 2023-05-25 | Stop reason: SURG

## 2023-05-25 RX ORDER — HYDROCODONE BITARTRATE AND ACETAMINOPHEN 7.5; 325 MG/1; MG/1
1 TABLET ORAL EVERY 4 HOURS PRN
Status: DISCONTINUED | OUTPATIENT
Start: 2023-05-25 | End: 2023-05-25 | Stop reason: HOSPADM

## 2023-05-25 RX ORDER — SODIUM CHLORIDE 0.9 % (FLUSH) 0.9 %
3-10 SYRINGE (ML) INJECTION AS NEEDED
Status: DISCONTINUED | OUTPATIENT
Start: 2023-05-25 | End: 2023-05-25 | Stop reason: HOSPADM

## 2023-05-25 RX ORDER — FENTANYL CITRATE 50 UG/ML
25 INJECTION, SOLUTION INTRAMUSCULAR; INTRAVENOUS
Status: DISCONTINUED | OUTPATIENT
Start: 2023-05-25 | End: 2023-05-25 | Stop reason: HOSPADM

## 2023-05-25 RX ORDER — SODIUM CHLORIDE, SODIUM LACTATE, POTASSIUM CHLORIDE, CALCIUM CHLORIDE 600; 310; 30; 20 MG/100ML; MG/100ML; MG/100ML; MG/100ML
9 INJECTION, SOLUTION INTRAVENOUS CONTINUOUS
Status: DISCONTINUED | OUTPATIENT
Start: 2023-05-25 | End: 2023-05-25 | Stop reason: HOSPADM

## 2023-05-25 RX ORDER — SODIUM CHLORIDE 0.9 % (FLUSH) 0.9 %
3 SYRINGE (ML) INJECTION EVERY 12 HOURS SCHEDULED
Status: DISCONTINUED | OUTPATIENT
Start: 2023-05-25 | End: 2023-05-25 | Stop reason: HOSPADM

## 2023-05-25 RX ORDER — DROPERIDOL 2.5 MG/ML
0.62 INJECTION, SOLUTION INTRAMUSCULAR; INTRAVENOUS
Status: DISCONTINUED | OUTPATIENT
Start: 2023-05-25 | End: 2023-05-25 | Stop reason: HOSPADM

## 2023-05-25 RX ORDER — ONDANSETRON 2 MG/ML
4 INJECTION INTRAMUSCULAR; INTRAVENOUS ONCE AS NEEDED
Status: DISCONTINUED | OUTPATIENT
Start: 2023-05-25 | End: 2023-05-25 | Stop reason: HOSPADM

## 2023-05-25 RX ORDER — TRAMADOL HYDROCHLORIDE 50 MG/1
50 TABLET ORAL EVERY 6 HOURS PRN
Qty: 8 TABLET | Refills: 0 | Status: SHIPPED | OUTPATIENT
Start: 2023-05-25 | End: 2023-05-31

## 2023-05-25 RX ORDER — LIDOCAINE HYDROCHLORIDE 10 MG/ML
0.5 INJECTION, SOLUTION EPIDURAL; INFILTRATION; INTRACAUDAL; PERINEURAL ONCE AS NEEDED
Status: DISCONTINUED | OUTPATIENT
Start: 2023-05-25 | End: 2023-05-25 | Stop reason: HOSPADM

## 2023-05-25 RX ORDER — MIDAZOLAM HYDROCHLORIDE 1 MG/ML
0.5 INJECTION INTRAMUSCULAR; INTRAVENOUS
Status: DISCONTINUED | OUTPATIENT
Start: 2023-05-25 | End: 2023-05-25 | Stop reason: HOSPADM

## 2023-05-25 RX ORDER — ONDANSETRON 2 MG/ML
INJECTION INTRAMUSCULAR; INTRAVENOUS AS NEEDED
Status: DISCONTINUED | OUTPATIENT
Start: 2023-05-25 | End: 2023-05-25 | Stop reason: SURG

## 2023-05-25 RX ORDER — DIAZEPAM 5 MG/1
5 TABLET ORAL ONCE
Status: COMPLETED | OUTPATIENT
Start: 2023-05-25 | End: 2023-05-25

## 2023-05-25 RX ORDER — MAGNESIUM HYDROXIDE 1200 MG/15ML
LIQUID ORAL AS NEEDED
Status: DISCONTINUED | OUTPATIENT
Start: 2023-05-25 | End: 2023-05-25 | Stop reason: HOSPADM

## 2023-05-25 RX ORDER — GLYCOPYRROLATE 0.2 MG/ML
INJECTION INTRAMUSCULAR; INTRAVENOUS AS NEEDED
Status: DISCONTINUED | OUTPATIENT
Start: 2023-05-25 | End: 2023-05-25 | Stop reason: SURG

## 2023-05-25 RX ORDER — METHYLENE BLUE 10 MG/ML
INJECTION INTRAVENOUS AS NEEDED
Status: DISCONTINUED | OUTPATIENT
Start: 2023-05-25 | End: 2023-05-25 | Stop reason: HOSPADM

## 2023-05-25 RX ORDER — DEXAMETHASONE SODIUM PHOSPHATE 4 MG/ML
INJECTION, SOLUTION INTRA-ARTICULAR; INTRALESIONAL; INTRAMUSCULAR; INTRAVENOUS; SOFT TISSUE AS NEEDED
Status: DISCONTINUED | OUTPATIENT
Start: 2023-05-25 | End: 2023-05-25 | Stop reason: SURG

## 2023-05-25 RX ORDER — LIDOCAINE HYDROCHLORIDE 20 MG/ML
INJECTION, SOLUTION INTRAVENOUS AS NEEDED
Status: DISCONTINUED | OUTPATIENT
Start: 2023-05-25 | End: 2023-05-25 | Stop reason: SURG

## 2023-05-25 RX ORDER — EPHEDRINE SULFATE 50 MG/ML
5 INJECTION, SOLUTION INTRAVENOUS ONCE AS NEEDED
Status: DISCONTINUED | OUTPATIENT
Start: 2023-05-25 | End: 2023-05-25 | Stop reason: HOSPADM

## 2023-05-25 RX ORDER — PROMETHAZINE HYDROCHLORIDE 25 MG/1
25 SUPPOSITORY RECTAL ONCE AS NEEDED
Status: DISCONTINUED | OUTPATIENT
Start: 2023-05-25 | End: 2023-05-25 | Stop reason: HOSPADM

## 2023-05-25 RX ADMIN — ONDANSETRON 4 MG: 2 INJECTION INTRAMUSCULAR; INTRAVENOUS at 14:30

## 2023-05-25 RX ADMIN — DEXAMETHASONE SODIUM PHOSPHATE 8 MG: 4 INJECTION, SOLUTION INTRAMUSCULAR; INTRAVENOUS at 13:37

## 2023-05-25 RX ADMIN — CEFAZOLIN SODIUM 2 G: 2 INJECTION, SOLUTION INTRAVENOUS at 13:29

## 2023-05-25 RX ADMIN — TILMANOCEPT 1 DOSE: KIT at 12:18

## 2023-05-25 RX ADMIN — PROPOFOL 150 MG: 10 INJECTION, EMULSION INTRAVENOUS at 13:37

## 2023-05-25 RX ADMIN — SODIUM CHLORIDE, POTASSIUM CHLORIDE, SODIUM LACTATE AND CALCIUM CHLORIDE 9 ML/HR: 600; 310; 30; 20 INJECTION, SOLUTION INTRAVENOUS at 13:28

## 2023-05-25 RX ADMIN — FENTANYL CITRATE 25 MCG: 50 INJECTION, SOLUTION INTRAMUSCULAR; INTRAVENOUS at 13:37

## 2023-05-25 RX ADMIN — FENTANYL CITRATE 25 MCG: 50 INJECTION, SOLUTION INTRAMUSCULAR; INTRAVENOUS at 14:09

## 2023-05-25 RX ADMIN — LIDOCAINE AND PRILOCAINE 1 APPLICATION: 25; 25 CREAM TOPICAL at 10:48

## 2023-05-25 RX ADMIN — FENTANYL CITRATE 50 MCG: 50 INJECTION, SOLUTION INTRAMUSCULAR; INTRAVENOUS at 14:11

## 2023-05-25 RX ADMIN — HYDROCODONE BITARTRATE AND ACETAMINOPHEN 1 TABLET: 7.5; 325 TABLET ORAL at 14:57

## 2023-05-25 RX ADMIN — LIDOCAINE HYDROCHLORIDE 60 MG: 20 INJECTION, SOLUTION INTRAVENOUS at 13:37

## 2023-05-25 RX ADMIN — GLYCOPYRROLATE 0.1 MG: 1 INJECTION INTRAMUSCULAR; INTRAVENOUS at 13:37

## 2023-05-25 RX ADMIN — DIAZEPAM 5 MG: 5 TABLET ORAL at 11:00

## 2023-05-25 NOTE — ANESTHESIA PREPROCEDURE EVALUATION
Anesthesia Evaluation     Patient summary reviewed and Nursing notes reviewed   no history of anesthetic complications:  NPO Solid Status: > 6 hours  NPO Liquid Status: > 2 hours           Airway   Mallampati: III  TM distance: >3 FB  Neck ROM: full  Small opening  Dental - normal exam     Pulmonary    (+) a smoker Former, shortness of breath,   (-) asthma, sleep apnea  Cardiovascular   Exercise tolerance: poor (<4 METS)    ECG reviewed    (+) hypertension, valvular problems/murmurs MR, hyperlipidemia,   (-) CAD, dysrhythmias, angina, cardiac stents    ROS comment: Chronic Martínez. Has had LHC (2021) w/ no significant CAD present.     Most recent echo (2022) shows nl LV/RV fxn, mild-mod MR    Neuro/Psych  (+) CVA (Remote hx hemorrhagic stroke. No residual deficits. ),    (-) seizures  GI/Hepatic/Renal/Endo    (+) obesity,     (-) GERD, liver disease, no renal disease, diabetes, no thyroid disorder    Musculoskeletal     Abdominal    Substance History      OB/GYN          Other   arthritis,    history of cancer active                    Anesthesia Plan    ASA 3     general       Anesthetic plan, risks, benefits, and alternatives have been provided, discussed and informed consent has been obtained with: patient.        CODE STATUS:

## 2023-05-25 NOTE — ANESTHESIA POSTPROCEDURE EVALUATION
Patient: Joi Aleman    Procedure Summary     Date: 05/25/23 Room / Location:  [facility] /  [facility]    Anesthesia Start: 1332 Anesthesia Stop: 1450    Procedure: left breast Chelsea guided lumpectomy with sentinel lymph node biopsy (Left: Breast) Diagnosis:       Malignant neoplasm of female breast, unspecified estrogen receptor status, unspecified laterality, unspecified site of breast      (Malignant neoplasm of female breast, unspecified estrogen receptor status, unspecified laterality, unspecified site of breast [C50.919])    Surgeons: Alissa Allen MD Provider: Tripp Garcia MD    Anesthesia Type: general ASA Status: 3          Anesthesia Type: general    Vitals  Vitals Value Taken Time   /79 05/25/23 1516   Temp 36.7 °C (98 °F) 05/25/23 1520   Pulse 55 05/25/23 1530   Resp 16 05/25/23 1520   SpO2 97 % 05/25/23 1530   Vitals shown include unvalidated device data.        Post Anesthesia Care and Evaluation    Patient location during evaluation: bedside  Patient participation: complete - patient participated  Level of consciousness: awake and alert  Pain management: adequate    Airway patency: patent  Anesthetic complications: No anesthetic complications  PONV Status: controlled  Cardiovascular status: acceptable and hemodynamically stable  Respiratory status: acceptable, spontaneous ventilation and nonlabored ventilation  Hydration status: acceptable    Comments: /61 (BP Location: Right arm, Patient Position: Lying)   Pulse 51   Temp 36.7 °C (98 °F)   Resp 16   SpO2 95%

## 2023-05-25 NOTE — ANESTHESIA PROCEDURE NOTES
Airway  Urgency: elective    Date/Time: 5/25/2023 1:38 PM  Airway not difficult    General Information and Staff    Patient location during procedure: OR  CRNA/CAA: Theodora Figueroa CRNA    Indications and Patient Condition  Indications for airway management: airway protection    Preoxygenated: yes  Mask difficulty assessment: 0 - not attempted    Final Airway Details  Final airway type: supraglottic airway      Successful airway: unique  Size 4     Number of attempts at approach: 1  Assessment: lips, teeth, and gum same as pre-op

## 2023-05-26 ENCOUNTER — TELEPHONE (OUTPATIENT)
Dept: SURGERY | Facility: CLINIC | Age: 78
End: 2023-05-26
Payer: MEDICARE

## 2023-05-26 NOTE — TELEPHONE ENCOUNTER
Returned call to patient.  Confirmed with patient she is experiencing severe headache, blurry vision, and sore throat.  Advised patient her sore throat is due to the tube put down her throat during surgery and should feel better in a few days.  Patient also reports dizziness and dry mouth.  Patient denies any difficulty walking, talking, or grasping with her hands.  She also denies fever, nausea, and vomiting.  Advised patient message would be routed to on call doctor.

## 2023-05-26 NOTE — TELEPHONE ENCOUNTER
Left voice mail, okay per verbal release, informed patient of message and recommendations from Dr. Duarte.  Asked patient to call back with any questions.

## 2023-05-29 NOTE — OP NOTE
OPERATIVE REPORT     DATE:  5/25/2023     SURGEON: Alissa Allen MD      ASSISTANT:  Laura Fernandez, who was present for necessary suctioning, retracting, suturing throughout the procedure      OPERATION PERFORMED:  Left breast Jenny guided lumpectomy with sentinel lymph node biopsy     PREOPERATIVE DIAGNOSIS:  Invasive ductal carcinoma of the left breast      POSTOPERATIVE DIAGNOSIS: Same     ANESTHESIA: General      SPECIMEN:   Left breast lumpectomy (short stitch superior, long lateral, double anterior)  Additional anterior margin (stitch marks true margin)  Additional posterior margin (stitch marks true margin)  Additional medial margin (stitch marks true margin)  Additional lateral margin (stitch marks true margin)  Additional superior margin (stitch marks true margin)  Additional inferior margin (stitch marks true margin)    Left axillary sentinel lymph node #1   Left axillary sentinel lymph node #2    DRAINS: None     BLOOD LOSS: Minimal     INDICATION FOR OPERATION:Joi Aleman is a 77 y.o. lady who was recently diagnosed with left breast invasive ductal carcinoma.  She ultimately elected to proceed with left breast JENNY guided lumpectomy with sentinel lymph node biopsy. All risks (including bleeding, infection, damage to surrounding structures, need for further surgery, pathologic upgrade), benefits and alternatives were explained to the patient who agreed and wished to proceed. Informed consent was signed.      OPERATIVE COURSE: The patient was taken to the operating room, transferred onto the operating room table, and underwent anesthesia without incident. 5 cc of blue dye was injected into the dermal layer of the left nipple areolar complex. The patient was prepped and draped in sterile fashion. A time out was performed and preoperative antibiotics were given.  The Jenny probe was used to identify the strongest signal.  An incision was marked.  Half percent Marcaine with epinephrine was injected into the  skin and subcutaneous tissues.  A curvilinear incision was made along the breast.  Bovie electrocautery was used to create a flap approximately 1 cm in thickness.  The tissue was transected circumferentially along the strongest Chelsea signal.  The tissue was amputated at its base.  It was marked as listed above.  Specimen radiograph confirmed clip, Chelsea, and abnormal breast tissue.  It was sent for fresh permanent specimen. Additional margins were taken along all borders. These were done with Allis clamps and approximately 1 cm in thickness.  They were marked as listed above.  The area was irrigated and appeared hemostatic.  There were no signs of bleeding.      We then performed a sentinel lymph node biopsy. A curvilinear incision was made just inferior to the hairbearing area of the left axilla. Bovie electrocautery was used to dissect down through the skin and subcutaneous tissues and through the clavipectoral fascia into the axilla.  2 sentinel lymph nodes were identified. These were removed with Bovie electrocautery and clips across all major lymphovascular structures. Once this was done, there were no hot, blue, or palpable lymph nodes remaining. The area was irrigated and appeared hemostatic.  These were sent for permanent specimen.    The rest of the local anesthetic was administered.  The incisions were closed with interrupted 3-0 Vicryl sutures and a running 4-0 Monocryl suture.  Skin glue was placed over the incision.  The patient tolerated the procedure well. All needle and lap counts were correct at the end of the case. The patient was then awoken from anesthesia and taken to recovery for further monitoring.     Pelzer Node Biopsy for Breast Cancer - Left  Operation performed with curative intent. Yes   Tracer(s) used to identify sentinel nodes in the upfront surgery (non-neoadjuvant) setting (select all that apply). Dye and Radioactive tracer   Tracer(s) used to identify sentinel nodes in the  neoadjuvant setting (select all that apply). N/A   All nodes (colored or non-colored) present at the end of a dye-filled lymphatic channel were removed. Yes   All significantly radioactive nodes were removed. Yes   All palpably suspicious nodes were removed. Yes   Biopsy-proven positive nodes marked with clips prior to chemotherapy were identified and removed. N/A        Alissa Allen MD   General and Endoscopic Surgery  [facility]     [address], [address]  [place], [place], [address]  P: [phone]  F: [phone]

## 2023-05-30 ENCOUNTER — PATIENT MESSAGE (OUTPATIENT)
Dept: CARDIOLOGY | Facility: CLINIC | Age: 78
End: 2023-05-30

## 2023-05-30 ENCOUNTER — TELEPHONE (OUTPATIENT)
Dept: SURGERY | Facility: CLINIC | Age: 78
End: 2023-05-30

## 2023-05-30 NOTE — PROGRESS NOTES
RM:________     PCP: Yaneth Kelly MD    : 1945  AGE: 77 y.o.  EST PATIENT   REASON FOR VISIT/  CC:    BP Readings from Last 3 Encounters:   23 137/61   23 130/61   23 103/59        WT: ____________ BP: __________L __________R HR______    CHEST PAIN: _____________    SOA: _____________PALPS: _______________     LIGHTHEADED: ___________FATIGUE: ________________ EDEMA __________    ALLERGIES:Patient has no known allergies. SMOKING HISTORY:  Social History     Tobacco Use   • Smoking status: Former     Packs/day: 1.50     Years: 15.00     Pack years: 22.50     Types: Cigarettes     Start date: 1964     Quit date: 1977     Years since quittin.3   • Smokeless tobacco: Never   Vaping Use   • Vaping Use: Never used   Substance Use Topics   • Alcohol use: Yes     Comment: Once a month   • Drug use: Never     CAFFEINE USE_________________  ALCOHOL ______________________    Below is the patient's most recent value for Albumin, ALT, AST, BUN, Calcium, Chloride, Cholesterol, CO2, Creatinine, GFR, Glucose, HDL, Hematocrit, Hemoglobin, Hemoglobin A1C, LDL, Magnesium, Phosphorus, Platelets, Potassium, PSA, Sodium, Triglycerides, TSH and WBC.   Lab Results   Component Value Date    ALBUMIN 4.3 2023    ALT 34 (H) 2023    AST 26 2023    BUN 16 2023    CALCIUM 9.9 2023    CL 99 2023    CO2 31.2 (H) 2023    CREATININE 1.12 (H) 2023    GLU 96 2018    HDL 79 01/10/2023    HCT 36.3 2023    HGB 12.1 2023    HGBA1C 5.5 2023     (H) 01/10/2023    MG 2.0 2020     2023    K 3.6 2023     2023    TRIG 103 01/10/2023    TSH 1.420 01/10/2023    WBC 5.86 2023          NEW DIAGNOSIS/ SURGERY/ HOSP OR ED VISITS: ______________________    __________________________________________________________________      RECENT LABS OR DIAGNOSTIC TESTING:   _____________________________    __________________________________________________________________      ASSESSMENT/ PLAN: _______________________________________________    __________________________________________________________________

## 2023-05-30 NOTE — TELEPHONE ENCOUNTER
From: Joi Aleman  To: Thomas Estrada  Sent: 5/30/2023 11:31 AM EDT  Subject: My Aug 3 appt    My heart rate keeps jumping up to 180 + or -. My oxygen level is normally around 80 unless I keep taking deep breaths. I feel I need to come in sooner than Aug 3 please.   Thank you   Joi Aleman   1945

## 2023-05-30 NOTE — TELEPHONE ENCOUNTER
Patient called inquiring if pain in her arm is normal s/p left breast Chelsea guided lumpectomy with SLN biopsy on 5/23/23 by Dr. Allen. States she was considering driving, but did not feel safe due to the amount of pain and discomfort she is having when raising up her left arm. Advised this is very common after breast surgery. Recommended to continue wearing her bra for compression and to avoid driving if she does not feel comfortable. Patient verbalized understanding and is aware to call if she has any swelling or worsening pain.

## 2023-05-30 NOTE — TELEPHONE ENCOUNTER
Called to discuss with pt and verify medications.  She is post op from breast surgery, but reports that her issues were ongoing for the past 6 months or more.  Pt states that she gets very weak when her hr and bp are elevated.  She really could not give me any elevated bp but that her bp and hr come down after taking her Amlodpine 10 mg in the morning.  She states that they stopped her Metoprolol in the hospital due to low bp.   Pt also still c/o edema in the lower extremities.  Pt is not in any distress at this time.  She denies any cp, but does have some SOA when she gets ups to become active and goes away at rest.      Pt has an appt on 08/03.  Please advise.

## 2023-05-31 ENCOUNTER — HOSPITAL ENCOUNTER (OUTPATIENT)
Dept: CARDIOLOGY | Facility: HOSPITAL | Age: 78
Discharge: HOME | End: 2023-05-31
Admitting: INTERNAL MEDICINE

## 2023-05-31 ENCOUNTER — OFFICE VISIT (OUTPATIENT)
Dept: CARDIOLOGY | Facility: CLINIC | Age: 78
End: 2023-05-31

## 2023-05-31 VITALS
BODY MASS INDEX: 30.84 KG/M2 | HEIGHT: 62 IN | SYSTOLIC BLOOD PRESSURE: 104 MMHG | OXYGEN SATURATION: 98 % | DIASTOLIC BLOOD PRESSURE: 60 MMHG | WEIGHT: 167.6 LBS | HEART RATE: 65 BPM

## 2023-05-31 DIAGNOSIS — R06.09 DYSPNEA ON EXERTION: ICD-10-CM

## 2023-05-31 DIAGNOSIS — R53.83 FATIGUE, UNSPECIFIED TYPE: ICD-10-CM

## 2023-05-31 DIAGNOSIS — Q24.5 CORONARY SINUS ABNORMALITY: ICD-10-CM

## 2023-05-31 DIAGNOSIS — R09.89 LABILE BLOOD PRESSURE: ICD-10-CM

## 2023-05-31 DIAGNOSIS — Q24.5 MYOCARDIAL BRIDGE: ICD-10-CM

## 2023-05-31 DIAGNOSIS — M79.89 LEG SWELLING: ICD-10-CM

## 2023-05-31 DIAGNOSIS — R09.02 HYPOXIA: ICD-10-CM

## 2023-05-31 DIAGNOSIS — I10 ESSENTIAL HYPERTENSION: ICD-10-CM

## 2023-05-31 DIAGNOSIS — R42 LIGHTHEADEDNESS: Primary | ICD-10-CM

## 2023-05-31 DIAGNOSIS — R42 LIGHTHEADEDNESS: ICD-10-CM

## 2023-05-31 LAB
ALBUMIN SERPL-MCNC: 4.1 G/DL (ref 3.5–5.2)
ALBUMIN/GLOB SERPL: 1.8 G/DL
ALP SERPL-CCNC: 86 U/L (ref 39–117)
ALT SERPL W P-5'-P-CCNC: 30 U/L (ref 1–33)
ANION GAP SERPL CALCULATED.3IONS-SCNC: 11 MMOL/L (ref 5–15)
AST SERPL-CCNC: 39 U/L (ref 1–32)
BASOPHILS # BLD AUTO: 0.05 10*3/MM3 (ref 0–0.2)
BASOPHILS NFR BLD AUTO: 0.6 % (ref 0–1.5)
BILIRUB SERPL-MCNC: 0.5 MG/DL (ref 0–1.2)
BUN SERPL-MCNC: 19 MG/DL (ref 8–23)
BUN/CREAT SERPL: 21.6 (ref 7–25)
CALCIUM SPEC-SCNC: 9.3 MG/DL (ref 8.6–10.5)
CHLORIDE SERPL-SCNC: 98 MMOL/L (ref 98–107)
CO2 SERPL-SCNC: 26 MMOL/L (ref 22–29)
CREAT SERPL-MCNC: 0.88 MG/DL (ref 0.57–1)
DEPRECATED RDW RBC AUTO: 43.1 FL (ref 37–54)
EGFRCR SERPLBLD CKD-EPI 2021: 68.6 ML/MIN/1.73
EOSINOPHIL # BLD AUTO: 0.16 10*3/MM3 (ref 0–0.4)
EOSINOPHIL NFR BLD AUTO: 1.9 % (ref 0.3–6.2)
ERYTHROCYTE [DISTWIDTH] IN BLOOD BY AUTOMATED COUNT: 13.2 % (ref 12.3–15.4)
GLOBULIN UR ELPH-MCNC: 2.3 GM/DL
GLUCOSE SERPL-MCNC: 92 MG/DL (ref 65–99)
HCT VFR BLD AUTO: 39.3 % (ref 34–46.6)
HGB BLD-MCNC: 12.9 G/DL (ref 12–15.9)
IMM GRANULOCYTES # BLD AUTO: 0.05 10*3/MM3 (ref 0–0.05)
IMM GRANULOCYTES NFR BLD AUTO: 0.6 % (ref 0–0.5)
LAB AP CASE REPORT: NORMAL
LAB AP CLINICAL INFORMATION: NORMAL
LAB AP DIAGNOSIS COMMENT: NORMAL
LAB AP SPECIAL STAINS: NORMAL
LAB AP SYNOPTIC CHECKLIST: NORMAL
LYMPHOCYTES # BLD AUTO: 1.87 10*3/MM3 (ref 0.7–3.1)
LYMPHOCYTES NFR BLD AUTO: 22.2 % (ref 19.6–45.3)
MCH RBC QN AUTO: 29.6 PG (ref 26.6–33)
MCHC RBC AUTO-ENTMCNC: 32.8 G/DL (ref 31.5–35.7)
MCV RBC AUTO: 90.1 FL (ref 79–97)
MONOCYTES # BLD AUTO: 0.87 10*3/MM3 (ref 0.1–0.9)
MONOCYTES NFR BLD AUTO: 10.3 % (ref 5–12)
NEUTROPHILS NFR BLD AUTO: 5.41 10*3/MM3 (ref 1.7–7)
NEUTROPHILS NFR BLD AUTO: 64.4 % (ref 42.7–76)
NRBC BLD AUTO-RTO: 0 /100 WBC (ref 0–0.2)
NT-PROBNP SERPL-MCNC: 342 PG/ML (ref 0–1800)
PATH REPORT.FINAL DX SPEC: NORMAL
PATH REPORT.GROSS SPEC: NORMAL
PLATELET # BLD AUTO: 268 10*3/MM3 (ref 140–450)
PMV BLD AUTO: 8.8 FL (ref 6–12)
POTASSIUM SERPL-SCNC: 3.8 MMOL/L (ref 3.5–5.2)
PROT SERPL-MCNC: 6.4 G/DL (ref 6–8.5)
RBC # BLD AUTO: 4.36 10*6/MM3 (ref 3.77–5.28)
SODIUM SERPL-SCNC: 135 MMOL/L (ref 136–145)
TSH SERPL DL<=0.05 MIU/L-ACNC: 0.5 UIU/ML (ref 0.27–4.2)
WBC NRBC COR # BLD: 8.41 10*3/MM3 (ref 3.4–10.8)

## 2023-05-31 PROCEDURE — 1160F RVW MEDS BY RX/DR IN RCRD: CPT | Performed by: INTERNAL MEDICINE

## 2023-05-31 PROCEDURE — 99214 OFFICE O/P EST MOD 30 MIN: CPT | Performed by: INTERNAL MEDICINE

## 2023-05-31 PROCEDURE — 84443 ASSAY THYROID STIM HORMONE: CPT | Performed by: INTERNAL MEDICINE

## 2023-05-31 PROCEDURE — 83880 ASSAY OF NATRIURETIC PEPTIDE: CPT | Performed by: INTERNAL MEDICINE

## 2023-05-31 PROCEDURE — 93000 ELECTROCARDIOGRAM COMPLETE: CPT | Performed by: INTERNAL MEDICINE

## 2023-05-31 PROCEDURE — 1159F MED LIST DOCD IN RCRD: CPT | Performed by: INTERNAL MEDICINE

## 2023-05-31 PROCEDURE — 3074F SYST BP LT 130 MM HG: CPT | Performed by: INTERNAL MEDICINE

## 2023-05-31 PROCEDURE — 36415 COLL VENOUS BLD VENIPUNCTURE: CPT

## 2023-05-31 PROCEDURE — 3078F DIAST BP <80 MM HG: CPT | Performed by: INTERNAL MEDICINE

## 2023-05-31 PROCEDURE — 85025 COMPLETE CBC W/AUTO DIFF WBC: CPT | Performed by: INTERNAL MEDICINE

## 2023-05-31 PROCEDURE — 80053 COMPREHEN METABOLIC PANEL: CPT | Performed by: INTERNAL MEDICINE

## 2023-05-31 NOTE — PROGRESS NOTES
Date of Office Visit: 23  Encounter Provider: Thomas Estrada MD  Place of Service: [facility] CARDIOLOGY  Patient Name: Joi Aleman  :1945    Chief Complaint   Patient presents with   • Edema   :     HPI:     Ms. Aleman is 77 y.o. and presents today for a sick visit. I have reviewed prior notes and there are no changes except for any new updates described below. I have also reviewed any information entered into the medical record by the patient or by ancillary staff.     She has a history of hypertension.  She has a history of a heart murmur and had an essentially normal echo at [place] in 2016. She had a normal treadmill stress test in .  She had a normal perfusion stress at [place] in .      She presented in May 2020 with 4-5 years of progressive generalized severe fatigue, generalized weakness, exertional dyspnea, and occasional atypical chest pain.  Her EKG was normal except for sinus bradycardia.  I noted a systolic murmur.  I checked an echo; it revealed normal LV systolic function, normal diastolic function for age, and possible MVP/MR.  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  It reported moderate TR with moderate PHTN. However, when I sent her for a right heart cath, all of her pressures were normal.  There was no step up, and no evidence of constriction.  Her coronaries were essentially normal (there were some luminal irregularities, and very mild LAD bridging). I sent her for a cardiac CT.  No congenital abnormalities were seen (specifically, the pulmonary veins were normal, and no ASD was noted).  She did have mild RA/RV dilation, but normal function.  Her Agatston score was 29.  Her lungs looked normal.  Because of the mild LAD bridging, she was started on metoprolol tartrate.    Last week, she underwent left-sided lumpectomy and sentinel node  biopsy for a new diagnosis of ER/ND positive HER2/emely negative breast cancer.  She meets with the oncologist next week.  Since then she reports absolutely profound fatigue.  She reports extreme activity intolerance, even with walking around her house.  If she stands on her feet for a while she reports lightheadedness.  She has a blood pressure cuff at home that says that her blood pressure has been in the 180s systolic.  She has a pulse oximeter that says that her oxygen saturation has been 80%.  She called yesterday and we asked her to come in today.  She does not have chest pain and she says she has not passed out.  She has not taken metoprolol in over a week.    Past Medical History:   Diagnosis Date   • Agatston CAC score, <100     2020: 29   • Anxiety and depression    • Arthritis    • Brain aneurysm     FOLLOWED AND THEN RELEASED HER YEARS AGO-NO ISSUES CURRENLTY   • Breast cancer in female     LEFT   • Frequent urination    • Hyperlipidemia    • Hypertension    • Insomnia    • Knee pain, bilateral     AT TIMES   • Memory change     SINCE STROKE. UNABLE TO REMEBER LIST OF INSTUCTIONS   • Myocardial bridge     very mild, mid-LAD   • Osteoarthritis    • SOB (shortness of breath) on exertion    • Stroke Years ago    About 8 to 10 years ago       Past Surgical History:   Procedure Laterality Date   • BLEPHAROPLASTY     • BREAST BIOPSY Left 04/24/2023   • BREAST LUMPECTOMY WITH SENTINEL NODE BIOPSY Left 5/25/2023    Procedure: left breast [place] guided lumpectomy with sentinel lymph node biopsy;  Surgeon: Alissa Allen MD;  [place] [facility] [place] [facility] [place]  Service: General;  Laterality: Left;   • CARDIAC CATHETERIZATION N/A 05/12/2020    Procedure: Coronary angiography;  Surgeon: Víctor Hernandez MD;  Location: [facility] CATH INVASIVE LOCATION;  Service: Cardiovascular;  Laterality: N/A;   • CARDIAC CATHETERIZATION N/A 05/12/2020    Procedure: Left heart cath;  Surgeon: Víctor Hernandez MD;  Location: [facility]  INVASIVE LOCATION;  Service: Cardiovascular;  Laterality: N/A;   • CARDIAC CATHETERIZATION N/A 2020    Procedure: Left ventriculography;  Surgeon: Víctor Hernandez MD;  Location:  [facility] CATH INVASIVE LOCATION;  Service: Cardiovascular;  Laterality: N/A;   • CARDIAC CATHETERIZATION N/A 2020    Procedure: Right heart cath with shunt run;  Surgeon: Víctor Hernandez MD;  Location:  [facility] CATH INVASIVE LOCATION;  Service: Cardiovascular;  Laterality: N/A;   • CARPAL TUNNEL RELEASE Right    • COLON SURGERY      COLON RESECTION FOR ENDOMETROSIS   • COLONOSCOPY      normal less than 10 years ago   • EYE SURGERY      Cataracts   • HYSTERECTOMY     • OOPHORECTOMY     • TOE SURGERY Right     right big toe replecemnet 12 years ago   • TONSILLECTOMY     • TOTAL KNEE ARTHROPLASTY Right 2021    Procedure: TOTAL KNEE ARTHROPLASTY, with left knee steroid injection;  Surgeon: Shamar Boone MD;  Location: [facility] MAIN OR;  Service: Orthopedics;  Laterality: Right;       Social History     Socioeconomic History   • Marital status: Single   • Number of children: 2   Tobacco Use   • Smoking status: Former     Packs/day: 1.50     Years: 15.00     Pack years: 22.50     Types: Cigarettes     Start date: 1964     Quit date: 1977     Years since quittin.4   • Smokeless tobacco: Never   Vaping Use   • Vaping Use: Never used   Substance and Sexual Activity   • Alcohol use: Yes     Comment: Once a month   • Drug use: Never   • Sexual activity: Not Currently     Partners: Male     Birth control/protection: None     Family History   Problem Relation Age of Onset   • Breast cancer Mother 76   • Arthritis Mother    • Hypertension Mother    • Heart disease Father         And his father   • Heart attack Father    • Hyperlipidemia Father    • No Known Problems Sister    • Heart disease Sister    • No Known Problems Brother    • Prostate cancer Maternal Grandfather    • Heart disease Paternal Grandfather    • Heart  attack Paternal Grandfather    • Malig Hyperthermia Neg Hx      Review of Systems   Constitutional: Positive for malaise/fatigue.   HENT: Negative for ear pain and sore throat.    Cardiovascular: Positive for dyspnea on exertion and leg swelling. Negative for paroxysmal nocturnal dyspnea and syncope.   Respiratory: Negative for hemoptysis and sputum production.    Skin: Negative for rash.   Musculoskeletal: Negative for neck pain.   Gastrointestinal: Negative for abdominal pain and vomiting.   Neurological: Positive for excessive daytime sleepiness, light-headedness and weakness. Negative for headaches.   All other systems reviewed and are negative.    No Known Allergies      Current Outpatient Medications:   •  amLODIPine (NORVASC) 10 MG tablet, TAKE 1 TABLET EVERY DAY (Patient taking differently: Take 1 tablet by mouth Daily.), Disp: 90 tablet, Rfl: 1  •  aspirin 81 MG EC tablet, Take 1 tablet twice daily x 14 days; then take 1 daily x 28 days (Patient taking differently: Take 1 tablet by mouth Daily. NONE IN THE PAST 3 WEEKS), Disp: 60 tablet, Rfl: 0  •  atorvastatin (LIPITOR) 20 MG tablet, TAKE 1 TABLET EVERY DAY (Patient taking differently: Take 1 tablet by mouth Daily.), Disp: 90 tablet, Rfl: 1  •  hydroCHLOROthiazide (MICROZIDE) 12.5 MG capsule, TAKE 1 CAPSULE EVERY DAY (Patient taking differently: Take 1 capsule by mouth Every Morning.), Disp: 90 capsule, Rfl: 1  •  loperamide (IMODIUM) 2 MG capsule, TAKE 4 CAPSULES BY MOUTH DAILY., Disp: 360 capsule, Rfl: 1  •  magnesium oxide (MAG-OX) 400 tablet tablet, Take 1 tablet by mouth Daily., Disp: , Rfl:   •  oxybutynin (DITROPAN) 5 MG tablet, Take 1 tablet by mouth 2 (Two) Times a Day. Urge urination, Disp: 180 tablet, Rfl: 2  •  sertraline (ZOLOFT) 100 MG tablet, TAKE 2 TABLETS EVERY DAY (Patient taking differently: Take 2 tablets by mouth Daily.), Disp: 180 tablet, Rfl: 1  •  traZODone (DESYREL) 100 MG tablet, TAKE 1 TABLET EVERY NIGHT (Patient taking  "differently: Take 1 tablet by mouth Every Night.), Disp: 90 tablet, Rfl: 1      Objective:     Vitals:    05/31/23 1105   BP: 104/60   BP Location: Right arm   Pulse: 65   SpO2: 98%   Weight: 76 kg (167 lb 9.6 oz)   Height: 157.5 cm (62\")     Vitals:    05/31/23 1205 05/31/23 1206 05/31/23 1209   Orthostatic BP: 120/70 118/72 112/76   Orthostatic Pulse: 65 78 78   Patient Position: Lying Standing Standing       Body mass index is 30.65 kg/m².    Physical Exam  Vitals reviewed.   Constitutional:       Appearance: She is well-developed.   HENT:      Head: Normocephalic.      Nose: Nose normal.      Mouth/Throat:      Comments: masked  Eyes:      Conjunctiva/sclera: Conjunctivae normal.   Neck:      Vascular: No JVD.   Cardiovascular:      Rate and Rhythm: Normal rate and regular rhythm.      Pulses: Normal pulses and intact distal pulses.      Heart sounds: Normal heart sounds. No murmur heard.  Pulmonary:      Effort: Pulmonary effort is normal.      Breath sounds: Normal breath sounds.   Abdominal:      Palpations: Abdomen is soft.      Tenderness: There is no abdominal tenderness.   Musculoskeletal:         General: No swelling. Normal range of motion.      Cervical back: Normal range of motion.   Skin:     General: Skin is warm and dry.      Findings: No rash.   Neurological:      General: No focal deficit present.      Mental Status: She is alert.      Cranial Nerves: No cranial nerve deficit.   Psychiatric:         Mood and Affect: Mood normal.         Behavior: Behavior normal.           ECG 12 Lead    Date/Time: 5/31/2023 3:12 PM  Performed by: Thomas Estrada MD  Authorized by: Thomas Estrada MD   Comparison: compared with previous ECG   Similar to previous ECG  Rhythm: sinus rhythm  Conduction: conduction normal  Conduction comments: rsr'  ST Segments: ST segments normal  T Waves: T waves normal  QRS axis: normal  Other: no other findings    Clinical impression: normal ECG              Assessment:       " Diagnosis Plan   1. Lightheadedness  Adult Transthoracic Echo Complete W/ Cont if Necessary Per Protocol    Comprehensive Metabolic Panel    CBC & Differential    proBNP    TSH      2. Dyspnea on exertion  Adult Transthoracic Echo Complete W/ Cont if Necessary Per Protocol    Comprehensive Metabolic Panel    CBC & Differential    proBNP    TSH      3. Hypoxia  Adult Transthoracic Echo Complete W/ Cont if Necessary Per Protocol    Comprehensive Metabolic Panel    CBC & Differential    proBNP    TSH      4. Leg swelling  Adult Transthoracic Echo Complete W/ Cont if Necessary Per Protocol    Comprehensive Metabolic Panel    CBC & Differential    proBNP    TSH      5. Labile blood pressure  Adult Transthoracic Echo Complete W/ Cont if Necessary Per Protocol    Comprehensive Metabolic Panel    CBC & Differential    proBNP    TSH      6. Fatigue, unspecified type  Adult Transthoracic Echo Complete W/ Cont if Necessary Per Protocol    Comprehensive Metabolic Panel    CBC & Differential    proBNP    TSH      7. Myocardial bridge        8. Coronary sinus abnormality        9. Essential hypertension             Plan:       Ms Aleman has longstanding fatigue and shortness of breath.  In 2020, she had an extensive work-up including a right and left heart cath, cardiac CTA, and transesophageal echocardiogram. And while we found some minor abnormalities, we did not find anything that would cause profound fatigue or dyspnea. She has normal LV systolic function, and normal filling pressures.  She does not have significant valvular disease.  She has a very mild LAD bridge but no significant CAD.  She was reported to have right-sided chamber enlargement in the past but her most recent echo showed that they were both normal.  She has a dilated coronary sinus but this is a normal variant in her case.    She now says that she is profoundly fatigued and has profound exercise intolerance, shortness of breath, and lightheadedness.  We  checked her oxygen saturation in the office today which was 98% at rest and 95% with ambulation.  We performed orthostatics, which were essentially fine.  Her blood pressure was 120/70 while lying flat and 112/76 when standing.  I have a suspicion that her home oximeter and blood pressure cuff are actually reading incorrectly given the significant difference between her values and what we received today.  Also, her lung exam is normal, with no evidence of edema or infiltrate.    I did send her for basic labs which show a normal BMP, completely normal proBNP of 340, and normal TSH of 0.5.  Her CBC is normal as well.     Just to be thorough, I am going to repeat an echocardiogram.  This will serve as a baseline study in case she is going to require antineoplastic chemotherapy or immunotherapy.  I asked her to report to the ER if she felt that her condition was getting worse, although to be honest, I do not have a cardiac explanation for why she feels so poorly at this time.  I am going to have her bring in her home oximeter and blood pressure cuff to see if they are correct when we check them.  I asked her to hold amlodipine as her blood pressure is a bit on the low side.    Sincerely,       Thomas Estrada MD                Answers for HPI/ROS submitted by the patient on 4/23/2020   Shortness of breath  What is the primary reason for your visit?: Shortness of Breath  Chronicity: chronic  Onset: more than 1 year ago  Frequency: every few minutes  Progression since onset: gradually worsening  Episode duration: 10 minutes  coryza: No  Aggravating factors: any activity

## 2023-06-01 ENCOUNTER — TELEPHONE (OUTPATIENT)
Dept: ONCOLOGY | Facility: CLINIC | Age: 78
End: 2023-06-01

## 2023-06-01 ENCOUNTER — TELEPHONE (OUTPATIENT)
Dept: SURGERY | Facility: CLINIC | Age: 78
End: 2023-06-01

## 2023-06-01 DIAGNOSIS — C50.919 MALIGNANT NEOPLASM OF FEMALE BREAST, UNSPECIFIED ESTROGEN RECEPTOR STATUS, UNSPECIFIED LATERALITY, UNSPECIFIED SITE OF BREAST: Primary | ICD-10-CM

## 2023-06-01 NOTE — TELEPHONE ENCOUNTER
Returned call to Ms Aleman. Advised her that her pathology report is available to Dr Stauffer and she will review this with her at her appt in the morning in detail.  She asked if I could release the results to her and I explained that this is not something that is done in the office and that certain results are on a delayed release to [facility] in order for the physician to review with her first.  She also inquired about labs tomorrow. Reviewed her chart and note that she just had labs drawn yesterday and advised her she will not need to have those repeated.  She voiced understanding, thankful for the call.

## 2023-06-01 NOTE — TELEPHONE ENCOUNTER
Caller: Joi Aleman    Relationship: Self    Best call back number: [phone]    What is the best time to reach you: ANYTIME    Who are you requesting to speak with (clinical staff, provider,  specific staff member): CLINICAL     What was the call regarding: PATIENT CALLING WANTING TO KNOW IF YOU HAVE THE RESULTS OF HER LUMPECTOMY?     CALL TO DISCUSS      Is it okay if the provider responds through MyChart: N/A

## 2023-06-01 NOTE — TELEPHONE ENCOUNTER
Patient called wanting a call back regarding her results.    Called Joi Aleman regarding recent surgical pathology.    Left breast multifocal invasive ductal carcinoma, grade I, margins negative, ER+/[place]+, her2-. 0/3 lymph nodes.   [id number]    Referral placed for medical oncology, radiation oncology.   Follow up in two weeks for wound check and further cancer surveillance planning.     Alissa Allen MD

## 2023-06-02 ENCOUNTER — CONSULT (OUTPATIENT)
Dept: ONCOLOGY | Facility: CLINIC | Age: 78
End: 2023-06-02

## 2023-06-02 VITALS
HEART RATE: 63 BPM | TEMPERATURE: 98.2 F | BODY MASS INDEX: 30.91 KG/M2 | WEIGHT: 168 LBS | HEIGHT: 62 IN | DIASTOLIC BLOOD PRESSURE: 71 MMHG | RESPIRATION RATE: 18 BRPM | SYSTOLIC BLOOD PRESSURE: 121 MMHG

## 2023-06-02 DIAGNOSIS — Z17.0 MALIGNANT NEOPLASM OF UPPER-OUTER QUADRANT OF LEFT BREAST IN FEMALE, ESTROGEN RECEPTOR POSITIVE: Primary | ICD-10-CM

## 2023-06-02 DIAGNOSIS — C50.412 MALIGNANT NEOPLASM OF UPPER-OUTER QUADRANT OF LEFT BREAST IN FEMALE, ESTROGEN RECEPTOR POSITIVE: Primary | ICD-10-CM

## 2023-06-02 PROCEDURE — 3078F DIAST BP <80 MM HG: CPT | Performed by: INTERNAL MEDICINE

## 2023-06-02 PROCEDURE — 1160F RVW MEDS BY RX/DR IN RCRD: CPT | Performed by: INTERNAL MEDICINE

## 2023-06-02 PROCEDURE — 99204 OFFICE O/P NEW MOD 45 MIN: CPT | Performed by: INTERNAL MEDICINE

## 2023-06-02 PROCEDURE — 3074F SYST BP LT 130 MM HG: CPT | Performed by: INTERNAL MEDICINE

## 2023-06-02 PROCEDURE — 1125F AMNT PAIN NOTED PAIN PRSNT: CPT | Performed by: INTERNAL MEDICINE

## 2023-06-02 PROCEDURE — 1159F MED LIST DOCD IN RCRD: CPT | Performed by: INTERNAL MEDICINE

## 2023-06-02 RX ORDER — ALENDRONATE SODIUM 70 MG/1
70 TABLET ORAL
Qty: 12 TABLET | Refills: 3 | Status: SHIPPED | OUTPATIENT
Start: 2023-06-02

## 2023-06-02 RX ORDER — ANASTROZOLE 1 MG/1
1 TABLET ORAL DAILY
Qty: 90 TABLET | Refills: 3 | Status: SHIPPED | OUTPATIENT
Start: 2023-06-02

## 2023-06-02 NOTE — PROGRESS NOTES
Subjective   Joi Aleman is a 77 y.o. female.  Referred by Dr. Allen for left breast invasive ductal carcinoma.    History of Present Illness   Ms. Aleman is a 77-year-old lady with hypertension, hyperlipidemia presented with a screen detected abnormality of the left breast.    2/22/2023-bilateral screening mammogram  Left breast focal asymmetry with questioned architectural distortion.  No mammographic evidence of malignancy in the right breast.    3/29/2023-left breast diagnostic mammogram and ultrasound  There is a 0.4 cm hypoechoic mass with ill-defined margins at 2:00, 8 cm from the nipple in the left breast.  Ultrasound-guided biopsy recommended.    4/24/2023-ultrasound-guided biopsy-pathology consistent with invasive ductal carcinoma, grade 1, associated atypical ductal hyperplasia and focal atypical lobular hyperplasia, negative for lymphovascular space invasion.  ER +% strong, IL +% strong  HER2 negative  Ki-67 10%    5/27/2023-left breast lumpectomy  Pathology consistent with multifocal invasive ductal carcinoma  3 foci each measuring 2 mm  3 sentinel lymph nodes negative  Grade 1  ER/IL strongly positive HER2 negative  Margins negative    The following portions of the patient's history were reviewed and updated as appropriate: allergies, current medications, past family history, past medical history, past social history, past surgical history and problem list.    Past Medical History:   Diagnosis Date   • Agatston CAC score, <100     2020: 29   • Anxiety and depression    • Arthritis    • Brain aneurysm     FOLLOWED AND THEN RELEASED HER YEARS AGO-NO ISSUES CURRENLTY   • Breast cancer in female     LEFT   • Frequent urination    • Hyperlipidemia    • Hypertension    • Insomnia    • Knee pain, bilateral     AT TIMES   • Memory change     SINCE STROKE. UNABLE TO REMEBER LIST OF INSTUCTIONS   • Myocardial bridge     very mild, mid-LAD   • Osteoarthritis    • SOB (shortness of breath) on  exertion    • Stroke Years ago    About 8 to 10 years ago        Past Surgical History:   Procedure Laterality Date   • BLEPHAROPLASTY     • BREAST BIOPSY Left 04/24/2023   • BREAST LUMPECTOMY WITH SENTINEL NODE BIOPSY Left 5/25/2023    Procedure: left breast [place] guided lumpectomy with sentinel lymph node biopsy;  Surgeon: Alissa Allen MD;  Location: [facility];  Service: General;  Laterality: Left;   • CARDIAC CATHETERIZATION N/A 05/12/2020    Procedure: Coronary angiography;  Surgeon: Víctor Hernandez MD;  Location:  [facility] CATH INVASIVE LOCATION;  Service: Cardiovascular;  Laterality: N/A;   • CARDIAC CATHETERIZATION N/A 05/12/2020    Procedure: Left heart cath;  Surgeon: Víctor Hernandez MD;  [facility];  Service: Cardiovascular;  Laterality: N/A;   • CARDIAC CATHETERIZATION N/A 05/12/2020    Procedure: Left ventriculography;  Surgeon: Víctor Hernandez MD;  [facility];  Service: Cardiovascular;  Laterality: N/A;   • CARDIAC CATHETERIZATION N/A 05/12/2020    Procedure: Right heart cath with shunt run;  Surgeon: Víctor Hernandez MD;  Location: [facility] CATH INVASIVE LOCATION;  Service: Cardiovascular;  Laterality: N/A;   • CARPAL TUNNEL RELEASE Right    • COLON SURGERY      COLON RESECTION FOR ENDOMETROSIS   • COLONOSCOPY      normal less than 10 years ago   • EYE SURGERY      Cataracts   • HYSTERECTOMY     • OOPHORECTOMY     • TOE SURGERY Right     right big toe replecemnet 12 years ago   • TONSILLECTOMY     • TOTAL KNEE ARTHROPLASTY Right 06/02/2021    Procedure: TOTAL KNEE ARTHROPLASTY, with left knee steroid injection;  Surgeon: Shamar Boone MD;  Location: [facility] MAIN OR;  Service: Orthopedics;  Laterality: Right;        Family History   Problem Relation Age of Onset   • Breast cancer Mother 76   • Arthritis Mother    • Hypertension Mother    • Heart disease Father         And his father   • Heart attack Father    • Hyperlipidemia  "Father    • No Known Problems Sister    • Heart disease Sister    • No Known Problems Brother    • Prostate cancer Maternal Grandfather    • Heart disease Paternal Grandfather    • Heart attack Paternal Grandfather    • Malig Hyperthermia Neg Hx         Social History     Socioeconomic History   • Marital status: Single   • Number of children: 2   Tobacco Use   • Smoking status: Former     Packs/day: 1.50     Years: 15.00     Pack years: 22.50     Types: Cigarettes     Start date: 1964     Quit date: 1977     Years since quittin.4   • Smokeless tobacco: Never   Vaping Use   • Vaping Use: Never used   Substance and Sexual Activity   • Alcohol use: Yes     Comment: Once a month   • Drug use: Never   • Sexual activity: Not Currently     Partners: Male     Birth control/protection: None        OB History        2    Para   2    Term   2            AB        Living           SAB        IAB        Ectopic        Molar        Multiple        Live Births                 Age at menarche-14   3 para 2  1  Age at menopause-32    No Known Allergies       Review of Systems   Constitutional: Negative.    HENT: Negative.    Eyes: Negative.    Respiratory: Negative.    Cardiovascular: Negative.    Gastrointestinal: Negative.    Endocrine: Negative.    Genitourinary: Negative.    Musculoskeletal: Negative.    Skin: Negative.    Allergic/Immunologic: Negative.    Neurological: Negative.    Hematological: Negative.    Psychiatric/Behavioral: Negative.          Objective   Blood pressure 121/71, pulse 63, temperature 98.2 °F (36.8 °C), temperature source Temporal, resp. rate 18, height 157.5 cm (62.01\"), weight 76.2 kg (168 lb).   Physical Exam  Vitals reviewed.   Constitutional:       Appearance: Normal appearance. She is normal weight.   HENT:      Head: Normocephalic and atraumatic.      Right Ear: External ear normal.      Left Ear: External ear normal.      Nose: Nose normal.      " Mouth/Throat:      Pharynx: Oropharynx is clear.   Eyes:      Extraocular Movements: Extraocular movements intact.      Pupils: Pupils are equal, round, and reactive to light.   Cardiovascular:      Rate and Rhythm: Normal rate.      Pulses: Normal pulses.   Pulmonary:      Effort: Pulmonary effort is normal.   Abdominal:      General: Abdomen is flat.   Musculoskeletal:         General: Normal range of motion.      Cervical back: Normal range of motion.   Skin:     General: Skin is warm.   Neurological:      General: No focal deficit present.      Mental Status: She is alert. Mental status is at baseline.   Psychiatric:         Mood and Affect: Mood normal.         Behavior: Behavior normal.         Thought Content: Thought content normal.         Judgment: Judgment normal.       Breast Exam: Right breast appears normal on inspection.  No palpable abnormalities of the right breast.  Left breast incision at left breast upper outer quadrant extending into the left axilla  is healing well.    Hospital Outpatient Visit on 05/31/2023   Component Date Value Ref Range Status   • Glucose 05/31/2023 92  65 - 99 mg/dL Final   • BUN 05/31/2023 19  8 - 23 mg/dL Final   • Creatinine 05/31/2023 0.88  0.57 - 1.00 mg/dL Final   • Sodium 05/31/2023 135 (L)  136 - 145 mmol/L Final   • Potassium 05/31/2023 3.8  3.5 - 5.2 mmol/L Final   • Chloride 05/31/2023 98  98 - 107 mmol/L Final   • CO2 05/31/2023 26.0  22.0 - 29.0 mmol/L Final   • Calcium 05/31/2023 9.3  8.6 - 10.5 mg/dL Final   • Total Protein 05/31/2023 6.4  6.0 - 8.5 g/dL Final   • Albumin 05/31/2023 4.1  3.5 - 5.2 g/dL Final   • ALT (SGPT) 05/31/2023 30  1 - 33 U/L Final   • AST (SGOT) 05/31/2023 39 (H)  1 - 32 U/L Final   • Alkaline Phosphatase 05/31/2023 86  39 - 117 U/L Final   • Total Bilirubin 05/31/2023 0.5  0.0 - 1.2 mg/dL Final   • Globulin 05/31/2023 2.3  gm/dL Final   • A/G Ratio 05/31/2023 1.8  g/dL Final   • BUN/Creatinine Ratio 05/31/2023 21.6  7.0 - 25.0 Final    • Anion Gap 05/31/2023 11.0  5.0 - 15.0 mmol/L Final   • eGFR 05/31/2023 68.6  >60.0 mL/min/1.73 Final   • proBNP 05/31/2023 342.0  0.0 - 1,800.0 pg/mL Final   • TSH 05/31/2023 0.501  0.270 - 4.200 uIU/mL Final   • WBC 05/31/2023 8.41  3.40 - 10.80 10*3/mm3 Final   • RBC 05/31/2023 4.36  3.77 - 5.28 10*6/mm3 Final   • Hemoglobin 05/31/2023 12.9  12.0 - 15.9 g/dL Final   • Hematocrit 05/31/2023 39.3  34.0 - 46.6 % Final   • MCV 05/31/2023 90.1  79.0 - 97.0 fL Final   • MCH 05/31/2023 29.6  26.6 - 33.0 pg Final   • MCHC 05/31/2023 32.8  31.5 - 35.7 g/dL Final   • RDW 05/31/2023 13.2  12.3 - 15.4 % Final   • RDW-SD 05/31/2023 43.1  37.0 - 54.0 fl Final   • MPV 05/31/2023 8.8  6.0 - 12.0 fL Final   • Platelets 05/31/2023 268  140 - 450 10*3/mm3 Final   • Neutrophil % 05/31/2023 64.4  42.7 - 76.0 % Final   • Lymphocyte % 05/31/2023 22.2  19.6 - 45.3 % Final   • Monocyte % 05/31/2023 10.3  5.0 - 12.0 % Final   • Eosinophil % 05/31/2023 1.9  0.3 - 6.2 % Final   • Basophil % 05/31/2023 0.6  0.0 - 1.5 % Final   • Immature Grans % 05/31/2023 0.6 (H)  0.0 - 0.5 % Final   • Neutrophils, Absolute 05/31/2023 5.41  1.70 - 7.00 10*3/mm3 Final   • Lymphocytes, Absolute 05/31/2023 1.87  0.70 - 3.10 10*3/mm3 Final   • Monocytes, Absolute 05/31/2023 0.87  0.10 - 0.90 10*3/mm3 Final   • Eosinophils, Absolute 05/31/2023 0.16  0.00 - 0.40 10*3/mm3 Final   • Basophils, Absolute 05/31/2023 0.05  0.00 - 0.20 10*3/mm3 Final   • Immature Grans, Absolute 05/31/2023 0.05  0.00 - 0.05 10*3/mm3 Final   • nRBC 05/31/2023 0.0  0.0 - 0.2 /100 WBC Final   Admission on 05/25/2023, Discharged on 05/25/2023   Component Date Value Ref Range Status   • Case Report 05/25/2023    Final                    Value:Surgical Pathology Report                         Case: [id number]                                  Authorizing Provider:  Alissa Allen MD      Collected:           05/25/2023 02:07 PM          Ordering Location:     [facility]   Received:            05/25/2023 02:27 PM                                 [facility] OR                                                                       Pathologist:           Padma Bolden MD                                                          Specimens:   1) - Breast, Left, left breast lumpectomy, short stitch superior, long stitch                       lateral, double stitch anterior                                                                     2) - Breast, Left, left breast additional superior margin, stitch at true margin                    3) - Breast, Left, left breast additional inferior margin, stitch at true margin                    4) - Breast, Left, left breast additional anterior margin, stitch at true margin                                              5) - Breast, Left, left breast additional lateral margin, stitch at true margin                     6) - Breast, Left, left breast additional medial margin, stitch at true margin                      7) - Breast, Left, left breast additional posterior margin, stitch at true margin                   8) - [place] Lymph Node, left sentinel lymph node #1 hot 30                                        9) - [place] Lymph Node, left sentinel lymph node #2 hot 720                             • Clinical Information 05/25/2023    Final                    Value:This result contains rich text formatting which cannot be displayed here.   • Final Diagnosis 05/25/2023    Final                    Value:This result contains rich text formatting which cannot be displayed here.   • Synoptic Checklist 05/25/2023    Final                    Value:INVASIVE CARCINOMA OF THE BREAST: Resection                            INVASIVE CARCINOMA OF THE BREAST: COMPLETE EXCISION - All Specimens                            8th Edition - Protocol posted: 3/22/2023                                                        SPECIMEN                               Procedure:     Excision (less than total mastectomy)                                Specimen Laterality:    Left                                                         TUMOR                               Tumor Site:    Clock position                                :    2 o'clock                              Histologic Type:    Invasive carcinoma of no special type (ductal)                              Histologic Grade ([place] Histologic Score):                                   Glandular (Acinar) / Tubular Differentiation:    Score 2                                Nuclear Pleomorphism:    Score 1                                Mitotic Rate:    Score 1                                Overall Grade:    Grade 1 (scores of 3, 4 or 5)                              Tumor Size:    Greatest dimension of largest invasive focus (Millimeters): 2 mm                             Tumor Focality:    Multiple foci of invasive carcinoma                                Number of Foci:    3                                Sizes of Individual Foci in Millimeters (mm):    2 mm; 2 mm; 2 mm                              Ductal Carcinoma In Situ (DCIS):    Present                                :    Negative for extensive intraductal component (EIC)                                Architectural Patterns:    Cribriform                                Architectural Patterns:    Micropapillary                                Nuclear Grade:    Grade I (low)                                Necrosis:    Not identified                              Lobular Carcinoma In Situ (LCIS):    Not identified                              Lymphatic and / or Vascular Invasion:    Not identified                              Dermal Lymphatic and / or Vascular Invasion:    No skin present                              Microcalcifications:    Present in invasive carcinoma                              Microcalcifications:    Present in non-neoplastic tissue                               Treatment Effect in the Breast:    No known presurgical therapy                                                         MARGINS                             Margin Status for Invasive Carcinoma:    All margins negative for invasive carcinoma                                Distance from Invasive Carcinoma to Closest Margin:    4 mm                               Closest Margin(s) to Invasive Carcinoma:    Medial                                Distance from Invasive Carcinoma to Anterior Margin:    23 mm                               Distance from Invasive Carcinoma to Posterior Margin:    11 mm                               Distance from Invasive Carcinoma to Superior Margin:    25 mm                               Distance from Invasive Carcinoma to Inferior Margin:    25 mm                               Distance from Invasive Carcinoma to Medial Margin:    4 mm                               Distance from Invasive Carcinoma to Lateral Margin:    19 mm                             Margin Status for DCIS:    All margins negative for DCIS                                Distance from DCIS to Closest Margin:    10.7 mm                               Closest Margin(s) to DCIS:    Anterior                                Distance from DCIS to Anterior Margin:    10.7 mm                               Distance from DCIS to Posterior Margin:    24 mm                               Distance from DCIS to Superior Margin:    25 mm                               Distance from DCIS to Inferior Margin:    25 mm                               Distance from DCIS to Medial Margin:    12.3 mm                               Distance from DCIS to Lateral Margin:    23 mm                                                        REGIONAL LYMPH NODES                             Regional Lymph Node Status:                                   :    All regional lymph nodes negative for tumor                                Total Number of Lymph Nodes  Examined (sentinel and non-sentinel):    3                                Number of [place] Nodes Examined:    3                                                         pTNM CLASSIFICATION (AJCC 8th Edition)                               Reporting of pT, pN, and (when applicable) pM categories is based on information available to the pathologist at the time the report is issued. As per the AJCC (Chapter 1, 8th Ed.) it is the managing physician’s responsibility to establish the final pathologic stage based upon all pertinent information, including but potentially not limited to this pathology report.                             pT Category:    pT1a                              T Suffix:    (m)                              pN Category:    pN0                              N Suffix:    (sn)                                                         ADDITIONAL FINDINGS                             Additional Findings:    Atypical lobular hyperplasia                                                         SPECIAL STUDIES                               Estrogen Receptor (ER) Status:    Positive (greater than 10% of cells demonstrate nuclear positivity)                                  Percentage of Cells with Nuclear Positivity:    %                                Progesterone Receptor (PgR) Status:    Positive                                  Percentage of Cells with Nuclear Positivity:    %                                HER2 (by immunohistochemistry):    Negative (Score 1+)                                Ki-67 Percentage of Positive Nuclei:    10 %                               Testing Performed on Case Number:    [id number]                             Breast Biomarker Reporting Template                            BREAST: BIOMARKER REPORTING TEMPLATE - 1                            Protocol posted: 3/22/2023                                                           Test(s) Performed:                                      Estrogen Receptor (ER) Status:    Positive (greater than 10% of cells demonstrate nuclear positivity)                                    Percentage of Cells with Nuclear Positivity:    %                                    Average Intensity of Staining:    Strong                                  Test Type:    Food and Drug Administration (FDA) cleared (test / vendor): [employer]                                  Primary Antibody:    SP1                                  Scoring System:    Fidelia                                    Proportion Score:    5                                    Intensity Score:    3                                    Total Fidelia Score:    8                                Test(s) Performed:                                     Progesterone Receptor (PgR) Status:    Positive                                    Percentage of Cells with Nuclear Positivity:    %                                    Average Intensity of Staining:    Moderate                                  Test Type:    Food and Drug Administration (FDA) cleared (test / vendor): [employer]                                  Primary Antibody:    1E2                                  Scoring System:    Fidelia                                    Proportion Score:    5                                    Intensity Score:    2                                    Total Fidelia Score:    7                                Test(s) Performed:                                     HER2 by Immunohistochemistry:    Negative (Score 1+)                                  Test Type:    Food and Drug Administration (FDA) cleared (test / vendor): [employer]                                  Primary Antibody:    4B5                                Test(s) Performed:    Ki-67                                  Ki-67 Percentage of Positive Nuclei:    9 %                                 Primary Antibody:    30-9                                Cold Ischemia and  Fixation Times:    Meet requirements specified in latest version of the ASCO / CAP Guidelines                                Cold Ischemia Time (minutes):    20 min                               Fixation Time (hours):    12.25 hours                               Testing Performed on Block Number(s):    1D                                                         METHODS                               Fixative:    Formalin                                Image Analysis:    Not performed                             Breast Biomarker Reporting Template                            BREAST: BIOMARKER REPORTING TEMPLATE - 6                            Protocol posted: 3/22/2023                                                           Test(s) Performed:                                     Estrogen Receptor (ER) Status:    Positive (greater than 10% of cells demonstrate nuclear positivity)                                    Percentage of Cells with Nuclear Positivity:    %                                    Average Intensity of Staining:    Strong                                  Test Type:    Food and Drug Administration (FDA) cleared (test / vendor): [employer]                                  Primary Antibody:    SP1                                  Scoring System:    Fidelia                                    Proportion Score:    5                                    Intensity Score:    3                                    Total Fidelia Score:    8                                Test(s) Performed:                                     Progesterone Receptor (PgR) Status:    Positive                                    Percentage of Cells with Nuclear Positivity:    %                                    Average Intensity of Staining:    Strong                                  Test Type:    Food and Drug Administration (FDA) cleared (test / vendor): [employer]                                  Primary Antibody:    1E2                                   Scoring System:    Fidelia                                    Proportion Score:    5                                    Intensity Score:    3                                    Total Fidelia Score:    8                                Test(s) Performed:                                     HER2 by Immunohistochemistry:    Negative (Score 1+)                                  Test Type:    Food and Drug Administration (FDA) cleared (test / vendor): [employer]                                  Primary Antibody:    4B5                                Test(s) Performed:    Ki-67                                  Ki-67 Percentage of Positive Nuclei:    10 %                                 Primary Antibody:    30-9                                Cold Ischemia and Fixation Times:    Meet requirements specified in latest version of the ASCO / CAP Guidelines                                Testing Performed on Block Number(s):    6A                                                         METHODS                               Fixative:    Formalin                                Image Analysis:    Not performed      • Comment 05/25/2023    Final                    Value:This result contains rich text formatting which cannot be displayed here.   • Gross Description 05/25/2023    Final                    Value:This result contains rich text formatting which cannot be displayed here.   • Special Stains 05/25/2023    Final                    Value:This result contains rich text formatting which cannot be displayed here.   Pre-Admission Testing on 05/18/2023   Component Date Value Ref Range Status   • Glucose 05/18/2023 109 (H)  65 - 99 mg/dL Final   • BUN 05/18/2023 16  8 - 23 mg/dL Final   • Creatinine 05/18/2023 1.12 (H)  0.57 - 1.00 mg/dL Final   • Sodium 05/18/2023 137  136 - 145 mmol/L Final   • Potassium 05/18/2023 3.6  3.5 - 5.2 mmol/L Final   • Chloride 05/18/2023 99  98 - 107 mmol/L Final   • CO2 05/18/2023 31.2  (H)  22.0 - 29.0 mmol/L Final   • Calcium 05/18/2023 9.9  8.6 - 10.5 mg/dL Final   • BUN/Creatinine Ratio 05/18/2023 14.3  7.0 - 25.0 Final   • Anion Gap 05/18/2023 6.8  5.0 - 15.0 mmol/L Final   • eGFR 05/18/2023 51.4 (L)  >60.0 mL/min/1.73 Final   • WBC 05/18/2023 5.86  3.40 - 10.80 10*3/mm3 Final   • RBC 05/18/2023 4.00  3.77 - 5.28 10*6/mm3 Final   • Hemoglobin 05/18/2023 12.1  12.0 - 15.9 g/dL Final   • Hematocrit 05/18/2023 36.3  34.0 - 46.6 % Final   • MCV 05/18/2023 90.8  79.0 - 97.0 fL Final   • MCH 05/18/2023 30.3  26.6 - 33.0 pg Final   • MCHC 05/18/2023 33.3  31.5 - 35.7 g/dL Final   • RDW 05/18/2023 12.9  12.3 - 15.4 % Final   • RDW-SD 05/18/2023 41.9  37.0 - 54.0 fl Final   • MPV 05/18/2023 9.1  6.0 - 12.0 fL Final   • Platelets 05/18/2023 212  140 - 450 10*3/mm3 Final   • QT Interval 05/18/2023 500  ms Final   Hospital Outpatient Visit on 05/07/2023   Component Date Value Ref Range Status   • Creatinine 05/07/2023 1.20  0.60 - 1.30 mg/dL Final    Serial Number: [id number]:  [id number]        Mammo Diagnostic Left With CAD    Result Date: 5/18/2023   1. Ultrasound-guided preoperative Chelsea  localization of biopsy-proven left breast malignancy marked with a bowtie clip. Correlation with final histopathology is recommended.   This report was finalized on 5/18/2023 11:00 AM by Dr. Georgia Fernandez M.D.      MRI Breast Bilateral With & Without Contrast    Result Date: 5/8/2023  1. Biopsy-proven malignancy in the left breast at the 2-o'clock position represented by the bowtie-shaped metallic clip within a 1.5 cm postbiopsy cavity. No suspicious residual enhancement is seen at this location or in other areas of the left breast and there is no evidence for left axillary adenopathy. 2. There are no findings suspicious for malignancy in the right breast.  BI-RADS category 6: Known biopsy-proven malignancy.  This report was finalized on 5/8/2023 5:42 PM by Dr. Ulysses Fallon M.D.      US Device Placement  Breast Without Biopsy 1st    Result Date: 5/18/2023   1. Ultrasound-guided preoperative Chelsea  localization of biopsy-proven left breast malignancy marked with a bowtie clip. Correlation with final histopathology is recommended.   This report was finalized on 5/18/2023 11:00 AM by Dr. Georgia Fernandez M.D.           Assessment & Plan       *Left breast invasive ductal carcinoma  · pT1 aN0 M0, stage Ia, grade 1, ER/WY strongly positive, HER2 negative, Ki-67 10%  Discussed at length the details of imaging and pathology report.Discussed the origin of breast cancer from the ducts and the lobules and the histological type of breast cancer based on site of origin. Discussed the tumor size, lymph node status and stage of the cancer. Explained the presence of DCIS. Discussed the receptor status including ER, WY and her-2 emely and their significance in determining the biology and treatment. Also discussed the importance of grade and ki-67.   The steps of curative intent breast cancer treatment have been explained, including surgery, possible radiation and  endocrine therapy.   Given the small size of the tumor and strong ER/WY positivity and her age there is likely not much benefit from radiation.  Discussed referring to radiation of oncology however patient does not want to do so.  · We discussed the risk of recurrence and adjuvant endocrine therapy  · Offered anastrozole 1 mg p.o. daily.  · Adverse effects including but not limited to hot flashes, mood changes, fatigue, arthralgias and myalgias, decrease in bone mineral density.    *Bone health  · 2/22/2023-DEXA shows osteopenia with a T score of -2.2 in the right hip and -1.2 in the left hip.  · Discussed starting Fosamax weekly to help with bone density.  · Adverse effects discussed.  · Continue vitamin D.    *Hypertension-continue hydrochlorothiazide and amlodipine    *Hyperlipidemia-continue atorvastatin    *Follow-up-6 months, patient counseled to call the clinic if  she is having any adverse effects from anastrozole.

## 2023-06-05 ENCOUNTER — OFFICE VISIT (OUTPATIENT)
Dept: SURGERY | Facility: CLINIC | Age: 78
End: 2023-06-05
Payer: MEDICARE

## 2023-06-05 ENCOUNTER — PATIENT OUTREACH (OUTPATIENT)
Dept: OTHER | Facility: HOSPITAL | Age: 78
End: 2023-06-05
Payer: MEDICARE

## 2023-06-05 VITALS — WEIGHT: 169.2 LBS | BODY MASS INDEX: 31.14 KG/M2 | HEIGHT: 62 IN

## 2023-06-05 DIAGNOSIS — C50.919 MALIGNANT NEOPLASM OF FEMALE BREAST, UNSPECIFIED ESTROGEN RECEPTOR STATUS, UNSPECIFIED LATERALITY, UNSPECIFIED SITE OF BREAST: Primary | ICD-10-CM

## 2023-06-05 NOTE — PROGRESS NOTES
Called MsRosanna Ash to see how she was doing. She stated she is recovering from surgery and is doing well. She will start PT soon. She stated she has no needs at this time. She was thankful for the call and will reach out if any questions or needs arise.

## 2023-06-05 NOTE — PROGRESS NOTES
General Surgery Breast Cancer History and Physical Exam     Summary:    Joi Aleman is a 77 y.o. lady who presents with a history of left breast invasive ductal carcinoma: Grade I,  ER+/KS+, Her2-, Ki67 10%; [id number], Stage I.      A multidisciplinary plan has been formulated for the patient:    (1) Breast Surgical Oncology:  -Inv[facility]ae 9 panel genetic testing: negative.   -S/p left breast wire localized lumpectomy with sentinel lymph node biopsy 5/2023.  -Lymphedema clinic referral.  -Annual mammogram due 2/2024.   -Follow up 3/2024.    (2) Medical Oncology:  -Following with Dr. Stauffer.     (3) Radiation Oncology:  -Declines evaluation by  radiation therapy.    Referring Provider: No ref. provider found    Chief Complaint: abnormal breast imaging    History of Present Illness: Ms. Joi Aleman is a 77 y.o. year old lady, seen at the request of No ref. provider found for a new diagnosis of left breast cancer.      This was initially detected as an imaging abnormality. She has not had a mammogram for a few years. She denies any prior history of abnormal mammograms or breast biopsies. Her work-up is detailed in the oncologic history below.     She denies any breast lumps, pain, skin changes, or nipple discharge. She has a family history of breast cancer in her mother (age 77). She denies any family history of ovarian cancer.     6/5/2023 She presents today for follow up. She has done well since surgery. Her pain is controlled. She is getting back to her daily activities.     Workup of Current Diagnosis:    2/22/2023 Bilateral Screening Mammogram:  IMPRESSION/RECOMMENDATION(S):  Left breast focal asymmetry with questioned architectural distortion. Recommend evaluation with CC and MLO spot compression and lateral views with Tomosynthesis and targeted ultrasound.  No mammographic evidence of malignancy in the right breast.  BI-RADS Category 0: Incomplete    3/29/2023 Left Breast Diagnostic Mammogram with  Ultrasound:   FINDINGS: Digital spot compression CC mammographic and tomosynthesis images of the left breast were obtained. Comparison is made to prior mammography dated 02/22/2023 and 07/14/2020. In the middle third upper outer quadrant of the left breast there is a 0.4 cm irregular mass with associated surrounding mild architectural distortion.  ULTRASOUND: Targeted sonographic evaluation of the upper outer quadrant of the left breast was performed. At the 2 o'clock position on the order of 5 cm from the nipple there is a 1.1 cm cluster of cysts. At the 2:30 position on the order of 5 cm from the nipple there is a 0.87 cm cluster of cysts. At the 2 o'clock position on the order of 8 cm from the nipple there is a 0.4 cm ill-defined hypoechoic mass. This corresponds to the mammographically visualized mass.   IMPRESSION:  There is a 0.4 cm hypoechoic mass with ill-defined margins seen at the 2 o'clock position on the order of 8 cm from the nipple in the left breast. Correlation with an ultrasound-guided left breast  biopsy is recommended.    BI-RADS CATEGORY 4: Suspicious abnormality. Biopsy should be considered.    4/24/2023 Left Breast US Guided Biopsy:   PROCEDURE: The mass at 2:00, 8 cm from the nipple in the left breast was targeted under ultrasound. The skin of the breast was cleansed and prepped. 2 mL of 1% lidocaine and 6 mL of 1% lidocaine with epinephrine were used for local anesthesia. A small skin incision was then made to permit passage of a 10 g needle with a vacuum-assisted biopsy device. 4 core specimens were obtained. A bowtie clip was then deployed at the biopsy site. The patient tolerated the procedure well with no immediate complications.  Post-procedural digital mammographic views of the left breast demonstrate the bowtie clip at the expected location at the site of biopsy in the upper outer middle left breast.  IMPRESSION:  1. Ultrasound-guided core needle biopsy of a 0.4 cm mass at 2:00, 8 cm  from the nipple in the left breast, marked with a bowtie clip. Pathology is malignant and concordant with the imaging assessment. Recommend surgical consultation.    4/24/2023 Pathology:   Final Diagnosis   1. Left Breast, 2:00, 8 cm FN, U/S-Guided Core Needle Biopsy for a Mass:                 A. INVASIVE DUCTAL CARCINOMA, Well differentiated; [place] Histologic Grade I/III       (tubule score = 2, nuclear score = 1, mitoses score = 1), measuring at least 4 mm.               B. Associated atypical ductal hyperplasia and focal atypical lobular hyperplasia.               C. Negative for lymphovascular space invasion.               D. See biomarker template.     5/1/2023 Bilateral Breast MRI   IMPRESSION:  1. Biopsy-proven malignancy in the left breast at the 2-o'clock position represented by the bowtie-shaped metallic clip within a 1.5 cm postbiopsy cavity. No suspicious residual enhancement is seen at this location or in other areas of the left breast and there is no evidence for left axillary adenopathy.  2. There are no findings suspicious for malignancy in the right breast.  BI-RADS category 6: Known biopsy-proven malignancy.    5/25/2023 Left breast Chelsea guided lumpectomy with sentinel lymph node biopsy   Final Diagnosis   1. Left Breast, Chelsea-Guided Lumpectomy (16 grams):               A. TWO FOCI OF INVASIVE DUCTAL CARCINOMA, Well-differentiated; [place] Histologic Grade I/III                    (tubule score = 2, nuclear score = 1, mitoses score = 1):  1. Tumor sizes: 2 mm each (measured on slide 1D).                            2. Margins are negative for invasive carcinoma; Closest distance:  Invasive carcinoma is present                                 5 mm from the posterior margin (superseded by specimen #7).                            3. Negative for lymphovascular space invasion.               B. LOW GRADE DUCTAL CARCINOMA IN SITU (DCIS):                            1. Cribriform and Micropapillary  types.                            2. Extent of DCIS: 12 mm (measured on 1D).                            3. Margins are negative for in situ carcinoma; Closest distance: DCIS is present 0.3 mm from the                                medial margin (superseded by specimen #6) and 0.7 mm from the anterior margin (superseded                                by specimen #4).                C. Atypical lobular hyperplasia and columnar cell lesion with atypia.                D. Two clips with biopsy site changes present and adjacent to invasive carcinoma.               E. See synoptic report (to include parts 2-9) and comments.     2. Left Breast, Additional Superior Margin, Re-excision:                 A. Breast parenchyma with no significant histopathologic changes       (additional 10 mm of tissue free of carcinoma).                 3. Left Breast, Additional Inferior Margin, Re-excision:               A. Breast parenchyma with the usual ductal hyperplasia, fibroadenomatoid change, and micropapilloma                    (additional 10 mm of tissue free of carcinoma).      4. Left Breast, Additional Anterior Margin, Re-excision:               A. Breast parenchyma with no significant histopathologic changes       (additional 10 mm of tissue free of carcinoma).     5. Left Breast, Additional Lateral Margin, Re-excision:               A. Breast parenchyma with no significant histopathologic changes       (additional 12 mm of tissue free of carcinoma).     6. Left Breast, Additional Medial Margin, Re-excision:               A. INVASIVE DUCTAL CARCINOMA, Well-differentiated; [place] Histologic Grade I/III                   (tubule score = 2, nuclear score = 1, mitoses score = 1), measuring 2 mm and present 4 mm from the                    new (final) medial margin; see comment.               B. Focal atypical ductal hyperplasia.      7. Left Breast, Additional Posterior Margin, Re-excision:               A. Mature adipose  tissue with no significant histopathologic changes       (additional 6 mm of tissue free of carcinoma).     8. Left Axillary Craig Lymph node #1, Hot, Count 30, Biopsy:                A. Two lymph nodes, negative for carcinoma (0/2).     9. Left Axillary [place] Lymph node #2, Hot, Count 720, Biopsy:                A. One lymph node, negative for carcinoma (0/1).     Past Medical History:   HLD  HTN    Past Surgical History:    Blepharoplasty   Cardiac cath  Right carpal tunnel release   Hysterectomy and oophorectomy   Right toe surgery   Tonsillectomy   R total knee arthroscopy    Family History:    Maternal grandfather with prostate cancer     Social History:  Denies tobacco use  Occasional alcohol use    Allergies:   No Known Allergies    Medications:     Current Outpatient Medications:     alendronate (Fosamax) 70 MG tablet, Take 1 tablet by mouth Every 7 (Seven) Days. For bone health, Disp: 12 tablet, Rfl: 3    amLODIPine (NORVASC) 10 MG tablet, TAKE 1 TABLET EVERY DAY (Patient taking differently: Take 1 tablet by mouth Daily.), Disp: 90 tablet, Rfl: 1    atorvastatin (LIPITOR) 20 MG tablet, TAKE 1 TABLET EVERY DAY (Patient taking differently: Take 1 tablet by mouth Daily.), Disp: 90 tablet, Rfl: 1    loperamide (IMODIUM) 2 MG capsule, TAKE 4 CAPSULES BY MOUTH DAILY., Disp: 360 capsule, Rfl: 1    magnesium oxide (MAG-OX) 400 tablet tablet, Take 1 tablet by mouth Daily., Disp: , Rfl:     oxybutynin (DITROPAN) 5 MG tablet, Take 1 tablet by mouth 2 (Two) Times a Day. Urge urination (Patient taking differently: Take 1 tablet by mouth Daily. Urge urination), Disp: 180 tablet, Rfl: 2    sertraline (ZOLOFT) 100 MG tablet, TAKE 2 TABLETS EVERY DAY (Patient taking differently: Take 2 tablets by mouth Daily.), Disp: 180 tablet, Rfl: 1    traZODone (DESYREL) 100 MG tablet, TAKE 1 TABLET EVERY NIGHT (Patient taking differently: Take 1 tablet by mouth Every Night.), Disp: 90 tablet, Rfl: 1    anastrozole (ARIMIDEX) 1 MG  tablet, Take 1 tablet by mouth Daily. For breast cancer (Patient not taking: Reported on 2023), Disp: 90 tablet, Rfl: 3    aspirin 81 MG EC tablet, Take 1 tablet twice daily x 14 days; then take 1 daily x 28 days (Patient not taking: Reported on 2023), Disp: 60 tablet, Rfl: 0    hydroCHLOROthiazide (MICROZIDE) 12.5 MG capsule, TAKE 1 CAPSULE EVERY DAY (Patient taking differently: Take 1 capsule by mouth Every Morning.), Disp: 90 capsule, Rfl: 1    Laboratory Values:    Labs from 2023 reviewed    Review of Systems:   Influenza-like illness: no fever, no  cough, no  sore throat, no  body aches, no loss of sense of taste or smell, no known exposure to person with Covid-19.  Constitutional: Negative for fevers or chills  HENT: Negative for hearing loss or runny nose  Eyes: Negative for vision changes or scleral icterus  Respiratory: Negative for cough or shortness of breath  Cardiovascular: Negative for chest pain or heart palpitations  Gastrointestinal: Negative for abdominal pain, nausea, vomiting, constipation, melena, or hematochezia  Genitourinary: Negative for hematuria or dysuria  Musculoskeletal: Negative for joint swelling or gait instability  Neurologic: Negative for tremors or seizures  Psychiatric: Negative for suicidal ideations or depression  All other systems reviewed and negative    Physical Exam:   ECO - Asymptomatic  Constitutional: Well-developed well-nourished, no acute distress  Eyes: Conjunctiva normal, sclera nonicteric  ENMT: Hearing grossly normal, oral mucosa moist  Neck: Supple, no palpable mass, trachea midline  Respiratory: Clear to auscultation, normal inspiratory effort  Cardiovascular: Regular rate, no peripheral edema, no jugular venous distention  Breast: symmetric  Right: No visible abnormalities on inspection while seated, with arms raised or hands on hips. No masses, skin changes, or nipple abnormalities.  Left: No visible abnormalities on inspection while seated,  with arms raised or hands on hips. No masses, skin changes, or nipple abnormalities. Left breast incision and axillary incision clean and dry, no erythema or drainage, no seroma or hematoma   No clinical chest wall involvement.  Gastrointestinal: Soft, nontender  Lymphatics (palpable nodes): No cervical, supraclavicular or axillary lymphadenopathy  Skin:  Warm, dry, no rash on visualized skin surfaces  Musculoskeletal: Symmetric strength, normal gait  Psychiatric: Alert and oriented ×3, normal affect     RANDY HAWKINS M.D.  General and Endoscopic Surgery  [facility]    [address] [place] [address], [address]  [place], [place], [address]  P: [phone]  F: [phone]

## 2023-06-12 ENCOUNTER — HOSPITAL ENCOUNTER (OUTPATIENT)
Dept: PHYSICAL THERAPY | Facility: HOSPITAL | Age: 78
Setting detail: THERAPIES SERIES
Discharge: HOME | End: 2023-06-12
Payer: MEDICARE

## 2023-06-12 ENCOUNTER — TELEPHONE (OUTPATIENT)
Dept: SURGERY | Facility: CLINIC | Age: 78
End: 2023-06-12
Payer: MEDICARE

## 2023-06-12 DIAGNOSIS — C50.412 MALIGNANT NEOPLASM OF UPPER-OUTER QUADRANT OF LEFT BREAST IN FEMALE, ESTROGEN RECEPTOR POSITIVE: Primary | ICD-10-CM

## 2023-06-12 DIAGNOSIS — Z91.89 AT RISK FOR LYMPHEDEMA: ICD-10-CM

## 2023-06-12 DIAGNOSIS — Z17.0 MALIGNANT NEOPLASM OF UPPER-OUTER QUADRANT OF LEFT BREAST IN FEMALE, ESTROGEN RECEPTOR POSITIVE: Primary | ICD-10-CM

## 2023-06-12 DIAGNOSIS — M25.612 DECREASED RANGE OF MOTION OF LEFT SHOULDER: ICD-10-CM

## 2023-06-12 PROCEDURE — 97161 PT EVAL LOW COMPLEX 20 MIN: CPT | Performed by: PHYSICAL THERAPIST

## 2023-06-12 PROCEDURE — 97110 THERAPEUTIC EXERCISES: CPT | Performed by: PHYSICAL THERAPIST

## 2023-06-12 PROCEDURE — 93702 BIS XTRACELL FLUID ANALYSIS: CPT | Performed by: PHYSICAL THERAPIST

## 2023-06-12 RX ORDER — ONDANSETRON 4 MG/1
4 TABLET, FILM COATED ORAL EVERY 8 HOURS PRN
Qty: 10 TABLET | Refills: 0 | Status: SHIPPED | OUTPATIENT
Start: 2023-06-12 | End: 2023-06-15 | Stop reason: SDUPTHER

## 2023-06-12 NOTE — TELEPHONE ENCOUNTER
Patient experiencing nausea and would like a prescription called into the pharmacy to help with this. Could you place orders?

## 2023-06-12 NOTE — THERAPY EVALUATION
Physical Therapy Lymphedema Initial Evaluation  [facility]     Patient Name: Joi Aleman  : 1945  MRN: [id number]  Today's Date: 2023      Visit Date: 2023    Visit Dx:    ICD-10-CM ICD-9-CM   1. Malignant neoplasm of upper-outer quadrant of left breast in female, estrogen receptor positive  C50.412 174.4    Z17.0 V86.0   2. At risk for lymphedema  Z91.89 V49.89   3. Decreased range of motion of left shoulder  M25.612 719.51       Patient Active Problem List   Diagnosis    Adaptive colitis    Arthritis    Avitaminosis D    Depression    Dermatitis seborrheica    Essential hypertension    HLD (hyperlipidemia)    Pulmonary nodule    Right groin pain    Coronary sinus abnormality    Primary osteoarthritis of right knee    Myocardial bridge    Agatston CAC score, <100    Stress incontinence    Skin tag    Urge incontinence    Colon cancer screening    Malignant neoplasm of female breast        Past Medical History:   Diagnosis Date    Agatston CAC score, <100     2020: 29    Anxiety and depression     Arthritis     Brain aneurysm     FOLLOWED AND THEN RELEASED HER YEARS AGO-NO ISSUES CURRENLTY    Breast cancer in female     LEFT    Frequent urination     Hyperlipidemia     Hypertension     Insomnia     Knee pain, bilateral     AT TIMES    Memory change     SINCE STROKE. UNABLE TO REMEBER LIST OF INSTUCTIONS    Myocardial bridge     very mild, mid-LAD    Osteoarthritis     SOB (shortness of breath) on exertion     Stroke Years ago    About 8 to 10 years ago        Past Surgical History:   Procedure Laterality Date    BLEPHAROPLASTY      BREAST BIOPSY Left 2023    BREAST LUMPECTOMY WITH SENTINEL NODE BIOPSY Left 2023    Procedure: left breast [place] guided lumpectomy with sentinel lymph node biopsy;  Surgeon: Alissa Allen MD;  Location: [facility] OR [facility];  Service: General;  Laterality: Left;    CARDIAC CATHETERIZATION N/A 2020    Procedure: Coronary angiography;  Surgeon:  Víctor Hernandez MD;  Location:  [facility] INVASIVE [facility]ATION;  Service: Cardiovascular;  Laterality: N/A;    CARDIAC CATHETERIZATION N/A 05/12/2020    Procedure: Left heart cath;  Surgeon: Víctor Hernandez MD;  Location:  [facility]ATH INVASIVE LOCATION;  Service: Cardiovascular;  Laterality: N/A;    CARDIAC CATHETERIZATION N/A 05/12/2020    Procedure: Left ventriculography;  Surgeon: Víctor Hernandez MD;  Location[facility] C[facility];  Service: Cardiovascular;  Laterality: N/A;    CARDIAC CATHETERIZATION N/A 05/12/2020    Procedure: Right heart cath with shunt run;  Surgeon: Víctor Hernandez MD;  Location:  [facility] CATH INVASIVE LOCATION;  Service: Cardiovascular;  Laterality: N/A;    CARPAL TUNNEL RELEASE Right     COLON SURGERY      COLON RESECTION FOR ENDOMETROSIS    COLONOSCOPY      normal less than 10 years ago    EYE SURGERY      Cataracts    HYSTERECTOMY      OOPHORECTOMY      TOE SURGERY Right     right big toe replecemnet 12 years ago    TONSILLECTOMY      TOTAL KNEE ARTHROPLASTY Right 06/02/2021    Procedure: TOTAL KNEE ARTHROPLASTY, with left knee steroid injection;  Surgeon: Shamar Boone MD;  Location: [facility] MAIN OR;  Service: Orthopedics;  Laterality: Right;       Visit Dx:    ICD-10-CM ICD-9-CM   1. Malignant neoplasm of upper-outer quadrant of left breast in female, estrogen receptor positive  C50.412 174.4    Z17.0 V86.0   2. At risk for lymphedema  Z91.89 V49.89   3. Decreased range of motion of left shoulder  M25.612 719.51        Patient History       Row Name 06/12/23 0830             History    Chief Complaint Pain;Joint stiffness  -JS      Type of Pain Shoulder pain  -JS      Date Current Problem(s) Began 05/25/23  -JS      Brief Description of Current Complaint Pt diagnosed with L breast CA. S/p L lumpectomy, SLNB (0/3) on 5/25/23. No radiation or chemo. Presents with c/o decreased stamina, increased L shoulder pain that causes nausea, decreased L shoulder ROM.  Patient  reports L upper arm, chest being sensistive to touch. Reports has started some shoulder exercises, walking <10 min around house starting just this week. Plans to drive to the beach with her family next week.  Lives alone. 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  -JS      Surgery/Hospitalization 5/25/23 L lumpectomy, SLNB  -JS         Pain     Pain Location Shoulder  -JS      Pain at Present 3  -JS      Pain at Best 3  -JS      Pain at Worst 9  -JS      What Performance Factors Make the Current Problem(s) WORSE? Any movement of L arm, reaching  -JS      Is your sleep disturbed? No  -JS      Difficulties at work? Has not returned to work  -JS      Difficulties with ADL's? Difficulty reaching, lifting. No driving  -JS      Difficulties with recreational activities? Limited exercise, walking  -JS         Fall Risk Assessment    Any falls in the past year: Yes  -JS      Number of falls reported in the last 12 months 1 fall Sept 2022, lost balance when sitting after a walk  -JS         Daily Activities    Primary Language English  -JS      Are you able to read Yes  -JS      Are you able to write Yes  -JS      How does patient learn best? Demonstration;Pictures/Video  -JS      Pt Participated in POC and Goals Yes  -JS         Safety    Are you being hurt, hit, or frightened by anyone at home or in your life? No  -JS      Are you being neglected by a caregiver No  -JS                User Key  (r) = Recorded By, (t) = Taken By, (c) = Cosigned By      Initials Name Provider Type    Seema Grimm, PT Physical Therapist                     Lymphedema       Row Name 06/12/23 9937             Subjective Pain    Able to rate subjective pain? yes  -JS      Pre-Treatment Pain Level 3  -JS         Lymphedema Assessment     Lymphedema Surgery Comments L lumpectomy, SLNB on 5/25/23  -JS      Lymph Nodes Removed # 3  -JS      Positive Lymph Nodes # 0  -JS      Chemo Received no  -JS      Radiation Therapy Received no  -JS      Infections or Cellulitis? no  -JS         Posture/Observations    Alignment Options Forward head;Thoracic kyphosis;Rounded shoulders  -JS      Forward Head Moderate  -JS      Thoracic Kyphosis Increased  -JS      Rounded Shoulders Bilateral:;Moderate  -JS         General ROM    RT Upper Ext Rt Shoulder ABduction;Rt Shoulder Flexion;Rt Shoulder External Rotation;Rt Shoulder Internal Rotation  -JS      LT Upper Ext Lt Shoulder ABduction;Lt Shoulder Flexion;Lt Shoulder External Rotation;Lt Shoulder Internal Rotation  -JS         Right Upper Ext    Rt Shoulder Abduction AROM 145  -JS      Rt Shoulder Flexion AROM 150  -JS      Rt Shoulder External Rotation AROM ARASELI T1  -JS      Rt Shoulder Internal Rotation AROM FIR L4-5  -JS         Left Upper Ext    Lt Shoulder Abduction AROM 95  -JS      Lt Shoulder Flexion AROM 95  -JS      Lt Shoulder External Rotation AROM ARASELI T1, compensates with decreased shoulder abd  -JS      Lt Shoulder Internal Rotation AROM FIR L4-5  -JS      Lt Upper Extremity Comments  Pain, tightness all movements  -JS         MMT (Manual Muscle Testing)    General MMT Comments B UE strength 5/5 except R ER 4+/5, L 4/5  -JS         Lymphedema Edema Assessment    Edema Assessment Comment No significant swelling noted.  -JS         Skin Changes/Observations    Skin Observations Comment Increased sensistivity to touch L breast, L proximal UE  -JS         Lymphedema Measurements    Measurement Type(s) Quick Girth  -JS      Quick Girth Areas Upper extremities  -JS         LUE Quick Girth (cm)    Axilla 39 cm  -JS      Mid upper arm 37 cm  -JS      Elbow 28 cm  -JS      Mid forearm 21 cm  -JS      Wrist crease 15 cm  -JS      Other 1 40 cm  length  -JS      LUE Quick Girth Total 180  -JS         JORGE Gar  Girth (cm)    Axilla 39 cm  -JS      Mid upper arm 36.5 cm  -JS      Elbow 28 cm  -JS      Mid forearm 20.5 cm  -JS      Wrist crease 15 cm  -JS      Other 1 40 cm  length  -JS      RUE Quick Girth Total 179  -JS         L-Dex Bioimpedence Screening    L-Dex Measurement Extremity LUE  -JS      L-Dex Patient Position Standing  -JS      L-Dex UE Dominate Side Right  -JS      L-Dex UE At Risk Side Left  -JS      L-Dex UE Pre Surgical Value No  -JS      L-Dex UE Score 2.3  -JS      L-Dex UE Baseline Score 2.3  -JS      L-Dex UE Value Change 0  -JS      L-Dex UE Comment WNL  -JS      $ L-Dex Charge yes  -JS                User Key  (r) = Recorded By, (t) = Taken By, (c) = Cosigned By      Initials Name Provider Type    Seema Grimm, PT Physical Therapist                                    Therapy Education  Education Details: Education provided on signs/symptoms of lymphedema and prevention techniques with Healthy Habits handout issued.  Instruction in initial HEP with written instruction issued via [employer] [id number].  Encouraged pt to begin gradual walking program for exercise. Education provided on desensitization techniques with light massage to L breast & upper arm. Bioimpedance score & significance discussed.       OP Exercises       Row Name 06/12/23 0830             Subjective Comments    Subjective Comments C/o decreased stamina, increased L UE pain/sensistivity following surgery. No planned air travel, but is driving to the beach with family next week.  -JS         Subjective Pain    Able to rate subjective pain? yes  -JS      Pre-Treatment Pain Level 3  -JS         Total Minutes    83061 - PT Therapeutic Exercise Minutes 10  -JS         Exercise 1    Exercise Name 1 Post shoulder rolls  -JS      Reps 1 10  -JS         Exercise 2    Exercise Name 2 Scapular retraction  -JS      Reps 2 10  -JS         Exercise 3    Exercise Name 3 B shoulder ER (sitting)  -JS      Reps 3 10  -JS         Exercise 4    Exercise  Name 4 B shoulder flex AAROM (hands clasped- supine)  -JS      Reps 4 10  -JS      Time 4 5 sec  -JS         Exercise 5    Exercise Name 5 B shoulder ER (supine butterfly)  -JS      Reps 5 2  -JS      Time 5 5 sec  -JS                User Key  (r) = Recorded By, (t) = Taken By, (c) = Cosigned By      Initials Name Provider Type    Seema Grimm, PT Physical Therapist                                 PT OP Goals       Row Name 06/12/23 0830          PT Short Term Goals    STG Date to Achieve 07/12/23  -JS     STG 1 Patient will be independent and compliant with initial HEP.  -JS     STG 1 Progress New  -JS     STG 2 Patient will be independent in signs/symptoms of lymphedema.  -JS     STG 2 Progress New  -JS     STG 3 Patient will demonstrate improvement in L shoulder flexion, abd AROM to 120 degrees or better.  -JS     STG 3 Progress New  -JS        Long Term Goals    LTG Date to Achieve 09/10/23  -JS     LTG 1 Patient will be independent in comprehensive HEP to continue self management of symptoms independently.  -JS     LTG 1 Progress New  -JS     LTG 2 Patient will demonstrate L UE AROM WFL, symmetrical for improvement in tolerance to reaching, functional use of UE.  -JS     LTG 2 Progress New  -JS     LTG 3 Patient will maintain L-Dex bioimpedance score WNL.  -JS     LTG 3 Progress New  -JS     LTG 4 Patient will report 50% or greater improvement in sensistivity of L breast/UE.  -     LTG 4 Progress New  -     LTG 5 Patient will report participation in walking program at least 3-4x/week for 15-20 min for improved endurance, exercise.  -     LTG 5 Progress New  -        Time Calculation    PT Goal Re-Cert Due Date 09/10/23  -               User Key  (r) = Recorded By, (t) = Taken By, (c) = Cosigned By      Initials Name Provider Type    Seema Grimm, PT Physical Therapist                     PT Assessment/Plan       Row Name 06/12/23 0830          PT Assessment    Functional Limitations Limitations in  functional capacity and performance;Performance in leisure activities;Performance in self-care ADL;Limitation in home management;Performance in work activities;Limitations in community activities  -JS     Impairments Impaired flexibility;Impaired lymphatic circulation;Pain;Posture;Range of motion;Sensation  -JS     Assessment Comments Joi Aleman is a 77 y.o. female recently diagnosed with Left Breast Cancer, presents to therapy in evolving condition, s/p Lumpectomy with [place] Node Biopsy (0/3) performed on 5/25/23 by surgeons Dr. Allen. Joi Aleman is at increased risk of post Lumpectomy lymphedema syndrome Left Upper Extremity due to Breast surgery Lymph Node Removal.  She presents with post-operative decreased range of motion, flexibility, strength, function and increased pain.  Currently, negative s/s of lymphedema, infection, seroma, or axillary cording.  We did complete a baseline post operative bioimpedance assessment, with current L-Dex score of 2.3, which is WNL. Joi Aleman will benefit from skilled formal Breast Care PHysical Therapy at this time to address listed dysfunctions. Please refer to Plan.  -JS     Please refer to paper survey for additional self-reported information Yes  -JS     Rehab Potential Good  -JS     Patient/caregiver participated in establishment of treatment plan and goals Yes  -JS     Patient would benefit from skilled therapy intervention Yes  -JS        PT Plan    PT Frequency Other (comment)  -JS     Predicted Duration of Therapy Intervention (PT) Return in 1 month  -JS     Planned CPT's? PT EVAL LOW COMPLEXITY: 21464;PT RE-EVAL: 95069;PT THER PROC EA 15 MIN: 17845;PT THER ACT EA 15 MIN: 72651;PT MANUAL THERAPY EA 15 MIN: 64022;PT NEUROMUSC RE-EDUCATION EA 15 MIN: 29590;PT BIS XTRACELL FLUID ANALYSIS: 83586;PT SELF CARE/MGMT/TRAIN 15 MIN: 62327  -JS     Physical Therapy Interventions (Optional Details) bioimpedance assessment;home exercise program;lumbar  stabilization;manual lymphatic drainage;manual therapy techniques;neuromuscular re-education;patient/family education;ROM (Range of Motion);strengthening;stretching  -JAYDEN     PT Plan Comments Return in 1 month to reassess ROM, review desensitization techniques & HEP.  -JAYDEN               User Key  (r) = Recorded By, (t) = Taken By, (c) = Cosigned By      Initials Name Provider Type    Seema Grimm, PT Physical Therapist                       Outcome Measure Options: Quick DASH  Quick DASH  Open a tight or new jar.: Moderate Difficulty  Do heavy household chores (e.g., wash walls, wash floors): Unable  Carry a shopping bag or briefcase: No Difficulty  Wash your back: Mild Difficulty  Use a knife to cut food: Mild Difficulty  Recreational activities in which you take some force or impact through your arm, should or hand (e.g. golf, hammering, tennis, etc.): Moderate Difficulty  During the past week, to what extent has your arm, shoulder, or hand problem interfered with your normal social activites with family, friends, neighbors or groups?: Extremely  During the past week, were you limited in your work or other regular daily activities as a result of your arm, shoulder or hand problem?: Very limited  Arm, Shoulder, or hand pain: Moderate  Tingling (pins and needles) in your arm, shoulder, or hand: None  During the past week, how much difficulty have you had sleeping because of the pain in your arm, shoulder or hand?: No difficulty  Number of Questions Answered: 11  Quick DASH Score: 43.18         Time Calculation:   Start Time: 0830  Stop Time: 0920  Time Calculation (min): 50 min  Timed Charges  55948 - PT Therapeutic Exercise Minutes: 10  Total Minutes  Timed Charges Total Minutes: 10   Total Minutes: 10   Therapy Charges for Today       Code Description Service Date Service Provider Modifiers Qty    62743399025 HC PT BIS XTRACELL FLUID ANALYSIS 6/12/2023 Seema Templeton, PT  1    37194281283 HC PT EVAL LOW COMPLEXITY 2  6/12/2023 Seema Templeton, PT GP 1    42863624350 HC PT THER PROC EA 15 MIN 6/12/2023 Seema Templeton, PT GP 1            PT G-Codes  Outcome Measure Options: Quick DASH  Quick DASH Score: 43.18         Seema Templeton, PT  6/12/2023

## 2023-06-14 ENCOUNTER — TELEPHONE (OUTPATIENT)
Dept: SURGERY | Facility: CLINIC | Age: 78
End: 2023-06-14
Payer: MEDICARE

## 2023-06-14 DIAGNOSIS — F51.01 PRIMARY INSOMNIA: ICD-10-CM

## 2023-06-14 RX ORDER — TRAZODONE HYDROCHLORIDE 100 MG/1
100 TABLET ORAL NIGHTLY
Qty: 90 TABLET | Refills: 1 | Status: SHIPPED | OUTPATIENT
Start: 2023-06-14

## 2023-06-14 NOTE — TELEPHONE ENCOUNTER
Dr. Duarte sent in rx for Zofran on 6/12. Pt states she was given 10 tabs but she is going on vacation next week and would like another prescription.

## 2023-06-15 RX ORDER — ONDANSETRON 4 MG/1
4 TABLET, FILM COATED ORAL EVERY 8 HOURS PRN
Qty: 10 TABLET | Refills: 0 | Status: SHIPPED | OUTPATIENT
Start: 2023-06-15 | End: 2024-06-14

## 2023-06-27 ENCOUNTER — CLINICAL SUPPORT (OUTPATIENT)
Dept: CARDIOLOGY | Facility: CLINIC | Age: 78
End: 2023-06-27
Payer: MEDICARE

## 2023-06-27 VITALS — SYSTOLIC BLOOD PRESSURE: 123 MMHG | HEART RATE: 59 BPM | DIASTOLIC BLOOD PRESSURE: 62 MMHG

## 2023-06-27 NOTE — PROGRESS NOTES
Procedure   Procedures     Patient came for a BP check reviewed medication list and checked BP and heart rate   Home Machine:  BP: 123/68  P: 61    Office Cuff:  BP: 123/62  P:59

## 2023-07-24 ENCOUNTER — PATIENT OUTREACH (OUTPATIENT)
Dept: OTHER | Facility: HOSPITAL | Age: 78
End: 2023-07-24
Payer: MEDICARE

## 2023-07-24 NOTE — PROGRESS NOTES
Called Ms. Aleman to see how she was doing. Left a message with my contact information and asked her to call back at her convenience. Will mail survivorship info as well

## 2023-08-24 DIAGNOSIS — F51.01 PRIMARY INSOMNIA: ICD-10-CM

## 2023-08-24 RX ORDER — TRAZODONE HYDROCHLORIDE 100 MG/1
100 TABLET ORAL NIGHTLY
Qty: 90 TABLET | Refills: 1 | Status: SHIPPED | OUTPATIENT
Start: 2023-08-24

## 2023-10-10 DIAGNOSIS — F33.1 MODERATE EPISODE OF RECURRENT MAJOR DEPRESSIVE DISORDER: ICD-10-CM

## 2023-10-10 DIAGNOSIS — E78.2 MIXED HYPERLIPIDEMIA: ICD-10-CM

## 2023-10-10 DIAGNOSIS — I10 ESSENTIAL HYPERTENSION: ICD-10-CM

## 2023-10-10 RX ORDER — AMLODIPINE BESYLATE 10 MG/1
TABLET ORAL
Qty: 90 TABLET | Refills: 0 | Status: SHIPPED | OUTPATIENT
Start: 2023-10-10

## 2023-10-10 RX ORDER — SERTRALINE HYDROCHLORIDE 100 MG/1
200 TABLET, FILM COATED ORAL DAILY
Qty: 90 TABLET | Refills: 1 | Status: SHIPPED | OUTPATIENT
Start: 2023-10-10

## 2023-10-10 RX ORDER — ATORVASTATIN CALCIUM 20 MG/1
TABLET, FILM COATED ORAL
Qty: 90 TABLET | Refills: 0 | Status: SHIPPED | OUTPATIENT
Start: 2023-10-10

## 2023-11-10 ENCOUNTER — TELEPHONE (OUTPATIENT)
Dept: ONCOLOGY | Facility: CLINIC | Age: 78
End: 2023-11-10
Payer: MEDICARE

## 2023-11-10 NOTE — TELEPHONE ENCOUNTER
----- Message from Zenaida Choi sent at 11/10/2023 12:36 PM EST -----  Regarding: FW: Appointment Cancellation Request  Contact: [phone]    ----- Message -----  From: Joi Aleman  Sent: 11/10/2023  12:04 PM EST  To: [facility]  Subject: Appointment Cancellation Request                 Joi Aleman would like to cancel the following appointments:    Vivian Stauffer in [facility] ([phone]), 11/17/2023 11:20 AM    Comments:  She is no longer my doctor.

## 2023-11-10 NOTE — TELEPHONE ENCOUNTER
Spoke with patient regarding [employer] insurance and upcoming appointment with Dr. Stauffer. Patient wants to cancel all future appointments. I offered patient options to try to keep her upcoming appointments and patient declined. Offered for our office to refer her to a new Hem/Onc practice and patient declined. Pt stated her new PCP is going to refer her to a new practice.

## 2024-01-14 DIAGNOSIS — K58.0 IRRITABLE BOWEL SYNDROME WITH DIARRHEA: ICD-10-CM

## 2024-01-15 NOTE — TELEPHONE ENCOUNTER
Rx Refill Note  Requested Prescriptions     Pending Prescriptions Disp Refills    loperamide (IMODIUM) 2 MG capsule [Pharmacy Med Name: LOPERAMIDE HCL 2 MG Capsule] 360 capsule 3     Sig: TAKE 4 CAPSULES BY MOUTH DAILY.      Last office visit with prescribing clinician: 7/13/2023   Last telemedicine visit with prescribing clinician: Visit date not found   Next office visit with prescribing clinician: 2/6/2024                         Would you like a call back once the refill request has been completed: [] Yes [] No    If the office needs to give you a call back, can they leave a voicemail: [] Yes [] No    Leah Jacobo MA  01/15/24, 07:42 EST

## 2024-01-16 RX ORDER — LOPERAMIDE HYDROCHLORIDE 2 MG/1
8 CAPSULE ORAL DAILY
Qty: 360 CAPSULE | Refills: 3 | Status: SHIPPED | OUTPATIENT
Start: 2024-01-16

## 2024-01-17 RX ORDER — HYDROCHLOROTHIAZIDE 12.5 MG/1
CAPSULE, GELATIN COATED ORAL
Qty: 90 CAPSULE | Refills: 3 | Status: SHIPPED | OUTPATIENT
Start: 2024-01-17

## 2024-01-17 NOTE — TELEPHONE ENCOUNTER
Rx did not pass protocol and will have to send to the ordering provider for review and fill.   Please see pending rx.    Requested Prescriptions     Pending Prescriptions Disp Refills    hydroCHLOROthiazide (MICROZIDE) 12.5 MG capsule [Pharmacy Med Name: HYDROCHLOROTHIAZIDE 12.5 MG Capsule] 90 capsule 3     Sig: TAKE 1 CAPSULE EVERY DAY

## 2024-01-30 DIAGNOSIS — F33.1 MODERATE EPISODE OF RECURRENT MAJOR DEPRESSIVE DISORDER: ICD-10-CM

## 2024-01-30 RX ORDER — SERTRALINE HYDROCHLORIDE 100 MG/1
200 TABLET, FILM COATED ORAL DAILY
Qty: 180 TABLET | Refills: 1 | Status: SHIPPED | OUTPATIENT
Start: 2024-01-30

## 2024-01-30 NOTE — TELEPHONE ENCOUNTER
Rx Refill Note  Requested Prescriptions     Pending Prescriptions Disp Refills    sertraline (ZOLOFT) 100 MG tablet [Pharmacy Med Name: SERTRALINE HYDROCHLORIDE 100 MG Tablet] 180 tablet 3     Sig: TAKE 2 TABLETS EVERY DAY      Last office visit with prescribing clinician: 7/13/2023   Last telemedicine visit with prescribing clinician: Visit date not found   Next office visit with prescribing clinician: Visit date not found                         Would you like a call back once the refill request has been completed: [] Yes [] No    If the office needs to give you a call back, can they leave a voicemail: [] Yes [] No    Johnson Love CMA/LMR  01/30/24, 12:48 EST

## 2024-02-28 RX ORDER — ALENDRONATE SODIUM 70 MG/1
70 TABLET ORAL
Qty: 12 TABLET | Refills: 3 | OUTPATIENT
Start: 2024-02-28

## 2024-04-01 RX ORDER — ANASTROZOLE 1 MG/1
1 TABLET ORAL DAILY
Refills: 3 | OUTPATIENT
Start: 2024-04-01

## 2024-04-15 ENCOUNTER — TRANSCRIBE ORDERS (OUTPATIENT)
Dept: ADMINISTRATIVE | Facility: HOSPITAL | Age: 79
End: 2024-04-15
Payer: MEDICARE

## 2024-04-15 DIAGNOSIS — Z12.31 VISIT FOR SCREENING MAMMOGRAM: Primary | ICD-10-CM

## 2024-05-13 ENCOUNTER — TELEPHONE (OUTPATIENT)
Dept: CARDIOLOGY | Facility: CLINIC | Age: 79
End: 2024-05-13

## 2024-05-13 NOTE — TELEPHONE ENCOUNTER
Caller: Joi Aleman    Relationship to patient: Self    Best call back number [phone]    Chief complaint: RUNS OUT OF BREATH EASY, THE SAME AS THE LAST TIME SHE WAS SEEN    Type of visit: FOLLOW UP    Requested date: ASAP      If rescheduling, when is the original appointment: 8/23

## 2024-05-14 RX ORDER — ANASTROZOLE 1 MG/1
1 TABLET ORAL DAILY
Qty: 90 TABLET | Refills: 3 | OUTPATIENT
Start: 2024-05-14

## 2024-05-14 RX ORDER — ANASTROZOLE 1 MG/1
1 TABLET ORAL DAILY
Qty: 90 TABLET | Refills: 0 | Status: SHIPPED | OUTPATIENT
Start: 2024-05-14

## 2024-05-14 NOTE — TELEPHONE ENCOUNTER
Caller: Joi Aleman    Relationship: Self    Best call back number: [phone]    Requested Prescriptions:   Requested Prescriptions     Pending Prescriptions Disp Refills    anastrozole (ARIMIDEX) 1 MG tablet 90 tablet 3     Sig: Take 1 tablet by mouth Daily. For breast cancer        Pharmacy where request should be sent: [facility] - [facility] [address] - [phone]  - [phone] FX     Last office visit with prescribing clinician: Visit date not found   Last telemedicine visit with prescribing clinician: Visit date not found   Next office visit with prescribing clinician: Visit date not found     Additional details provided by patient: SHE IS COMPLETELY OUT.    Does the patient have less than a 3 day supply:  [x] Yes  [] No    Would you like a call back once the refill request has been completed: [] Yes [x] No    If the office needs to give you a call back, can they leave a voicemail: [] Yes [x] No    Benedicto Issa Rep   05/14/24 11:24 EDT

## 2024-05-14 NOTE — TELEPHONE ENCOUNTER
Spoke to patient. She is overdue for FU. I have scheduled her to be seen as early as her schedule would allow her to.     Emily Montalvo RN  Triage MG

## 2024-05-14 NOTE — TELEPHONE ENCOUNTER
Called the patient to explain she would have to have a f/u scheduled for us to be able to refill her arimidex. Patient then stated she feels silly paying for a specialist and I explained this is policy. She asked to be scheduled. I sent scheduling a message to add her on. Patient v/u.

## 2024-05-20 ENCOUNTER — HOSPITAL ENCOUNTER (OUTPATIENT)
Dept: MAMMOGRAPHY | Facility: HOSPITAL | Age: 79
Discharge: HOME | End: 2024-05-20
Admitting: INTERNAL MEDICINE
Payer: MEDICARE

## 2024-05-20 DIAGNOSIS — Z12.31 VISIT FOR SCREENING MAMMOGRAM: ICD-10-CM

## 2024-05-20 PROCEDURE — 77063 BREAST TOMOSYNTHESIS BI: CPT

## 2024-05-20 PROCEDURE — 77067 SCR MAMMO BI INCL CAD: CPT

## 2024-05-29 DIAGNOSIS — F51.01 PRIMARY INSOMNIA: ICD-10-CM

## 2024-05-29 RX ORDER — TRAZODONE HYDROCHLORIDE 100 MG/1
100 TABLET ORAL NIGHTLY
Qty: 90 TABLET | Refills: 3 | Status: SHIPPED | OUTPATIENT
Start: 2024-05-29

## 2024-06-03 ENCOUNTER — OFFICE VISIT (OUTPATIENT)
Dept: ONCOLOGY | Facility: CLINIC | Age: 79
End: 2024-06-03
Payer: MEDICARE

## 2024-06-03 ENCOUNTER — LAB (OUTPATIENT)
Dept: LAB | Facility: HOSPITAL | Age: 79
End: 2024-06-03
Payer: MEDICARE

## 2024-06-03 VITALS
DIASTOLIC BLOOD PRESSURE: 67 MMHG | HEIGHT: 62 IN | RESPIRATION RATE: 15 BRPM | HEART RATE: 59 BPM | OXYGEN SATURATION: 97 % | WEIGHT: 160 LBS | TEMPERATURE: 97.9 F | BODY MASS INDEX: 29.44 KG/M2 | SYSTOLIC BLOOD PRESSURE: 109 MMHG

## 2024-06-03 DIAGNOSIS — C50.412 MALIGNANT NEOPLASM OF UPPER-OUTER QUADRANT OF LEFT BREAST IN FEMALE, ESTROGEN RECEPTOR POSITIVE: ICD-10-CM

## 2024-06-03 DIAGNOSIS — C50.412 MALIGNANT NEOPLASM OF UPPER-OUTER QUADRANT OF LEFT BREAST IN FEMALE, ESTROGEN RECEPTOR POSITIVE: Primary | ICD-10-CM

## 2024-06-03 DIAGNOSIS — Z12.31 SCREENING MAMMOGRAM, ENCOUNTER FOR: ICD-10-CM

## 2024-06-03 DIAGNOSIS — Z17.0 MALIGNANT NEOPLASM OF UPPER-OUTER QUADRANT OF LEFT BREAST IN FEMALE, ESTROGEN RECEPTOR POSITIVE: ICD-10-CM

## 2024-06-03 DIAGNOSIS — Z17.0 MALIGNANT NEOPLASM OF UPPER-OUTER QUADRANT OF LEFT BREAST IN FEMALE, ESTROGEN RECEPTOR POSITIVE: Primary | ICD-10-CM

## 2024-06-03 LAB
ALBUMIN SERPL-MCNC: 3.9 G/DL (ref 3.5–5.2)
ALBUMIN/GLOB SERPL: 1.8 G/DL
ALP SERPL-CCNC: 79 U/L (ref 39–117)
ALT SERPL W P-5'-P-CCNC: 11 U/L (ref 1–33)
ANION GAP SERPL CALCULATED.3IONS-SCNC: 8.2 MMOL/L (ref 5–15)
AST SERPL-CCNC: 23 U/L (ref 1–32)
BASOPHILS # BLD AUTO: 0.03 10*3/MM3 (ref 0–0.2)
BASOPHILS NFR BLD AUTO: 0.5 % (ref 0–1.5)
BILIRUB SERPL-MCNC: 0.3 MG/DL (ref 0–1.2)
BUN SERPL-MCNC: 15 MG/DL (ref 8–23)
BUN/CREAT SERPL: 14.7 (ref 7–25)
CALCIUM SPEC-SCNC: 9.3 MG/DL (ref 8.6–10.5)
CHLORIDE SERPL-SCNC: 96 MMOL/L (ref 98–107)
CO2 SERPL-SCNC: 29.8 MMOL/L (ref 22–29)
CREAT SERPL-MCNC: 1.02 MG/DL (ref 0.57–1)
DEPRECATED RDW RBC AUTO: 40.6 FL (ref 37–54)
EGFRCR SERPLBLD CKD-EPI 2021: 57.1 ML/MIN/1.73
EOSINOPHIL # BLD AUTO: 0.17 10*3/MM3 (ref 0–0.4)
EOSINOPHIL NFR BLD AUTO: 2.6 % (ref 0.3–6.2)
ERYTHROCYTE [DISTWIDTH] IN BLOOD BY AUTOMATED COUNT: 12.4 % (ref 12.3–15.4)
GLOBULIN UR ELPH-MCNC: 2.2 GM/DL
GLUCOSE SERPL-MCNC: 112 MG/DL (ref 65–99)
HCT VFR BLD AUTO: 37.4 % (ref 34–46.6)
HGB BLD-MCNC: 12.5 G/DL (ref 12–15.9)
IMM GRANULOCYTES # BLD AUTO: 0.03 10*3/MM3 (ref 0–0.05)
IMM GRANULOCYTES NFR BLD AUTO: 0.5 % (ref 0–0.5)
LYMPHOCYTES # BLD AUTO: 1.09 10*3/MM3 (ref 0.7–3.1)
LYMPHOCYTES NFR BLD AUTO: 16.6 % (ref 19.6–45.3)
MCH RBC QN AUTO: 30.1 PG (ref 26.6–33)
MCHC RBC AUTO-ENTMCNC: 33.4 G/DL (ref 31.5–35.7)
MCV RBC AUTO: 90.1 FL (ref 79–97)
MONOCYTES # BLD AUTO: 0.8 10*3/MM3 (ref 0.1–0.9)
MONOCYTES NFR BLD AUTO: 12.2 % (ref 5–12)
NEUTROPHILS NFR BLD AUTO: 4.46 10*3/MM3 (ref 1.7–7)
NEUTROPHILS NFR BLD AUTO: 67.6 % (ref 42.7–76)
NRBC BLD AUTO-RTO: 0 /100 WBC (ref 0–0.2)
PLATELET # BLD AUTO: 208 10*3/MM3 (ref 140–450)
PMV BLD AUTO: 8.7 FL (ref 6–12)
POTASSIUM SERPL-SCNC: 4 MMOL/L (ref 3.5–5.2)
PROT SERPL-MCNC: 6.1 G/DL (ref 6–8.5)
RBC # BLD AUTO: 4.15 10*6/MM3 (ref 3.77–5.28)
SODIUM SERPL-SCNC: 134 MMOL/L (ref 136–145)
WBC NRBC COR # BLD AUTO: 6.58 10*3/MM3 (ref 3.4–10.8)

## 2024-06-03 PROCEDURE — 85025 COMPLETE CBC W/AUTO DIFF WBC: CPT

## 2024-06-03 PROCEDURE — 3078F DIAST BP <80 MM HG: CPT | Performed by: INTERNAL MEDICINE

## 2024-06-03 PROCEDURE — 36415 COLL VENOUS BLD VENIPUNCTURE: CPT

## 2024-06-03 PROCEDURE — 99214 OFFICE O/P EST MOD 30 MIN: CPT | Performed by: INTERNAL MEDICINE

## 2024-06-03 PROCEDURE — 80053 COMPREHEN METABOLIC PANEL: CPT

## 2024-06-03 PROCEDURE — 1126F AMNT PAIN NOTED NONE PRSNT: CPT | Performed by: INTERNAL MEDICINE

## 2024-06-03 PROCEDURE — G2211 COMPLEX E/M VISIT ADD ON: HCPCS | Performed by: INTERNAL MEDICINE

## 2024-06-03 PROCEDURE — 3074F SYST BP LT 130 MM HG: CPT | Performed by: INTERNAL MEDICINE

## 2024-06-03 RX ORDER — ANASTROZOLE 1 MG/1
1 TABLET ORAL DAILY
Qty: 90 TABLET | Refills: 3 | Status: SHIPPED | OUTPATIENT
Start: 2024-06-03

## 2024-06-03 NOTE — PROGRESS NOTES
Subjective   Joi Aleman is a 78 y.o. female.  Referred by Dr. Allen for left breast invasive ductal carcinoma.    History of Present Illness   Ms. Aleman is a 77-year-old lady with hypertension, hyperlipidemia presented with a screen detected abnormality of the left breast.    2/22/2023-bilateral screening mammogram  Left breast focal asymmetry with questioned architectural distortion.  No mammographic evidence of malignancy in the right breast.    3/29/2023-left breast diagnostic mammogram and ultrasound  There is a 0.4 cm hypoechoic mass with ill-defined margins at 2:00, 8 cm from the nipple in the left breast.  Ultrasound-guided biopsy recommended.    4/24/2023-ultrasound-guided biopsy-pathology consistent with invasive ductal carcinoma, grade 1, associated atypical ductal hyperplasia and focal atypical lobular hyperplasia, negative for lymphovascular space invasion.  ER +% strong, WV +% strong  HER2 negative  Ki-67 10%    5/27/2023-left breast lumpectomy  Pathology consistent with multifocal invasive ductal carcinoma  3 foci each measuring 2 mm  3 sentinel lymph nodes negative  Grade 1  ER/WV strongly positive HER2 negative  Margins negative    Patient started anastrozole in May 2023.      Interval history  She presents to the clinic today for follow-up.  She had some issues with insurance and hence unable to come to the clinic prior to that.  She is tolerating anastrozole well without any significant side effects.  Denies any new breast masses.  Had a recent mammogram in May 2024 which was benign.    The following portions of the patient's history were reviewed and updated as appropriate: allergies, current medications, past family history, past medical history, past social history, past surgical history and problem list.    Past Medical History:   Diagnosis Date    Agatston CAC score, <100     2020: 29    Anxiety and depression     Arthritis     Brain aneurysm     FOLLOWED AND THEN RELEASED  HER YEARS AGO-NO ISSUES CURRENLTY    Breast cancer in female     LEFT    Frequent urination     Hyperlipidemia     Hypertension     Insomnia     Knee pain, bilateral     AT TIMES    Memory change     SINCE STROKE. UNABLE TO REMEBER LIST OF INSTUCTIONS    Myocardial bridge     very mild, mid-LAD    Osteoarthritis     SOB (shortness of breath) on exertion     Stroke Years ago    About 8 to 10 years ago        Past Surgical History:   Procedure Laterality Date    BLEPHAROPLASTY      BREAST BIOPSY Left 04/24/2023    BREAST LUMPECTOMY WITH SENTINEL NODE BIOPSY Left 5/25/2023    Procedure: left breast Chelsea guided lumpectomy with sentinel lymph node biopsy;  Surgeon: Alissa Allen MD;  [place]  [facility] [place] [facility] [place]  Service: General;  Laterality: Left;    CARDIAC CATHETERIZATION N/A 05/12/2020    Procedure: Coronary angiography;  Surgeon: Víctor Hernandez MD;  Location:  [facility];  Service: Cardiovascular;  Laterality: N/A;    CARDIAC CATHETERIZATION N/A 05/12/2020    Procedure: Left heart cath;  Surgeon: Víctor Hernandez MD;  Location:  [facility];  Service: Cardiovascular;  Laterality: N/A;    CARDIAC CATHETERIZATION N/A 05/12/2020    Procedure: Left ventriculography;  Surgeon: Víctor Hernandez MD;  Location:  [facility];  Service: Cardiovascular;  Laterality: N/A;    CARDIAC CATHETERIZATION N/A 05/12/2020    Procedure: Right heart cath with shunt run;  Surgeon: Víctor Hernandez MD;  Location:  [facility];  Service: Cardiovascular;  Laterality: N/A;    CARPAL TUNNEL RELEASE Right     COLON SURGERY      COLON RESECTION FOR ENDOMETROSIS    COLONOSCOPY      normal less than 10 years ago    EYE SURGERY      Cataracts    HYSTERECTOMY      OOPHORECTOMY      TOE SURGERY Right     right big toe replecemnet 12 years ago    TONSILLECTOMY      TOTAL KNEE ARTHROPLASTY Right 06/02/2021    Procedure: TOTAL KNEE ARTHROPLASTY, with left knee steroid  injection;  Surgeon: Shamar Boone MD;  Location: [facility] OR;  Service: Orthopedics;  Laterality: Right;        Family History   Problem Relation Age of Onset    Breast cancer Mother 76    Arthritis Mother     Hypertension Mother     Heart disease Father         And his father    Heart attack Father     Hyperlipidemia Father     No Known Problems Sister     Heart disease Sister     No Known Problems Brother     Prostate cancer Maternal Grandfather     Heart disease Paternal Grandfather     Heart attack Paternal Grandfather     Malig Hyperthermia Neg Hx         Social History     Socioeconomic History    Marital status: Single    Number of children: 2   Tobacco Use    Smoking status: Former     Current packs/day: 0.00     Average packs/day: 1.5 packs/day for 15.0 years (22.5 ttl pk-yrs)     Types: Cigarettes     Start date: 1964     Quit date: 1977     Years since quittin.4    Smokeless tobacco: Never   Vaping Use    Vaping status: Never Used   Substance and Sexual Activity    Alcohol use: Yes     Comment: Once a month    Drug use: Never    Sexual activity: Not Currently     Partners: Male     Birth control/protection: None        OB History          2    Para   2    Term   2            AB        Living             SAB        IAB        Ectopic        Molar        Multiple        Live Births                 Age at menarche-14   3 para 2  1  Age at menopause-32    No Known Allergies       Review of Systems   Constitutional: Negative.    HENT: Negative.     Eyes: Negative.    Respiratory: Negative.     Cardiovascular: Negative.    Gastrointestinal: Negative.    Endocrine: Negative.    Genitourinary: Negative.    Musculoskeletal: Negative.    Skin: Negative.    Allergic/Immunologic: Negative.    Neurological: Negative.    Hematological: Negative.    Psychiatric/Behavioral: Negative.       Review of systems as mentioned HPI otherwise negative    Objective   Blood pressure  "109/67, pulse 59, temperature 97.9 °F (36.6 °C), resp. rate 15, height 157.5 cm (62\"), weight 72.6 kg (160 lb), SpO2 97%.   Physical Exam  Vitals reviewed.   Constitutional:       Appearance: Normal appearance. She is normal weight.   HENT:      Head: Normocephalic and atraumatic.      Right Ear: External ear normal.      Left Ear: External ear normal.      Nose: Nose normal.      Mouth/Throat:      Pharynx: Oropharynx is clear.   Eyes:      Extraocular Movements: Extraocular movements intact.      Pupils: Pupils are equal, round, and reactive to light.   Cardiovascular:      Rate and Rhythm: Normal rate.      Pulses: Normal pulses.   Pulmonary:      Effort: Pulmonary effort is normal.   Abdominal:      General: Abdomen is flat.   Musculoskeletal:         General: Normal range of motion.      Cervical back: Normal range of motion.   Skin:     General: Skin is warm.   Neurological:      General: No focal deficit present.      Mental Status: She is alert. Mental status is at baseline.   Psychiatric:         Mood and Affect: Mood normal.         Behavior: Behavior normal.         Thought Content: Thought content normal.         Judgment: Judgment normal.       Breast Exam: Right breast appears normal on inspection.  No palpable abnormalities of the right breast.  Left breast incision at left breast upper outer quadrant extending into the left axilla  is healing well.    I have reexamined the patient and the results are consistent with the previously documented exam. Vivian Stauffer MD      Lab on 06/03/2024   Component Date Value Ref Range Status    WBC 06/03/2024 6.58  3.40 - 10.80 10*3/mm3 Final    RBC 06/03/2024 4.15  3.77 - 5.28 10*6/mm3 Final    Hemoglobin 06/03/2024 12.5  12.0 - 15.9 g/dL Final    Hematocrit 06/03/2024 37.4  34.0 - 46.6 % Final    MCV 06/03/2024 90.1  79.0 - 97.0 fL Final    MCH 06/03/2024 30.1  26.6 - 33.0 pg Final    MCHC 06/03/2024 33.4  31.5 - 35.7 g/dL Final    RDW 06/03/2024 12.4  12.3 - " 15.4 % Final    RDW-SD 06/03/2024 40.6  37.0 - 54.0 fl Final    MPV 06/03/2024 8.7  6.0 - 12.0 fL Final    Platelets 06/03/2024 208  140 - 450 10*3/mm3 Final    Neutrophil % 06/03/2024 67.6  42.7 - 76.0 % Final    Lymphocyte % 06/03/2024 16.6 (L)  19.6 - 45.3 % Final    Monocyte % 06/03/2024 12.2 (H)  5.0 - 12.0 % Final    Eosinophil % 06/03/2024 2.6  0.3 - 6.2 % Final    Basophil % 06/03/2024 0.5  0.0 - 1.5 % Final    Immature Grans % 06/03/2024 0.5  0.0 - 0.5 % Final    Neutrophils, Absolute 06/03/2024 4.46  1.70 - 7.00 10*3/mm3 Final    Lymphocytes, Absolute 06/03/2024 1.09  0.70 - 3.10 10*3/mm3 Final    Monocytes, Absolute 06/03/2024 0.80  0.10 - 0.90 10*3/mm3 Final    Eosinophils, Absolute 06/03/2024 0.17  0.00 - 0.40 10*3/mm3 Final    Basophils, Absolute 06/03/2024 0.03  0.00 - 0.20 10*3/mm3 Final    Immature Grans, Absolute 06/03/2024 0.03  0.00 - 0.05 10*3/mm3 Final    nRBC 06/03/2024 0.0  0.0 - 0.2 /100 WBC Final        No radiology results for the last 30 days.         Assessment & Plan       *Left breast invasive ductal carcinoma  pT1 aN0 M0, stage Ia, grade 1, ER/WY strongly positive, HER2 negative, Ki-67 10%  Given the small size of the tumor and strong ER/WY positivity and her age there is likely not much benefit from radiation.  Discussed referring to radiation of oncology however patient does not want to do so.  Patient started on anastrozole 1 mg p.o. daily and May 2023.  She is tolerating that well  Currently no evidence of recurrent disease  Screening mammogram May 2024 negative    *Bone health  2/22/2023-DEXA shows osteopenia with a T score of -2.2 in the right hip and -1.2 in the left hip.  She is tolerating Fosamax well.  Started in May 2023.  Repeat DEXA February 2025  Continue calcium and vitamin D    *Hypertension-continue hydrochlorothiazide and amlodipine  Blood pressure 109/67    *Hyperlipidemia-continue atorvastatin, stable    *Follow-up-6 months    Patient is on medications requiring  close monitoring for toxicities.

## 2024-06-05 RX ORDER — ALENDRONATE SODIUM 70 MG/1
70 TABLET ORAL
Qty: 12 TABLET | Refills: 3 | Status: SHIPPED | OUTPATIENT
Start: 2024-06-05

## 2024-06-05 NOTE — TELEPHONE ENCOUNTER
Caller: Joi Aleman    Relationship: Self    Best call back number: [phone]    Requested Prescriptions:   Requested Prescriptions     Pending Prescriptions Disp Refills    alendronate (Fosamax) 70 MG tablet 12 tablet 3     Sig: Take 1 tablet by mouth Every 7 (Seven) Days. For bone health        Pharmacy where request should be sent: [facility] - [facility] [address] - [phone]  - [phone] FX     Last office visit with prescribing clinician: 6/3/2024   Last telemedicine visit with prescribing clinician: Visit date not found   Next office visit with prescribing clinician: 6/4/2025       Does the patient have less than a 3 day supply:  [] Yes  [x] No      Benedicto Rosario Rep   06/05/24 10:28 EDT

## 2024-06-06 ENCOUNTER — TELEPHONE (OUTPATIENT)
Dept: ONCOLOGY | Facility: CLINIC | Age: 79
End: 2024-06-06
Payer: MEDICARE

## 2024-06-25 ENCOUNTER — TELEPHONE (OUTPATIENT)
Dept: CARDIOLOGY | Facility: CLINIC | Age: 79
End: 2024-06-25

## 2024-06-25 ENCOUNTER — TELEPHONE (OUTPATIENT)
Dept: CARDIOLOGY | Facility: CLINIC | Age: 79
End: 2024-06-25
Payer: MEDICARE

## 2024-06-25 ENCOUNTER — HOSPITAL ENCOUNTER (OUTPATIENT)
Dept: GENERAL RADIOLOGY | Facility: HOSPITAL | Age: 79
Discharge: HOME | End: 2024-06-25
Admitting: NURSE PRACTITIONER
Payer: MEDICARE

## 2024-06-25 ENCOUNTER — OFFICE VISIT (OUTPATIENT)
Dept: CARDIOLOGY | Facility: CLINIC | Age: 79
End: 2024-06-25
Payer: MEDICARE

## 2024-06-25 VITALS
BODY MASS INDEX: 29 KG/M2 | HEIGHT: 62 IN | HEART RATE: 64 BPM | SYSTOLIC BLOOD PRESSURE: 108 MMHG | OXYGEN SATURATION: 96 % | DIASTOLIC BLOOD PRESSURE: 60 MMHG | WEIGHT: 157.6 LBS

## 2024-06-25 DIAGNOSIS — I10 ESSENTIAL HYPERTENSION: ICD-10-CM

## 2024-06-25 DIAGNOSIS — I10 ESSENTIAL HYPERTENSION: Primary | ICD-10-CM

## 2024-06-25 DIAGNOSIS — R06.02 SHORTNESS OF BREATH: ICD-10-CM

## 2024-06-25 DIAGNOSIS — R06.02 SHORTNESS OF BREATH: Primary | ICD-10-CM

## 2024-06-25 PROCEDURE — 3078F DIAST BP <80 MM HG: CPT | Performed by: NURSE PRACTITIONER

## 2024-06-25 PROCEDURE — 1160F RVW MEDS BY RX/DR IN RCRD: CPT | Performed by: NURSE PRACTITIONER

## 2024-06-25 PROCEDURE — 3074F SYST BP LT 130 MM HG: CPT | Performed by: NURSE PRACTITIONER

## 2024-06-25 PROCEDURE — 1159F MED LIST DOCD IN RCRD: CPT | Performed by: NURSE PRACTITIONER

## 2024-06-25 PROCEDURE — 99214 OFFICE O/P EST MOD 30 MIN: CPT | Performed by: NURSE PRACTITIONER

## 2024-06-25 PROCEDURE — 71046 X-RAY EXAM CHEST 2 VIEWS: CPT

## 2024-06-25 PROCEDURE — 93000 ELECTROCARDIOGRAM COMPLETE: CPT | Performed by: NURSE PRACTITIONER

## 2024-06-25 RX ORDER — FAMOTIDINE 20 MG/1
20 TABLET, FILM COATED ORAL
COMMUNITY
Start: 2024-04-15 | End: 2025-04-15

## 2024-06-25 NOTE — TELEPHONE ENCOUNTER
----- Message from Nannette Mathur sent at 6/25/2024  2:21 PM EDT -----  Please let her know that chest x-ray does not have any significant findings

## 2024-06-25 NOTE — PROGRESS NOTES
Date of Office Visit: 2024  Encounter Provider: JANKI Reynoso  Place of Service: [facility] CARDIOLOGY  Patient Name: Joi Aleman  :1945    Chief Complaint   Patient presents with    Hypertension   :     HPI: Joi Aleman is a 78 y.o. female patient of Dr. Estrada's.  She has a longstanding history of fatigue and shortness of breath.  In , she underwent an extensive workup including a right and left heart cath, cardiac CTA, and RICARDA.  There were some minor abnormalities; however, nothing that would explain the profound fatigue or dyspnea.  She has a very mild LAD bridge but no significant CAD.    She was last seen in the office by Dr. Estrada in May 2023 for continued profound fatigue, exercise tolerance, shortness of breath, and lightheadedness.  She had recently been diagnosed with breast cancer and was meeting with her oncologist the following week.  Oxygen saturations maintained in the mid to high 90s at rest and with ambulation.  Orthostatics were normal.  Her lung exam was normal.  She had basic labs demonstrating a normal BMP, proBNP, TSH, and CBC. She was asked to hold the amlodipine as her blood pressure was on the low side. For thoroughness sake, a repeat echocardiogram was ordered.  This echocardiogram demonstrated normal LV function, mild aortic regurgitation, and mild mitral regurgitation.  No changes were recommended.    She continues reporting the same symptoms including profound fatigue, shortness of breath, lightheadedness, and generalized weakness.  Reportedly they are progressively worsening.  She denies any chest pain, palpitations, edema, or syncope.  As far as the breast cancer goes, she underwent a successful lumpectomy remains on hormone blocker therapy.  Unfortunately she does not have a medication list with her today and is not entirely clear on what she is taking.    Past Medical History:   Diagnosis Date    Agatston CAC score, <100      2020: 29    Anxiety and depression     Arthritis     Brain aneurysm     FOLLOWED AND THEN RELEASED HER YEARS AGO-NO ISSUES CURRENLTY    Breast cancer in female     LEFT    Frequent urination     Hyperlipidemia     Hypertension     Insomnia     Knee pain, bilateral     AT TIMES    Memory change     SINCE STROKE. UNABLE TO REMEBER LIST OF INSTUCTIONS    Myocardial bridge     very mild, mid-LAD    Osteoarthritis     SOB (shortness of breath) on exertion     Stroke Years ago    About 8 to 10 years ago       Past Surgical History:   Procedure Laterality Date    BLEPHAROPLASTY      BREAST BIOPSY Left 04/24/2023    BREAST LUMPECTOMY WITH SENTINEL NODE BIOPSY Left 5/25/2023    Procedure: left breast Chelsea guided lumpectomy with sentinel lymph node biopsy;  Surgeon: Alissa Allen MD;  [place]  [facility] [place] [facility] [place]  Service: General;  Laterality: Left;    CARDIAC CATHETERIZATION N/A 05/12/2020    Procedure: Coronary angiography;  Surgeon: Víctor Hernandez MD;  Location:  [facility] CATH INVASIVE LOCATION;  Service: Cardiovascular;  Laterality: N/A;    CARDIAC CATHETERIZATION N/A 05/12/2020    Procedure: Left heart cath;  Surgeon: Víctor Hernandez MD;  [facility];  Service: Cardiovascular;  Laterality: N/A;    CARDIAC CATHETERIZATION N/A 05/12/2020    Procedure: Left ventriculography;  Surgeon: Víctor Hernandez MD;  [facility];  Service: Cardiovascular;  Laterality: N/A;    CARDIAC CATHETERIZATION N/A 05/12/2020    Procedure: Right heart cath with shunt run;  Surgeon: Víctor Hernandez MD;  [facility];  Service: Cardiovascular;  Laterality: N/A;    CARPAL TUNNEL RELEASE Right     COLON SURGERY      COLON RESECTION FOR ENDOMETROSIS    COLONOSCOPY      normal less than 10 years ago    EYE SURGERY      Cataracts    HYSTERECTOMY      OOPHORECTOMY      TOE SURGERY Right     right big toe replecemnet 12 years ago    TONSILLECTOMY      TOTAL  KNEE ARTHROPLASTY Right 2021    Procedure: TOTAL KNEE ARTHROPLASTY, with left knee steroid injection;  Surgeon: Shamar Boone MD;  Location: [facility];  Service: Orthopedics;  Laterality: Right;       Social History     Socioeconomic History    Marital status: Single    Number of children: 2   Tobacco Use    Smoking status: Former     Current packs/day: 0.00     Average packs/day: 1.5 packs/day for 15.0 years (22.5 ttl pk-yrs)     Types: Cigarettes     Start date: 1964     Quit date: 1977     Years since quittin.4    Smokeless tobacco: Never   Vaping Use    Vaping status: Never Used   Substance and Sexual Activity    Alcohol use: Yes     Comment: Once a month    Drug use: Never    Sexual activity: Not Currently     Partners: Male     Birth control/protection: None       Family History   Problem Relation Age of Onset    Breast cancer Mother 76    Arthritis Mother     Hypertension Mother     Heart disease Father         And his father    Heart attack Father     Hyperlipidemia Father     No Known Problems Sister     Heart disease Sister     No Known Problems Brother     Prostate cancer Maternal Grandfather     Heart disease Paternal Grandfather     Heart attack Paternal Grandfather     Malig Hyperthermia Neg Hx        Review of Systems   Constitutional: Negative. Positive for malaise/fatigue.   Cardiovascular: Negative.  Positive for dyspnea on exertion. Negative for chest pain, leg swelling, orthopnea, paroxysmal nocturnal dyspnea and syncope.   Respiratory: Negative.     Hematologic/Lymphatic: Negative for bleeding problem.   Musculoskeletal:  Negative for falls.   Gastrointestinal:  Negative for melena.   Neurological:  Positive for weakness. Negative for dizziness and light-headedness.       No Known Allergies      Current Outpatient Medications:     alendronate (Fosamax) 70 MG tablet, Take 1 tablet by mouth Every 7 (Seven) Days. For bone health, Disp: 12 tablet, Rfl: 3    amLODIPine  "(NORVASC) 10 MG tablet, TAKE 1 TABLET EVERY DAY, Disp: 90 tablet, Rfl: 0    anastrozole (ARIMIDEX) 1 MG tablet, Take 1 tablet by mouth Daily. For breast cancer, Disp: 90 tablet, Rfl: 3    atorvastatin (LIPITOR) 20 MG tablet, TAKE 1 TABLET EVERY DAY, Disp: 90 tablet, Rfl: 0    famotidine (PEPCID) 20 MG tablet, Take 1 tablet by mouth., Disp: , Rfl:     hydroCHLOROthiazide (MICROZIDE) 12.5 MG capsule, TAKE 1 CAPSULE EVERY DAY, Disp: 90 capsule, Rfl: 3    loperamide (IMODIUM) 2 MG capsule, TAKE 4 CAPSULES BY MOUTH DAILY., Disp: 360 capsule, Rfl: 3    magnesium oxide (MAG-OX) 400 tablet tablet, Take 1 tablet by mouth Daily., Disp: , Rfl:     metoprolol tartrate (LOPRESSOR) 25 MG tablet, Take 1 tablet by mouth Daily., Disp: , Rfl:     oxybutynin (DITROPAN) 5 MG tablet, Take 1 tablet by mouth 2 (Two) Times a Day. Urge urination (Patient taking differently: Take 1 tablet by mouth Daily. Urge urination), Disp: 180 tablet, Rfl: 2    sertraline (ZOLOFT) 100 MG tablet, TAKE 2 TABLETS EVERY DAY, Disp: 180 tablet, Rfl: 1    traZODone (DESYREL) 100 MG tablet, TAKE 1 TABLET EVERY NIGHT, Disp: 90 tablet, Rfl: 3      Objective:     Vitals:    06/25/24 0929   BP: 108/60   BP Location: Left arm   Patient Position: Sitting   Cuff Size: Adult   Pulse: 64   SpO2: 96%   Weight: 71.5 kg (157 lb 9.6 oz)   Height: 157.5 cm (62\")     Body mass index is 28.83 kg/m².    PHYSICAL EXAM:    Neck:      Vascular: No JVD.   Pulmonary:      Effort: Pulmonary effort is normal.      Breath sounds: Normal breath sounds.   Cardiovascular:      Normal rate. Regular rhythm.      Murmurs: There is a systolic murmur.      No gallop.  No click. No rub.   Pulses:     Intact distal pulses.           ECG 12 Lead    Date/Time: 6/25/2024 9:47 AM  Performed by: Carmen Vivar APRN    Authorized by: Carmen Vivar APRN  Comparison: compared with previous ECG from 5/31/2023  Similar to previous ECG  Rhythm: sinus rhythm  Rate: normal  BPM: 64          "   Assessment:       Diagnosis Plan   1. Essential hypertension  ECG 12 Lead      2. Shortness of breath  XR Chest 2 View    Ambulatory Referral to Pulmonology        Orders Placed This Encounter   Procedures    XR Chest 2 View     Standing Status:   Future     Number of Occurrences:   1     Standing Expiration Date:   6/25/2025     Order Specific Question:   Reason for Exam:     Answer:   shortness of breath     Order Specific Question:   Release to patient     Answer:   Routine Release [[id number]]    Ambulatory Referral to Pulmonology     Referral Priority:   Routine     Referral Type:   Consultation     Referral Reason:   Specialty Services Required     Requested Specialty:   Pulmonary Disease     Number of Visits Requested:   1    ECG 12 Lead     This order was created via procedure documentation     Order Specific Question:   Release to patient     Answer:   Routine Release [[id number]]          Plan:       1.  Hypertension.  Her blood pressure remains low.  Dr. Estrada advised her last year to hold the amlodipine.  However, it is still on her medication list.  She is not sure what she is taking.  I have asked her to call us when she returns home to verify her medications.  Further recommendations will be made at that time.      2.  Shortness of breath.  This is an ongoing issue.  She has undergone an extensive cardiac evaluation in the past.  I recommended a chest x-ray today followed by pulmonary evaluation.      Overall, I think her cardiac status is stable.  I will call her with the results of the above.       As always, it has been a pleasure to participate in your patient's care.      Sincerely,         JANKI Marcelo

## 2024-06-25 NOTE — TELEPHONE ENCOUNTER
Pt called to let you know she takes amlodipine 5mg daily. HCTZ 12.5mg daily, and she does not take metoprolol

## 2024-06-25 NOTE — TELEPHONE ENCOUNTER
Results called to pt.  Instructed to call with any further questions or concerns.  Verbalized understanding.    Karolyn Young RN  Triage Nurse, [facility] [place]  06/25/24 14:29 EDT

## 2024-06-26 RX ORDER — AMLODIPINE BESYLATE 5 MG/1
5 TABLET ORAL DAILY
Start: 2024-06-26 | End: 2024-06-26

## 2024-06-26 NOTE — TELEPHONE ENCOUNTER
Plan of care discussed with Dr. Estrada.  Given that it has been greater than a year since her last cardiac evaluation, she recommended proceeding with a stress test and echocardiogram.    I spoke to the patient regarding this information.  In addition, I recommended discontinuing amlodipine secondary to borderline low blood pressure noted in the office yesterday.  She will continue monitoring her blood pressures.    Please schedule stress test and echo

## 2024-07-05 RX ORDER — ANASTROZOLE 1 MG/1
1 TABLET ORAL DAILY
Qty: 90 TABLET | Refills: 3 | Status: SHIPPED | OUTPATIENT
Start: 2024-07-05

## 2024-07-18 ENCOUNTER — TELEPHONE (OUTPATIENT)
Dept: CARDIOLOGY | Facility: CLINIC | Age: 79
End: 2024-07-18
Payer: MEDICARE

## 2024-07-19 ENCOUNTER — HOSPITAL ENCOUNTER (OUTPATIENT)
Dept: CARDIOLOGY | Facility: HOSPITAL | Age: 79
Discharge: HOME | End: 2024-07-19
Payer: MEDICARE

## 2024-07-19 VITALS — BODY MASS INDEX: 28.89 KG/M2 | HEIGHT: 62 IN | WEIGHT: 156.97 LBS

## 2024-07-19 VITALS
OXYGEN SATURATION: 97 % | BODY MASS INDEX: 28.89 KG/M2 | SYSTOLIC BLOOD PRESSURE: 130 MMHG | WEIGHT: 157 LBS | HEART RATE: 43 BPM | HEIGHT: 62 IN | DIASTOLIC BLOOD PRESSURE: 80 MMHG

## 2024-07-19 DIAGNOSIS — R06.02 SHORTNESS OF BREATH: ICD-10-CM

## 2024-07-19 LAB
BH CV NUCLEAR PRIOR STUDY: 2
BH CV REST NUCLEAR ISOTOPE DOSE: 11.9 MCI
BH CV STRESS BP STAGE 1: NORMAL
BH CV STRESS COMMENTS STAGE 1: NORMAL
BH CV STRESS DOSE REGADENOSON STAGE 1: 0.4
BH CV STRESS DURATION MIN STAGE 1: 0
BH CV STRESS DURATION SEC STAGE 1: 10
BH CV STRESS HR STAGE 1: 97
BH CV STRESS NUCLEAR ISOTOPE DOSE: 34.9 MCI
BH CV STRESS PROTOCOL 1: NORMAL
BH CV STRESS RECOVERY BP: NORMAL MMHG
BH CV STRESS RECOVERY HR: 110 BPM
BH CV STRESS STAGE 1: 1
LV EF NUC BP: 74 %
MAXIMAL PREDICTED HEART RATE: 144 BPM
PERCENT MAX PREDICTED HR: 67.36 %
STRESS BASELINE BP: NORMAL MMHG
STRESS BASELINE HR: 50 BPM
STRESS PERCENT HR: 79 %
STRESS POST EXERCISE DUR SEC: 10 SEC
STRESS POST PEAK BP: NORMAL MMHG
STRESS POST PEAK HR: 97 BPM
STRESS TARGET HR: 122 BPM

## 2024-07-19 PROCEDURE — 0 TECHNETIUM TETROFOSMIN KIT: Performed by: NURSE PRACTITIONER

## 2024-07-19 PROCEDURE — 25010000002 REGADENOSON 0.4 MG/5ML SOLUTION: Performed by: NURSE PRACTITIONER

## 2024-07-19 PROCEDURE — 78452 HT MUSCLE IMAGE SPECT MULT: CPT

## 2024-07-19 PROCEDURE — A9502 TC99M TETROFOSMIN: HCPCS | Performed by: NURSE PRACTITIONER

## 2024-07-19 PROCEDURE — 93306 TTE W/DOPPLER COMPLETE: CPT

## 2024-07-19 PROCEDURE — 93017 CV STRESS TEST TRACING ONLY: CPT

## 2024-07-19 RX ORDER — REGADENOSON 0.08 MG/ML
0.4 INJECTION, SOLUTION INTRAVENOUS
Status: COMPLETED | OUTPATIENT
Start: 2024-07-19 | End: 2024-07-19

## 2024-07-19 RX ADMIN — REGADENOSON 0.4 MG: 0.08 INJECTION, SOLUTION INTRAVENOUS at 13:30

## 2024-07-19 RX ADMIN — TETROFOSMIN 1 DOSE: 1.38 INJECTION, POWDER, LYOPHILIZED, FOR SOLUTION INTRAVENOUS at 11:00

## 2024-07-19 RX ADMIN — TETROFOSMIN 1 DOSE: 1.38 INJECTION, POWDER, LYOPHILIZED, FOR SOLUTION INTRAVENOUS at 12:10

## 2024-07-21 LAB
AORTIC ARCH: 2.4 CM
AORTIC DIMENSIONLESS INDEX: 0.8 (DI)
ASCENDING AORTA: 3.4 CM
BH CV ECHO MEAS - ACS: 1.94 CM
BH CV ECHO MEAS - AI P1/2T: 581.3 MSEC
BH CV ECHO MEAS - AO MAX PG: 14.6 MMHG
BH CV ECHO MEAS - AO MEAN PG: 8 MMHG
BH CV ECHO MEAS - AO ROOT DIAM: 3.4 CM
BH CV ECHO MEAS - AO V2 MAX: 191 CM/SEC
BH CV ECHO MEAS - AO V2 VTI: 46.7 CM
BH CV ECHO MEAS - AVA(I,D): 2.2 CM2
BH CV ECHO MEAS - EDV(CUBED): 142.5 ML
BH CV ECHO MEAS - EDV(MOD-SP2): 65 ML
BH CV ECHO MEAS - EDV(MOD-SP4): 76 ML
BH CV ECHO MEAS - EF(MOD-BP): 69.5 %
BH CV ECHO MEAS - EF(MOD-SP2): 66.2 %
BH CV ECHO MEAS - EF(MOD-SP4): 71.1 %
BH CV ECHO MEAS - EF_3D-VOL: 66 %
BH CV ECHO MEAS - ESV(CUBED): 44.9 ML
BH CV ECHO MEAS - ESV(MOD-SP2): 22 ML
BH CV ECHO MEAS - ESV(MOD-SP4): 22 ML
BH CV ECHO MEAS - FS: 31.9 %
BH CV ECHO MEAS - IVS/LVPW: 1.14 CM
BH CV ECHO MEAS - IVSD: 1.26 CM
BH CV ECHO MEAS - LAT PEAK E' VEL: 6.3 CM/SEC
BH CV ECHO MEAS - LV DIASTOLIC VOL/BSA (35-75): 44.1 CM2
BH CV ECHO MEAS - LV MASS(C)D: 243.9 GRAMS
BH CV ECHO MEAS - LV MAX PG: 9.2 MMHG
BH CV ECHO MEAS - LV MEAN PG: 4 MMHG
BH CV ECHO MEAS - LV SYSTOLIC VOL/BSA (12-30): 12.8 CM2
BH CV ECHO MEAS - LV V1 MAX: 152 CM/SEC
BH CV ECHO MEAS - LV V1 VTI: 37 CM
BH CV ECHO MEAS - LVIDD: 5.2 CM
BH CV ECHO MEAS - LVIDS: 3.6 CM
BH CV ECHO MEAS - LVOT AREA: 2.8 CM2
BH CV ECHO MEAS - LVOT DIAM: 1.88 CM
BH CV ECHO MEAS - LVPWD: 1.1 CM
BH CV ECHO MEAS - MED PEAK E' VEL: 4.8 CM/SEC
BH CV ECHO MEAS - MR MAX PG: 149.2 MMHG
BH CV ECHO MEAS - MR MAX VEL: 610.8 CM/SEC
BH CV ECHO MEAS - MV A DUR: 0.11 SEC
BH CV ECHO MEAS - MV A MAX VEL: 97.5 CM/SEC
BH CV ECHO MEAS - MV DEC SLOPE: 468.5 CM/SEC2
BH CV ECHO MEAS - MV DEC TIME: 0.21 SEC
BH CV ECHO MEAS - MV E MAX VEL: 127 CM/SEC
BH CV ECHO MEAS - MV E/A: 1.3
BH CV ECHO MEAS - MV MAX PG: 5.5 MMHG
BH CV ECHO MEAS - MV MEAN PG: 2.37 MMHG
BH CV ECHO MEAS - MV P1/2T: 70.4 MSEC
BH CV ECHO MEAS - MV V2 VTI: 47.5 CM
BH CV ECHO MEAS - MVA(P1/2T): 3.1 CM2
BH CV ECHO MEAS - MVA(VTI): 2.17 CM2
BH CV ECHO MEAS - PA ACC TIME: 0.09 SEC
BH CV ECHO MEAS - PA V2 MAX: 90.9 CM/SEC
BH CV ECHO MEAS - PULM A REVS DUR: 0.11 SEC
BH CV ECHO MEAS - PULM A REVS VEL: 25.3 CM/SEC
BH CV ECHO MEAS - PULM DIAS VEL: 64.3 CM/SEC
BH CV ECHO MEAS - PULM S/D: 1.13
BH CV ECHO MEAS - PULM SYS VEL: 72.4 CM/SEC
BH CV ECHO MEAS - QP/QS: 0.76
BH CV ECHO MEAS - RAP SYSTOLE: 3 MMHG
BH CV ECHO MEAS - RV MAX PG: 1.61 MMHG
BH CV ECHO MEAS - RV V1 MAX: 63.4 CM/SEC
BH CV ECHO MEAS - RV V1 VTI: 15.2 CM
BH CV ECHO MEAS - RVOT DIAM: 2.6 CM
BH CV ECHO MEAS - RVSP: 31 MMHG
BH CV ECHO MEAS - SUP REN AO DIAM: 1.9 CM
BH CV ECHO MEAS - SV(LVOT): 102.9 ML
BH CV ECHO MEAS - SV(MOD-SP2): 43 ML
BH CV ECHO MEAS - SV(MOD-SP4): 54 ML
BH CV ECHO MEAS - SV(RVOT): 78.5 ML
BH CV ECHO MEAS - SVI(LVOT): 59.7 ML/M2
BH CV ECHO MEAS - SVI(MOD-SP2): 24.9 ML/M2
BH CV ECHO MEAS - SVI(MOD-SP4): 31.3 ML/M2
BH CV ECHO MEAS - TAPSE (>1.6): 2.03 CM
BH CV ECHO MEAS - TR MAX PG: 28.3 MMHG
BH CV ECHO MEAS - TR MAX VEL: 266 CM/SEC
BH CV ECHO MEASUREMENTS AVERAGE E/E' RATIO: 22.88
BH CV XLRA - RV BASE: 3.9 CM
BH CV XLRA - RV LENGTH: 6.3 CM
BH CV XLRA - RV MID: 2.8 CM
BH CV XLRA - TDI S': 10.3 CM/SEC
LEFT ATRIUM VOLUME INDEX: 33 ML/M2
MAXIMAL PREDICTED HEART RATE: 144
SINUS: 3.3 CM
STJ: 2.7 CM
STRESS TARGET HR: 122

## 2024-08-13 DIAGNOSIS — E78.2 MIXED HYPERLIPIDEMIA: ICD-10-CM

## 2024-08-13 RX ORDER — ATORVASTATIN CALCIUM 20 MG/1
TABLET, FILM COATED ORAL
Qty: 90 TABLET | Refills: 3 | OUTPATIENT
Start: 2024-08-13

## 2024-10-24 RX ORDER — LETROZOLE 2.5 MG/1
2.5 TABLET, FILM COATED ORAL DAILY
Qty: 30 TABLET | Refills: 3 | Status: SHIPPED | OUTPATIENT
Start: 2024-10-24

## 2024-10-28 RX ORDER — LETROZOLE 2.5 MG/1
2.5 TABLET, FILM COATED ORAL DAILY
Qty: 30 TABLET | Refills: 3 | OUTPATIENT
Start: 2024-10-28

## 2024-10-28 NOTE — TELEPHONE ENCOUNTER
Caller: Joi Aleman    Relationship: Self    Best call back number: [phone]    Requested Prescriptions:   Requested Prescriptions     Pending Prescriptions Disp Refills    letrozole (FEMARA) 2.5 MG tablet 30 tablet 3     Sig: Take 1 tablet by mouth Daily.        Pharmacy where request should be sent: [facility][id number] [address] AT [facility] [phone] [facility] [phone] FX     Last office visit with prescribing clinician: 6/3/2024   Last telemedicine visit with prescribing clinician: Visit date not found   Next office visit with prescribing clinician: 6/4/2025     Additional details provided by patient: WAS SENT TO INCORRECT PHARMACY ON 10/24/24    Does the patient have less than a 3 day supply:  [x] Yes  [] No    Would you like a call back once the refill request has been completed: [] Yes [x] No    If the office needs to give you a call back, can they leave a voicemail: [] Yes [x] No

## 2024-10-29 ENCOUNTER — TELEPHONE (OUTPATIENT)
Dept: ONCOLOGY | Facility: CLINIC | Age: 79
End: 2024-10-29
Payer: MEDICARE

## 2024-10-29 RX ORDER — LETROZOLE 2.5 MG/1
2.5 TABLET, FILM COATED ORAL DAILY
Qty: 30 TABLET | Refills: 3 | Status: SHIPPED | OUTPATIENT
Start: 2024-10-29

## 2024-10-29 NOTE — TELEPHONE ENCOUNTER
Caller: Joi Aleman    Relationship: Self    Best call back number: [phone]    What is the best time to reach you: ASAP    Who are you requesting to speak with (clinical staff, provider,  specific staff member): BASILIA    What was the call regarding: PRESP. REQUEST WAS SENT 10/28/24 FOR FEMARA - BECAUSE IT WAS SENT TO WRONG PHARMACY ON 10/24/24 - IT LOOKS LIKE IT WAS SENT TO [facility] AGAIN BUT NEEDS TO GO TO Pharmacy where request should be sent: [facility] #[id number] [address] AT [facility] - [phone]  - [phone] FX   PLEASE CALL PT TO CONFIRM WHEN THE PRESP. HAS BEEN SENT.    Is it okay if the provider responds through MyChart: NO

## 2024-10-29 NOTE — TELEPHONE ENCOUNTER
Called the patient to let her know the femara was sent to her requested [facility]. Patient did not answer, but a v/m was left

## 2024-11-24 ENCOUNTER — HOSPITAL ENCOUNTER (EMERGENCY)
Facility: HOSPITAL | Age: 79
Discharge: HOME | End: 2024-11-24
Attending: EMERGENCY MEDICINE | Admitting: EMERGENCY MEDICINE
Payer: MEDICARE

## 2024-11-24 ENCOUNTER — APPOINTMENT (OUTPATIENT)
Dept: GENERAL RADIOLOGY | Facility: HOSPITAL | Age: 79
End: 2024-11-24
Payer: MEDICARE

## 2024-11-24 VITALS
DIASTOLIC BLOOD PRESSURE: 77 MMHG | TEMPERATURE: 97.8 F | OXYGEN SATURATION: 93 % | SYSTOLIC BLOOD PRESSURE: 122 MMHG | BODY MASS INDEX: 28.52 KG/M2 | HEART RATE: 66 BPM | WEIGHT: 155 LBS | RESPIRATION RATE: 20 BRPM | HEIGHT: 62 IN

## 2024-11-24 DIAGNOSIS — R53.83 OTHER FATIGUE: ICD-10-CM

## 2024-11-24 DIAGNOSIS — R55 NEAR SYNCOPE: Primary | ICD-10-CM

## 2024-11-24 LAB
ANION GAP SERPL CALCULATED.3IONS-SCNC: 7.6 MMOL/L (ref 5–15)
BASOPHILS # BLD AUTO: 0.04 10*3/MM3 (ref 0–0.2)
BASOPHILS NFR BLD AUTO: 0.6 % (ref 0–1.5)
BUN SERPL-MCNC: 13 MG/DL (ref 8–23)
BUN/CREAT SERPL: 14.1 (ref 7–25)
CALCIUM SPEC-SCNC: 9.2 MG/DL (ref 8.6–10.5)
CHLORIDE SERPL-SCNC: 101 MMOL/L (ref 98–107)
CO2 SERPL-SCNC: 27.4 MMOL/L (ref 22–29)
CREAT SERPL-MCNC: 0.92 MG/DL (ref 0.57–1)
D DIMER PPP FEU-MCNC: 0.34 MCGFEU/ML (ref 0–0.77)
DEPRECATED RDW RBC AUTO: 42.4 FL (ref 37–54)
EGFRCR SERPLBLD CKD-EPI 2021: 64.3 ML/MIN/1.73
EOSINOPHIL # BLD AUTO: 0.16 10*3/MM3 (ref 0–0.4)
EOSINOPHIL NFR BLD AUTO: 2.3 % (ref 0.3–6.2)
ERYTHROCYTE [DISTWIDTH] IN BLOOD BY AUTOMATED COUNT: 13 % (ref 12.3–15.4)
GEN 5 2HR TROPONIN T REFLEX: 12 NG/L
GLUCOSE BLDC GLUCOMTR-MCNC: 95 MG/DL (ref 70–130)
GLUCOSE SERPL-MCNC: 103 MG/DL (ref 65–99)
HCT VFR BLD AUTO: 36.9 % (ref 34–46.6)
HGB BLD-MCNC: 12.3 G/DL (ref 12–15.9)
HOLD SPECIMEN: NORMAL
HOLD SPECIMEN: NORMAL
IMM GRANULOCYTES # BLD AUTO: 0.03 10*3/MM3 (ref 0–0.05)
IMM GRANULOCYTES NFR BLD AUTO: 0.4 % (ref 0–0.5)
LYMPHOCYTES # BLD AUTO: 1.42 10*3/MM3 (ref 0.7–3.1)
LYMPHOCYTES NFR BLD AUTO: 20.2 % (ref 19.6–45.3)
MCH RBC QN AUTO: 30.1 PG (ref 26.6–33)
MCHC RBC AUTO-ENTMCNC: 33.3 G/DL (ref 31.5–35.7)
MCV RBC AUTO: 90.2 FL (ref 79–97)
MONOCYTES # BLD AUTO: 0.8 10*3/MM3 (ref 0.1–0.9)
MONOCYTES NFR BLD AUTO: 11.4 % (ref 5–12)
NEUTROPHILS NFR BLD AUTO: 4.59 10*3/MM3 (ref 1.7–7)
NEUTROPHILS NFR BLD AUTO: 65.1 % (ref 42.7–76)
NRBC BLD AUTO-RTO: 0 /100 WBC (ref 0–0.2)
NT-PROBNP SERPL-MCNC: 252 PG/ML (ref 0–1800)
PLATELET # BLD AUTO: 209 10*3/MM3 (ref 140–450)
PMV BLD AUTO: 8.7 FL (ref 6–12)
POTASSIUM SERPL-SCNC: 4.2 MMOL/L (ref 3.5–5.2)
QT INTERVAL: 443 MS
QTC INTERVAL: 450 MS
RBC # BLD AUTO: 4.09 10*6/MM3 (ref 3.77–5.28)
SODIUM SERPL-SCNC: 136 MMOL/L (ref 136–145)
TROPONIN T DELTA: 2 NG/L
TROPONIN T SERPL HS-MCNC: 10 NG/L
WBC NRBC COR # BLD AUTO: 7.04 10*3/MM3 (ref 3.4–10.8)
WHOLE BLOOD HOLD COAG: NORMAL
WHOLE BLOOD HOLD SPECIMEN: NORMAL

## 2024-11-24 PROCEDURE — 93005 ELECTROCARDIOGRAM TRACING: CPT | Performed by: EMERGENCY MEDICINE

## 2024-11-24 PROCEDURE — 83880 ASSAY OF NATRIURETIC PEPTIDE: CPT | Performed by: EMERGENCY MEDICINE

## 2024-11-24 PROCEDURE — 99284 EMERGENCY DEPT VISIT MOD MDM: CPT

## 2024-11-24 PROCEDURE — 85379 FIBRIN DEGRADATION QUANT: CPT | Performed by: EMERGENCY MEDICINE

## 2024-11-24 PROCEDURE — 71045 X-RAY EXAM CHEST 1 VIEW: CPT

## 2024-11-24 PROCEDURE — 80048 BASIC METABOLIC PNL TOTAL CA: CPT | Performed by: EMERGENCY MEDICINE

## 2024-11-24 PROCEDURE — 85025 COMPLETE CBC W/AUTO DIFF WBC: CPT | Performed by: EMERGENCY MEDICINE

## 2024-11-24 PROCEDURE — 84484 ASSAY OF TROPONIN QUANT: CPT | Performed by: EMERGENCY MEDICINE

## 2024-11-24 PROCEDURE — 82948 REAGENT STRIP/BLOOD GLUCOSE: CPT

## 2024-11-24 PROCEDURE — 36415 COLL VENOUS BLD VENIPUNCTURE: CPT

## 2024-11-24 RX ORDER — SODIUM CHLORIDE 0.9 % (FLUSH) 0.9 %
10 SYRINGE (ML) INJECTION AS NEEDED
Status: DISCONTINUED | OUTPATIENT
Start: 2024-11-24 | End: 2024-11-24 | Stop reason: HOSPADM

## 2024-11-24 RX ADMIN — Medication 10 ML: at 13:45

## 2024-11-24 NOTE — ED NOTES
Pt returned from [facility]roo and wanted to walk around the room to try to see if she would feel symptoms. She states the symptoms occur only after she has walked around or been up for awhile moving around. I waited over 15 min to recheck the blood pressure documented on the chart. MABLE Sanchez updated

## 2024-11-24 NOTE — ED PROVIDER NOTES
EMERGENCY DEPARTMENT ENCOUNTER    Room Number:  19/19  PCP: Leah Messina MD    HPI:  Chief Complaint: Lightheadedness  A complete HPI/ROS/PMH/PSH/SH/FH are unobtainable due to: None  Context: Joi Aleman is a 79 y.o. female who presents to the ED c/o acute lightheadedness which has been ongoing for a few years which has been worse over the past few months.  She is seen multiple doctors about this in the past and feels frustrated that she has not been able to have an explanation for the symptoms.  She states that this occurs and ever she moves around too much.  For example, she was grocery shopping recently and felt short of breath when she was grocery shopping.  She is currently being treated with a hormone blocker for breast cancer.  She has seen cardiology and had a negative stress test and essentially normal echocardiogram.  She is also had negative pulmonary function test.  She is also had decreased exercise tolerance over the past year.  Family at bedside states that she used to work a lot outside but now has to moderate her level of exertion.        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    Agatston CAC score, <100     Stress incontinence 07/07/2022    Skin tag 07/07/2022    Urge incontinence 01/10/2023    Colon cancer screening 02/15/2023    Malignant neoplasm of female breast 05/09/2023     Resolved Ambulatory Problems     Diagnosis Date Noted    Brain aneurysm 02/03/2020     Past Medical History:   Diagnosis Date    Anxiety and depression     Breast cancer in female      Frequent urination     Hyperlipidemia     Hypertension     Insomnia     Knee pain, bilateral     Memory change     Osteoarthritis     SOB (shortness of breath) on exertion     Stroke Years ago         PAST SURGICAL HISTORY  Past Surgical History:   Procedure Laterality Date    BLEPHAROPLASTY      BREAST BIOPSY Left 04/24/2023    BREAST LUMPECTOMY WITH SENTINEL NODE BIOPSY Left 5/25/2023    Procedure: left breast [place] guided lumpectomy with sentinel lymph node biopsy;  Surgeon: Alissa Allen MD;  Location: [facility] OR [facility];  Service: General;  Laterality: Left;    CARDIAC CATHETERIZATION N/A 05/12/2020    Procedure: Coronary angiography;  Surgeon: Víctor Hernandez MD;  Location:  [facility] CATH INVASIVE LOCATION;  Service: Cardiovascular;  Laterality: N/A;    CARDIAC CATHETERIZATION N/A 05/12/2020    Procedure: Left heart cath;  Surgeon: Víctor Hernandez MD;  Location: [facility] CATH INVASIVE LOCATION;  Service: Cardiovascular;  Laterality: N/A;    CARDIAC CATHETERIZATION N/A 05/12/2020    Procedure: Left ventriculography;  Surgeon: Víctor Hernandez MD;  Location[facility] C[facility];  Service: Cardiovascular;  Laterality: N/A;    CARDIAC CATHETERIZATION N/A 05/12/2020    Procedure: Right heart cath with shunt run;  Surgeon: Víctor Hernandez MD;  Location: [facility] CATH INVASIVE LOCATION;  Service: Cardiovascular;  Laterality: N/A;    CARPAL TUNNEL RELEASE Right     COLON SURGERY      COLON RESECTION FOR ENDOMETROSIS    COLONOSCOPY      normal less than 10 years ago    EYE SURGERY      Cataracts    HYSTERECTOMY      OOPHORECTOMY      TOE SURGERY Right     right big toe replecemnet 12 years ago    TONSILLECTOMY      TOTAL KNEE ARTHROPLASTY Right 06/02/2021    Procedure: TOTAL KNEE ARTHROPLASTY, with left knee steroid injection;  Surgeon: Shamar Boone MD;  Location: [facility] MAIN OR;  Service: Orthopedics;  Laterality: Right;         FAMILY HISTORY  Family History   Problem Relation Age of Onset    Breast  cancer Mother 76    Arthritis Mother     Hypertension Mother     Heart disease Father         And his father    Heart attack Father     Hyperlipidemia Father     No Known Problems Sister     Heart disease Sister     No Known Problems Brother     Prostate cancer Maternal Grandfather     Heart disease Paternal Grandfather     Heart attack Paternal Grandfather     Malig Hyperthermia Neg Hx          SOCIAL HISTORY  Social History     Socioeconomic History    Marital status: Single    Number of children: 2   Tobacco Use    Smoking status: Former     Current packs/day: 0.00     Average packs/day: 1.5 packs/day for 15.0 years (22.5 ttl pk-yrs)     Types: Cigarettes     Start date: 1964     Quit date: 1977     Years since quittin.8    Smokeless tobacco: Never   Vaping Use    Vaping status: Never Used   Substance and Sexual Activity    Alcohol use: Yes     Comment: Once a month    Drug use: Never    Sexual activity: Not Currently     Partners: Male     Birth control/protection: None         ALLERGIES  Patient has no known allergies.        REVIEW OF SYSTEMS  Review of Systems     Included in HPI  All systems reviewed and negative except for those discussed in HPI.       PHYSICAL EXAM  ED Triage Vitals   Temp Heart Rate Resp BP SpO2   24 1045 24 1045 24 1045 24 1046 24 1045   97.8 °F (36.6 °C) 77 20 (!) 77/66 98 %      Temp src Heart Rate Source Patient Position BP Location FiO2 (%)   -- 24 1147 -- -- --    Monitor          Physical Exam      GENERAL: no acute distress  HENT: nares patent  EYES: no scleral icterus  CV: regular rhythm, normal rate, no appreciable murmur  RESPIRATORY: normal effort, clear to auscultation bilaterally  ABDOMEN: soft, nontender  MUSCULOSKELETAL: no deformity, no lower extremity edema or tenderness  NEURO: alert, moves all extremities, follows commands  PSYCH: Anxious affect  SKIN: warm, dry    Vital signs and nursing notes reviewed.          LAB  RESULTS  Recent Results (from the past 24 hours)   Green Top (Gel)    Collection Time: 11/24/24 11:45 AM   Result Value Ref Range    Extra Tube Hold for add-ons.    Lavender Top    Collection Time: 11/24/24 11:45 AM   Result Value Ref Range    Extra Tube hold for add-on    Gold Top - SST    Collection Time: 11/24/24 11:45 AM   Result Value Ref Range    Extra Tube Hold for add-ons.    Light Blue Top    Collection Time: 11/24/24 11:45 AM   Result Value Ref Range    Extra Tube Hold for add-ons.    Basic Metabolic Panel    Collection Time: 11/24/24 11:45 AM    Specimen: Blood   Result Value Ref Range    Glucose 103 (H) 65 - 99 mg/dL    BUN 13 8 - 23 mg/dL    Creatinine 0.92 0.57 - 1.00 mg/dL    Sodium 136 136 - 145 mmol/L    Potassium 4.2 3.5 - 5.2 mmol/L    Chloride 101 98 - 107 mmol/L    CO2 27.4 22.0 - 29.0 mmol/L    Calcium 9.2 8.6 - 10.5 mg/dL    BUN/Creatinine Ratio 14.1 7.0 - 25.0    Anion Gap 7.6 5.0 - 15.0 mmol/L    eGFR 64.3 >60.0 mL/min/1.73   CBC Auto Differential    Collection Time: 11/24/24 11:45 AM    Specimen: Blood   Result Value Ref Range    WBC 7.04 3.40 - 10.80 10*3/mm3    RBC 4.09 3.77 - 5.28 10*6/mm3    Hemoglobin 12.3 12.0 - 15.9 g/dL    Hematocrit 36.9 34.0 - 46.6 %    MCV 90.2 79.0 - 97.0 fL    MCH 30.1 26.6 - 33.0 pg    MCHC 33.3 31.5 - 35.7 g/dL    RDW 13.0 12.3 - 15.4 %    RDW-SD 42.4 37.0 - 54.0 fl    MPV 8.7 6.0 - 12.0 fL    Platelets 209 140 - 450 10*3/mm3    Neutrophil % 65.1 42.7 - 76.0 %    Lymphocyte % 20.2 19.6 - 45.3 %    Monocyte % 11.4 5.0 - 12.0 %    Eosinophil % 2.3 0.3 - 6.2 %    Basophil % 0.6 0.0 - 1.5 %    Immature Grans % 0.4 0.0 - 0.5 %    Neutrophils, Absolute 4.59 1.70 - 7.00 10*3/mm3    Lymphocytes, Absolute 1.42 0.70 - 3.10 10*3/mm3    Monocytes, Absolute 0.80 0.10 - 0.90 10*3/mm3    Eosinophils, Absolute 0.16 0.00 - 0.40 10*3/mm3    Basophils, Absolute 0.04 0.00 - 0.20 10*3/mm3    Immature Grans, Absolute 0.03 0.00 - 0.05 10*3/mm3    nRBC 0.0 0.0 - 0.2 /100 WBC   High  Sensitivity Troponin T    Collection Time: 11/24/24 11:45 AM    Specimen: Blood   Result Value Ref Range    HS Troponin T 10 <14 ng/L   D-dimer, Quantitative    Collection Time: 11/24/24 11:45 AM    Specimen: Blood   Result Value Ref Range    D-Dimer, Quantitative 0.34 0.00 - 0.77 MCGFEU/mL   BNP    Collection Time: 11/24/24 11:45 AM    Specimen: Blood   Result Value Ref Range    proBNP 252.0 0.0 - 1,800.0 pg/mL   POC Glucose Once    Collection Time: 11/24/24 11:55 AM    Specimen: Blood   Result Value Ref Range    Glucose 95 70 - 130 mg/dL   ECG 12 Lead Syncope    Collection Time: 11/24/24 12:59 PM   Result Value Ref Range    QT Interval 443 ms    QTC Interval 450 ms   High Sensitivity Troponin T 2Hr    Collection Time: 11/24/24  1:45 PM    Specimen: Blood   Result Value Ref Range    HS Troponin T 12 <14 ng/L    Troponin T Delta 2 >=-4 - <+4 ng/L       Ordered the above labs and reviewed the results.        RADIOLOGY  XR Chest 1 View    Result Date: 11/24/2024  XR CHEST 1 VW-11/24/2024  HISTORY: Near syncope.  Heart size is mildly enlarged. Patient is rotated slightly to the right. Lungs appear clear. There is some aortic calcification. Surgical clips overlie the left lower hemithorax.      1. Mild cardiomegaly. 2. Lungs appear clear   This report was finalized on 11/24/2024 12:47 PM by Dr. Marcus Taylor M.D on Workstation: [id number]       Ordered the above noted radiological studies. Reviewed by me in PACS.        MEDICATIONS GIVEN IN ER  Medications   sodium chloride 0.9 % flush 10 mL (has no administration in time range)   sodium chloride 0.9 % flush 10 mL (10 mL Intravenous Given 11/24/24 1345)         ORDERS PLACED DURING THIS VISIT:  Orders Placed This Encounter   Procedures    XR Chest 1 View    [place] Draw    Basic Metabolic Panel    CBC Auto Differential    High Sensitivity Troponin T    D-dimer, Quantitative    BNP    High Sensitivity Troponin T 2Hr    Monitor Blood Pressure    Continuous Pulse  Oximetry    Orthostatic Vitals    POC Glucose Once    ECG 12 Lead Syncope    Insert peripheral IV    Insert Peripheral IV    Green Top (Gel)    Lavender Top    Gold Top - SST    Light Blue Top    CBC & Differential         OUTPATIENT MEDICATION MANAGEMENT:  Current Facility-Administered Medications Ordered in Epic   Medication Dose Route Frequency Provider Last Rate Last Admin    sodium chloride 0.9 % flush 10 mL  10 mL Intravenous PRN Garett Kirby II, MD        sodium chloride 0.9 % flush 10 mL  10 mL Intravenous PRN Garett Kirby II, MD   10 mL at 11/24/24 1345     Current Outpatient Medications Ordered in Epic   Medication Sig Dispense Refill    alendronate (Fosamax) 70 MG tablet Take 1 tablet by mouth Every 7 (Seven) Days. For bone health 12 tablet 3    atorvastatin (LIPITOR) 20 MG tablet TAKE 1 TABLET EVERY DAY 90 tablet 0    famotidine (PEPCID) 20 MG tablet Take 1 tablet by mouth.      hydroCHLOROthiazide (MICROZIDE) 12.5 MG capsule TAKE 1 CAPSULE EVERY DAY 90 capsule 3    letrozole (FEMARA) 2.5 MG tablet Take 1 tablet by mouth Daily. 30 tablet 3    loperamide (IMODIUM) 2 MG capsule TAKE 4 CAPSULES BY MOUTH DAILY. 360 capsule 3    magnesium oxide (MAG-OX) 400 tablet tablet Take 1 tablet by mouth Daily.      oxybutynin (DITROPAN) 5 MG tablet Take 1 tablet by mouth 2 (Two) Times a Day. Urge urination (Patient taking differently: Take 1 tablet by mouth Daily. Urge urination) 180 tablet 2    sertraline (ZOLOFT) 100 MG tablet TAKE 2 TABLETS EVERY  tablet 1    traZODone (DESYREL) 100 MG tablet TAKE 1 TABLET EVERY NIGHT 90 tablet 3       PROCEDURES  Procedures          MEDICAL DECISION MAKING, PROGRESS, and CONSULTS    Discussion below represents my analysis of pertinent findings related to patient's condition, differential diagnosis, treatment plan and final disposition.            Differential diagnosis:    POTS, orthostatic hypotension, anxiety, pulmonary embolism, pneumonia, ACS            Independent interpretation of labs, radiology studies, and discussions with consultants:  ED Course as of 11/24/24 1417   Sun Nov 24, 2024   1206 On medical chart review, I reviewed the most recent progress note from Dr. Leah Messina on 10/3/2024 with internal medicine at [facility].  Patient was seen for blood pressure check and had multiple medical concerns.  She reports experiencing shortness of breath during activity such as grocery shopping or walking to her house often needing to rest.  Also experiences weakness after showering.  No difficulty swallowing or choking.  Experienced acid reflux during a walk around the block which was not relieved by Rolaids.  Pulmonary function test within normal limits.  Renal function hematological parameters are also normal.  Blood pressure well-controlled.  Patient is currently being treated for breast cancer. [TD]   1300 D-Dimer, Quant: 0.34 [TD]   1300 proBNP: 252.0 [TD]   1300 HS Troponin T: 10 [TD]   1305 EKG independently interpreted by myself.  Time 12:59 PM.  Sinus rhythm.  Heart rate 62.  Normal axis.  Normal intervals.  No acute ST abnormality. [TD]   1412 Troponin T Delta: 2 [TD]      ED Course User Index  [TD] Garett Kirby II, MD       Patient's workup in the ER is negative.  This is consistent with her prior workup as an outpatient with cardiology and pulmonology.  I do wonder how much underlying anxiety/depression this patient has potentially related to her breast cancer therapy.  Is also possible that she is having side effects from her medication for breast cancer.  I discussed this possibly of anxiety with the patient and her family.  Patient seem to be open to this possible explanation.  Family seems to really agree that the patient does have likely underlying anxiety has never been formally diagnosed in the past.        DIAGNOSIS  Final diagnoses:   Near syncope   Other fatigue          DISPOSITION  DISCHARGE    FOLLOW-UP  Leah Messina MD  [address]  [facility] [id number] [phone]    Schedule an appointment as soon as possible for a visit   As needed    Vivian Stauffer MD  [address] [facility] [address]  [facility] [id number] [phone]    Go to   as scheduled    [facility] EMERGENCY DEPARTMENT  [address] [facility] [address] [phone]  Go to   If symptoms worsen         Medication List        Changed      oxybutynin 5 MG tablet  Commonly known as: DITROPAN  Take 1 tablet by mouth 2 (Two) Times a Day. Urge urination  What changed: when to take this                  Latest Documented Vital Signs:  As of 14:17 EST  BP- 122/77 HR- 66 Temp- 97.8 °F (36.6 °C) O2 sat- 93%      --    Please note that portions of this were completed with a voice recognition program.       Note Disclaimer: At [facility], we believe that sharing information builds trust and better relationships. You are receiving this note because you are receiving care at [facility] or recently visited. It is possible you will see health information before a provider has talked with you about it. This kind of information can be easy to misunderstand. To help you fully understand what it means for your health, we urge you to discuss this note with your provider.         Garett Kirby II, MD  11/24/24 0918

## 2024-11-24 NOTE — ED NOTES
Patient to ER via car from home for blood pressure going up and down patient reports feeling like she is going to pass out   Is on BP medication took this morning

## 2024-11-26 NOTE — PROGRESS NOTES
Subjective   Joi Aleman is a 79 y.o. female.  Referred by Dr. Allen for left breast invasive ductal carcinoma.    History of Present Illness   Ms. Aleman is a 77-year-old lady with hypertension, hyperlipidemia presented with a screen detected abnormality of the left breast.    2/22/2023-bilateral screening mammogram  Left breast focal asymmetry with questioned architectural distortion.  No mammographic evidence of malignancy in the right breast.    3/29/2023-left breast diagnostic mammogram and ultrasound  There is a 0.4 cm hypoechoic mass with ill-defined margins at 2:00, 8 cm from the nipple in the left breast.  Ultrasound-guided biopsy recommended.    4/24/2023-ultrasound-guided biopsy-pathology consistent with invasive ductal carcinoma, grade 1, associated atypical ductal hyperplasia and focal atypical lobular hyperplasia, negative for lymphovascular space invasion.  ER +% strong, LA +% strong  HER2 negative  Ki-67 10%    5/27/2023-left breast lumpectomy  Pathology consistent with multifocal invasive ductal carcinoma  3 foci each measuring 2 mm  3 sentinel lymph nodes negative  Grade 1  ER/LA strongly positive HER2 negative  Margins negative    Patient started anastrozole in May 2023.      Interval history  The patient returns today for follow up.  She was seen in the ED on 11/24/2024 for dizziness and lightheadedness.  Cardiac workup performed and negative.  She was advised to hold her anastrozole and antihypertensives to see if symptoms improved.  Is now been holding anastrozole for 1 week, and reports that her dizziness has resolved and her blood pressure has normalized, though she has also been holding her antihypertensives.  She returns next week to see her PCP who will reevaluate symptoms.  Dr. Stauffer also recommended holding anastrozole for 2-3 weeks and reevaluating symptoms.    While on anastrozole, patient denies any difficulties with hot flashes or arthralgias.  She was told by the  ED physician that she might be experiencing anxiety, however she reports that her mood is doing great and she does not think she has anxiety.  Doing monthly self breast exams at home and denies any breast mass.    The following portions of the patient's history were reviewed and updated as appropriate: allergies, current medications, past family history, past medical history, past social history, past surgical history and problem list.    Past Medical History:   Diagnosis Date    Agatston CAC score, <100     2020: 29    Anxiety and depression     Arthritis     Brain aneurysm     FOLLOWED AND THEN RELEASED HER YEARS AGO-NO ISSUES CURRENLTY    Breast cancer in female     LEFT    Frequent urination     Hyperlipidemia     Hypertension     Insomnia     Knee pain, bilateral     AT TIMES    Memory change     SINCE STROKE. UNABLE TO REMEBER LIST OF INSTUCTIONS    Myocardial bridge     very mild, mid-LAD    Osteoarthritis     SOB (shortness of breath) on exertion     Stroke Years ago    About 8 to 10 years ago        Past Surgical History:   Procedure Laterality Date    BLEPHAROPLASTY      BREAST BIOPSY Left 04/24/2023    BREAST LUMPECTOMY WITH SENTINEL NODE BIOPSY Left 5/25/2023    Procedure: left breast Chelsea guided lumpectomy with sentinel lymph node biopsy;  Surgeon: Alissa Allen MD;  Location: [facility] OR [facility];  Service: General;  Laterality: Left;    CARDIAC CATHETERIZATION N/A 05/12/2020    Procedure: Coronary angiography;  Surgeon: Víctor Hernandez MD;  Location: [facility] INVASIVE LOCATION;  Service: Cardiovascular;  Laterality: N/A;    CARDIAC CATHETERIZATION N/A 05/12/2020    Procedure: Left heart cath;  Surgeon: Víctor Hernandez MD;  Location: [facility] CATH INVASIVE LOCATION;  Service: Cardiovascular;  Laterality: N/A;    CARDIAC CATHETERIZATION N/A 05/12/2020    Procedure: Left ventriculography;  Surgeon: Víctor Hernandez MD;  Location: [facility] CATH INVASIVE LOCATION;  Service: Cardiovascular;   Laterality: N/A;    CARDIAC CATHETERIZATION N/A 2020    Procedure: Right heart cath with shunt run;  Surgeon: Víctor Hernandez MD;  Location:  [facility] CATH INVASIVE LOCATION;  Service: Cardiovascular;  Laterality: N/A;    CARPAL TUNNEL RELEASE Right     COLON SURGERY      COLON RESECTION FOR ENDOMETROSIS    COLONOSCOPY      normal less than 10 years ago    EYE SURGERY      Cataracts    HYSTERECTOMY      OOPHORECTOMY      TOE SURGERY Right     right big toe replecemnet 12 years ago    TONSILLECTOMY      TOTAL KNEE ARTHROPLASTY Right 2021    Procedure: TOTAL KNEE ARTHROPLASTY, with left knee steroid injection;  Surgeon: Shamar Boone MD;  Location:  [facility] MAIN OR;  Service: Orthopedics;  Laterality: Right;        Family History   Problem Relation Age of Onset    Breast cancer Mother 76    Arthritis Mother     Hypertension Mother     Heart disease Father         And his father    Heart attack Father     Hyperlipidemia Father     No Known Problems Sister     Heart disease Sister     No Known Problems Brother     Prostate cancer Maternal Grandfather     Heart disease Paternal Grandfather     Heart attack Paternal Grandfather     Malig Hyperthermia Neg Hx         Social History     Socioeconomic History    Marital status: Single    Number of children: 2   Tobacco Use    Smoking status: Former     Current packs/day: 0.00     Average packs/day: 1.5 packs/day for 15.0 years (22.5 ttl pk-yrs)     Types: Cigarettes     Start date: 1964     Quit date: 1977     Years since quittin.9    Smokeless tobacco: Never   Vaping Use    Vaping status: Never Used   Substance and Sexual Activity    Alcohol use: Yes     Comment: Once a month    Drug use: Never    Sexual activity: Not Currently     Partners: Male     Birth control/protection: None        OB History          2    Para   2    Term   2            AB        Living             SAB        IAB        Ectopic        Molar        Multiple         "Live Births                 Age at menarche-14   3 para 2  1  Age at menopause-32    No Known Allergies     Review of systems as mentioned HPI otherwise negative    Objective   Blood pressure 127/79, pulse 59, temperature 98.2 °F (36.8 °C), temperature source Oral, resp. rate 16, height 157.5 cm (62.01\"), weight 71.1 kg (156 lb 11.2 oz), SpO2 98%.     Physical Exam  Vitals reviewed.   Constitutional:       Appearance: Normal appearance. She is normal weight.   HENT:      Head: Normocephalic and atraumatic.      Right Ear: External ear normal.      Left Ear: External ear normal.      Nose: Nose normal.      Mouth/Throat:      Pharynx: Oropharynx is clear.   Eyes:      Extraocular Movements: Extraocular movements intact.      Pupils: Pupils are equal, round, and reactive to light.   Cardiovascular:      Rate and Rhythm: Normal rate.      Pulses: Normal pulses.   Pulmonary:      Effort: Pulmonary effort is normal.   Abdominal:      General: Abdomen is flat.   Musculoskeletal:         General: Normal range of motion.      Cervical back: Normal range of motion.   Skin:     General: Skin is warm.   Neurological:      General: No focal deficit present.      Mental Status: She is alert. Mental status is at baseline.   Psychiatric:         Mood and Affect: Mood normal.         Behavior: Behavior normal.         Thought Content: Thought content normal.         Judgment: Judgment normal.       Patient refused breast exam today: Breast Exam: Right breast appears normal on inspection.  No palpable abnormalities of the right breast.  Left breast incision at left breast upper outer quadrant extending into the left axilla  is healing well.    I have reexamined the patient and the results are consistent with the previously documented exam. JANKI Becerra      Admission on 2024, Discharged on 2024   Component Date Value Ref Range Status    Extra Tube 2024 Hold for add-ons.   Final    Auto " resulted.    Extra Tube 11/24/2024 hold for add-on   Final    Auto resulted    Extra Tube 11/24/2024 Hold for add-ons.   Final    Auto resulted.    Extra Tube 11/24/2024 Hold for add-ons.   Final    Auto resulted    Glucose 11/24/2024 95  70 - 130 mg/dL Final    QT Interval 11/24/2024 443  ms Final    QTC Interval 11/24/2024 450  ms Final    Glucose 11/24/2024 103 (H)  65 - 99 mg/dL Final    BUN 11/24/2024 13  8 - 23 mg/dL Final    Creatinine 11/24/2024 0.92  0.57 - 1.00 mg/dL Final    Sodium 11/24/2024 136  136 - 145 mmol/L Final    Potassium 11/24/2024 4.2  3.5 - 5.2 mmol/L Final    Chloride 11/24/2024 101  98 - 107 mmol/L Final    CO2 11/24/2024 27.4  22.0 - 29.0 mmol/L Final    Calcium 11/24/2024 9.2  8.6 - 10.5 mg/dL Final    BUN/Creatinine Ratio 11/24/2024 14.1  7.0 - 25.0 Final    Anion Gap 11/24/2024 7.6  5.0 - 15.0 mmol/L Final    eGFR 11/24/2024 64.3  >60.0 mL/min/1.73 Final    WBC 11/24/2024 7.04  3.40 - 10.80 10*3/mm3 Final    RBC 11/24/2024 4.09  3.77 - 5.28 10*6/mm3 Final    Hemoglobin 11/24/2024 12.3  12.0 - 15.9 g/dL Final    Hematocrit 11/24/2024 36.9  34.0 - 46.6 % Final    MCV 11/24/2024 90.2  79.0 - 97.0 fL Final    MCH 11/24/2024 30.1  26.6 - 33.0 pg Final    MCHC 11/24/2024 33.3  31.5 - 35.7 g/dL Final    RDW 11/24/2024 13.0  12.3 - 15.4 % Final    RDW-SD 11/24/2024 42.4  37.0 - 54.0 fl Final    MPV 11/24/2024 8.7  6.0 - 12.0 fL Final    Platelets 11/24/2024 209  140 - 450 10*3/mm3 Final    Neutrophil % 11/24/2024 65.1  42.7 - 76.0 % Final    Lymphocyte % 11/24/2024 20.2  19.6 - 45.3 % Final    Monocyte % 11/24/2024 11.4  5.0 - 12.0 % Final    Eosinophil % 11/24/2024 2.3  0.3 - 6.2 % Final    Basophil % 11/24/2024 0.6  0.0 - 1.5 % Final    Immature Grans % 11/24/2024 0.4  0.0 - 0.5 % Final    Neutrophils, Absolute 11/24/2024 4.59  1.70 - 7.00 10*3/mm3 Final    Lymphocytes, Absolute 11/24/2024 1.42  0.70 - 3.10 10*3/mm3 Final    Monocytes, Absolute 11/24/2024 0.80  0.10 - 0.90 10*3/mm3 Final     Eosinophils, Absolute 11/24/2024 0.16  0.00 - 0.40 10*3/mm3 Final    Basophils, Absolute 11/24/2024 0.04  0.00 - 0.20 10*3/mm3 Final    Immature Grans, Absolute 11/24/2024 0.03  0.00 - 0.05 10*3/mm3 Final    nRBC 11/24/2024 0.0  0.0 - 0.2 /100 WBC Final    HS Troponin T 11/24/2024 10  <14 ng/L Final    D-Dimer, Quantitative 11/24/2024 0.34  0.00 - 0.77 MCGFEU/mL Final    proBNP 11/24/2024 252.0  0.0 - 1,800.0 pg/mL Final    HS Troponin T 11/24/2024 12  <14 ng/L Final    Troponin T Delta 11/24/2024 2  >=-4 - <+4 ng/L Final        XR Chest 1 View    Result Date: 11/24/2024  1. Mild cardiomegaly. 2. Lungs appear clear   This report was finalized on 11/24/2024 12:47 PM by Dr. Marcus Taylor M.D on Workstation: [id number]            Assessment & Plan       *Left breast invasive ductal carcinoma  pT1 aN0 M0, stage Ia, grade 1, ER/GA strongly positive, HER2 negative, Ki-67 10%  Given the small size of the tumor and strong ER/GA positivity and her age there is likely not much benefit from radiation.  Discussed referring to radiation of oncology however patient does not want to do so.  Patient started on anastrozole 1 mg p.o. daily and May 2023.  ED visit 11/24/2024 for dizziness, near syncope.  Cardiac workup performed and negative.  She was advised to hold anastrozole and antihypertensives.  12/2/2024: Has now been holding anastrozole x 1 week.  Also holding antihypertensives.  Dizziness has resolved and her blood pressure has improved.  Dr. Stauffer had recommended holding anastrozole x 2-3 weeks and reevaluating.  Patient has follow-up with her PCP next week to reevaluate symptoms.  She will contact us after PCP visit to make decision about resuming anastrozole.   Screening mammogram May 2024 negative  Screening mammogram scheduled May 2025.      *Bone health  2/22/2023-DEXA shows osteopenia with a T score of -2.2 in the right hip and -1.2 in the left hip.  She is tolerating Fosamax well.  Started in May  2023.  Repeat DEXA February 2025.  Ordered today   Continue calcium and vitamin D    *Hypertension-continue hydrochlorothiazide and amlodipine  11/24/2024: ED visit for dizziness and near syncope.  Antihypertensives held.  Blood pressure BP: 127/79     *Hyperlipidemia-continue atorvastatin, stable    PLAN:   Anastrozole remains on hold since 11/24/2024.  Planning to hold 2-3 weeks and reevaluate symptoms.  Patient has visit with PCP next week and will contact us after appointment so we can discuss if/when patient is able to resume anastrozole.  Initiated May 2023.  DEXA February 2025, ordered today.    MD 6 months  Mammogram scheduled 5/21/2025    Patient is on medications requiring close monitoring for toxicities.  Review of hospital records.    I spent 35 minutes caring for Joi on this date of service. This time includes time spent by me in the following activities: preparing for the visit, reviewing tests, obtaining and/or reviewing a separately obtained history, performing a medically appropriate examination and/or evaluation, counseling and educating the patient/family/caregiver, documenting information in the medical record, and care coordination.

## 2024-12-02 ENCOUNTER — OFFICE VISIT (OUTPATIENT)
Dept: ONCOLOGY | Facility: CLINIC | Age: 79
End: 2024-12-02
Payer: MEDICARE

## 2024-12-02 VITALS
HEIGHT: 62 IN | DIASTOLIC BLOOD PRESSURE: 79 MMHG | TEMPERATURE: 98.2 F | OXYGEN SATURATION: 98 % | BODY MASS INDEX: 28.84 KG/M2 | HEART RATE: 59 BPM | WEIGHT: 156.7 LBS | SYSTOLIC BLOOD PRESSURE: 127 MMHG | RESPIRATION RATE: 16 BRPM

## 2024-12-02 DIAGNOSIS — M85.852 OSTEOPENIA OF NECK OF LEFT FEMUR: ICD-10-CM

## 2024-12-02 DIAGNOSIS — Z17.0 MALIGNANT NEOPLASM OF UPPER-OUTER QUADRANT OF LEFT BREAST IN FEMALE, ESTROGEN RECEPTOR POSITIVE: Primary | ICD-10-CM

## 2024-12-02 DIAGNOSIS — C50.412 MALIGNANT NEOPLASM OF UPPER-OUTER QUADRANT OF LEFT BREAST IN FEMALE, ESTROGEN RECEPTOR POSITIVE: Primary | ICD-10-CM

## 2024-12-02 DIAGNOSIS — M85.80 OSTEOPENIA, UNSPECIFIED LOCATION: ICD-10-CM

## 2024-12-02 PROCEDURE — 1126F AMNT PAIN NOTED NONE PRSNT: CPT | Performed by: NURSE PRACTITIONER

## 2024-12-02 PROCEDURE — 99214 OFFICE O/P EST MOD 30 MIN: CPT | Performed by: NURSE PRACTITIONER

## 2024-12-02 PROCEDURE — 1159F MED LIST DOCD IN RCRD: CPT | Performed by: NURSE PRACTITIONER

## 2024-12-02 PROCEDURE — 3074F SYST BP LT 130 MM HG: CPT | Performed by: NURSE PRACTITIONER

## 2024-12-02 PROCEDURE — 1160F RVW MEDS BY RX/DR IN RCRD: CPT | Performed by: NURSE PRACTITIONER

## 2024-12-02 PROCEDURE — 3078F DIAST BP <80 MM HG: CPT | Performed by: NURSE PRACTITIONER

## 2024-12-11 RX ORDER — LETROZOLE 2.5 MG/1
2.5 TABLET, FILM COATED ORAL DAILY
Qty: 30 TABLET | Refills: 3 | Status: SHIPPED | OUTPATIENT
Start: 2024-12-11

## 2025-01-29 ENCOUNTER — TELEPHONE (OUTPATIENT)
Dept: ORTHOPEDIC SURGERY | Facility: CLINIC | Age: 80
End: 2025-01-29

## 2025-01-29 NOTE — TELEPHONE ENCOUNTER
Caller: Joi Aleman    Relationship: Self    Best call back number: [phone]    What was the call regarding: PT HAD PREVIOUS R KNEE REPLACEMENT WITH DR DE LEON. PT HAS BEEN HAVING SOME RECURRING ISSUES WITH THE R KNEE RECENTLY AND WOULD LIKE TO DISCUSS IT WITH SOMEONE ON CLINICAL. PLEASE CONTACT PT TO DISCUSS.

## 2025-02-03 ENCOUNTER — PATIENT MESSAGE (OUTPATIENT)
Dept: CARDIOLOGY | Facility: CLINIC | Age: 80
End: 2025-02-03
Payer: MEDICARE

## 2025-02-03 DIAGNOSIS — I10 ESSENTIAL HYPERTENSION: Primary | ICD-10-CM

## 2025-02-10 RX ORDER — HYDROCHLOROTHIAZIDE 12.5 MG/1
25 CAPSULE ORAL DAILY
Qty: 90 CAPSULE | Refills: 3 | Status: CANCELLED | OUTPATIENT
Start: 2025-02-10

## 2025-02-10 RX ORDER — HYDROCHLOROTHIAZIDE 12.5 MG/1
25 CAPSULE ORAL DAILY
Qty: 180 CAPSULE | Refills: 0 | Status: SHIPPED | OUTPATIENT
Start: 2025-02-10

## 2025-02-10 NOTE — TELEPHONE ENCOUNTER
I would have her increase the HCTZ to 25 mg.  Lets bring her in for labs and a blood pressure check in 1 week

## 2025-02-10 NOTE — TELEPHONE ENCOUNTER
Discussed with Carmen: Can we arrange for the BMP to be completed in [facility] next week?     TRIAGE: Called Joi Aleman to determine if she would like to have lab work in [facility] before/after her BP check, however there was no answer.  Left voicemail requesting callback.    Notified Ann Marie in [facility] of patient to get lab work on 2/18 around 1130 am.  She verbalized understanding.    HUB: please transfer to Triage if patient returns call    Thank you,  Eli PACHECO RN  Triage Nurse [facility] [place]  02/10/25 14:53 EST

## 2025-02-10 NOTE — TELEPHONE ENCOUNTER
Reviewed recommendations with Joi Aleman and the patient verbalized understanding of the recommendations.    Carmen,    Pended RX for signature.    Please place lab order.    Patient is asking if she can have her labs collected in [facility] when she comes in for her BP check so that she doesn't have to walk over to the main hospital?      Thank you,  Eli PACHECO RN  Triage Nurse [facility] [place]  02/10/25  12:59 EST

## 2025-02-10 NOTE — TELEPHONE ENCOUNTER
Notified patient of recommendations. Patient verbalized understanding.    Emily Awan RN  Triage [facility] [place]

## 2025-02-18 RX ORDER — TAMOXIFEN CITRATE 10 MG/1
5 TABLET ORAL DAILY
Qty: 45 TABLET | Refills: 3 | Status: SHIPPED | OUTPATIENT
Start: 2025-02-18

## 2025-02-24 ENCOUNTER — HOSPITAL ENCOUNTER (OUTPATIENT)
Facility: HOSPITAL | Age: 80
Discharge: HOME | End: 2025-02-24
Admitting: NURSE PRACTITIONER
Payer: MEDICARE

## 2025-02-24 DIAGNOSIS — M85.852 OSTEOPENIA OF NECK OF LEFT FEMUR: ICD-10-CM

## 2025-02-24 DIAGNOSIS — M85.80 OSTEOPENIA, UNSPECIFIED LOCATION: ICD-10-CM

## 2025-02-24 PROCEDURE — 77080 DXA BONE DENSITY AXIAL: CPT

## 2025-02-24 RX ORDER — ALENDRONATE SODIUM 70 MG/1
70 TABLET ORAL
Qty: 12 TABLET | Refills: 3 | Status: SHIPPED | OUTPATIENT
Start: 2025-02-24

## 2025-02-27 ENCOUNTER — TELEPHONE (OUTPATIENT)
Dept: ONCOLOGY | Facility: CLINIC | Age: 80
End: 2025-02-27
Payer: MEDICARE

## 2025-03-25 ENCOUNTER — OFFICE VISIT (OUTPATIENT)
Dept: ORTHOPEDIC SURGERY | Facility: CLINIC | Age: 80
End: 2025-03-25
Payer: MEDICARE

## 2025-03-25 VITALS — BODY MASS INDEX: 28.71 KG/M2 | WEIGHT: 156 LBS | HEIGHT: 62 IN | TEMPERATURE: 98 F

## 2025-03-25 DIAGNOSIS — R52 PAIN: Primary | ICD-10-CM

## 2025-03-25 DIAGNOSIS — M70.50 PES ANSERINE BURSITIS: ICD-10-CM

## 2025-03-25 DIAGNOSIS — Z96.651 STATUS POST RIGHT KNEE REPLACEMENT: ICD-10-CM

## 2025-03-25 PROCEDURE — 99213 OFFICE O/P EST LOW 20 MIN: CPT | Performed by: ORTHOPAEDIC SURGERY

## 2025-03-25 NOTE — PROGRESS NOTES
Patient: Joi lAeman  YOB: 1945 79 y.o. female  Medical Record Number: [id number]    Chief Complaints:   Chief Complaint   Patient presents with    Right Knee - Initial Evaluation       History of Present Illness:Joi Aleman is a 79 y.o. female who presents for follow-up of  right tka -replacement was done about 4 years ago she was doing well into the last couple months where she has developed some intermittent soreness she notices it when she will turn or rotate at night or with certain positions or activities.  She describes as an ache.    Allergies: No Known Allergies    Medications:   Current Outpatient Medications   Medication Sig Dispense Refill    alendronate (Fosamax) 70 MG tablet Take 1 tablet by mouth Every 7 (Seven) Days. For bone health 12 tablet 3    atorvastatin (LIPITOR) 20 MG tablet TAKE 1 TABLET EVERY DAY 90 tablet 0    famotidine (PEPCID) 20 MG tablet Take 1 tablet by mouth.      hydroCHLOROthiazide (MICROZIDE) 12.5 MG capsule Take 2 capsules by mouth Daily. 180 capsule 0    loperamide (IMODIUM) 2 MG capsule TAKE 4 CAPSULES BY MOUTH DAILY. 360 capsule 3    magnesium oxide (MAG-OX) 400 tablet tablet Take 1 tablet by mouth Daily.      oxybutynin (DITROPAN) 5 MG tablet Take 1 tablet by mouth 2 (Two) Times a Day. Urge urination (Patient taking differently: Take 1 tablet by mouth Daily. Urge urination) 180 tablet 2    sertraline (ZOLOFT) 100 MG tablet TAKE 2 TABLETS EVERY  tablet 1    tamoxifen (NOLVADEX) 10 MG tablet Take 0.5 tablets by mouth Daily. 45 tablet 3    traZODone (DESYREL) 100 MG tablet TAKE 1 TABLET EVERY NIGHT 90 tablet 3     No current facility-administered medications for this visit.         The following portions of the patient's history were reviewed and updated as appropriate: allergies, current medications, past family history, past medical history, past social history, past surgical history and problem list.    Review of Systems:   Pertinent  "positives/negative listed in HPI above    Physical Exam:   Vitals:    03/25/25 0909   Temp: 98 °F (36.7 °C)   TempSrc: Temporal   Weight: 70.8 kg (156 lb)   Height: 157.5 cm (62\")   PainSc: 0-No pain   PainLoc: Knee       General: A and O x 3, ASA, NAD      Knee Exam List: Knee:  right    ALIGNMENT:     Neutral  ,   Patella tracks   midline    GAIT:     Nonantalgic    SKIN:    N healed midline incision    RANGE OF MOTION:   0  -  135   DEG    STRENGTH:   5 / 5    LIGAMENTS:    No varus / valgus instability.   Negative  Lachman.    MENISCUS:     Negative   Virgie       DISTAL PULSES:    Paplable    DISTAL SENSATION :   Intact    LYMPHATICS:     No   lymphadenopathy    OTHER:          - No  effusion      - No crepitance with ROM      +Swelling and tenderness to palpation pes anserine bursa     Radiology:  Xrays 3views right (ap,lateral, sunrise) were ordered and reviewed for evaluation of knee pain demonstratinga well positioned knee replacement without evidence of wear, loosening or osteolysis  todays xrays were compared to previous xrays and demonstrate no change    Assessment/Plan:  Right total knee replacement radiographically and structurally everything looks and feels fine.  I think she does have some Pez anserine bursitis have given her a prescription for an NSAID gel to apply twice a day she will give that a try I also recommended therapy she like to hold off.  If she does not improve she will call back.      Diagnoses and all orders for this visit:    1. Pain (Primary)  -     XR Knee 3 View Right        Shamar Boone MD  3/25/2025  "

## 2025-05-01 ENCOUNTER — TELEPHONE (OUTPATIENT)
Dept: CARDIOLOGY | Age: 80
End: 2025-05-01
Payer: MEDICARE

## 2025-05-01 NOTE — TELEPHONE ENCOUNTER
Agree with plan of care.  If she has any worsening symptoms, she would need to present to the ED or if she starts having active chest pain that is not going away.  She can increase her hydrochlorothiazide to 1 full tablet daily (25 mg) to see if that helps with hypertension.  Thank you

## 2025-05-01 NOTE — TELEPHONE ENCOUNTER
Patient called because she is having some occasional chest pressure, but it is relieved when she goes to the restroom. She also states her SBP has been going to the 150s and she feels weak. She was last seen on 6/25/24. I went ahead and scheduled her to see Megan on 5/5 and told her she should go to the ER if her symptoms worsen. She verbalized understanding.     Emily Awan RN  Triage [facility]

## 2025-05-02 NOTE — TELEPHONE ENCOUNTER
Notified patient of recommendations. Patient states she is already taking 25mg of hydrochlorothiazide daily. Twice now she has had to take an additional 12.5mg of hydrochlorothiazide which seemed to help.     Emily Awan RN  Triage [facility]

## 2025-05-05 ENCOUNTER — HOSPITAL ENCOUNTER (OUTPATIENT)
Dept: GENERAL RADIOLOGY | Facility: HOSPITAL | Age: 80
Discharge: HOME | End: 2025-05-05
Payer: MEDICARE

## 2025-05-05 ENCOUNTER — TELEPHONE (OUTPATIENT)
Dept: CARDIOLOGY | Age: 80
End: 2025-05-05
Payer: MEDICARE

## 2025-05-05 ENCOUNTER — OFFICE VISIT (OUTPATIENT)
Dept: CARDIOLOGY | Age: 80
End: 2025-05-05
Payer: MEDICARE

## 2025-05-05 ENCOUNTER — LAB (OUTPATIENT)
Dept: LAB | Facility: HOSPITAL | Age: 80
End: 2025-05-05
Payer: MEDICARE

## 2025-05-05 VITALS
SYSTOLIC BLOOD PRESSURE: 118 MMHG | HEIGHT: 62 IN | OXYGEN SATURATION: 98 % | HEART RATE: 57 BPM | WEIGHT: 155 LBS | DIASTOLIC BLOOD PRESSURE: 68 MMHG | BODY MASS INDEX: 28.52 KG/M2

## 2025-05-05 DIAGNOSIS — I10 ESSENTIAL HYPERTENSION: ICD-10-CM

## 2025-05-05 DIAGNOSIS — Q24.5 MYOCARDIAL BRIDGE: ICD-10-CM

## 2025-05-05 DIAGNOSIS — R06.02 SHORTNESS OF BREATH: ICD-10-CM

## 2025-05-05 DIAGNOSIS — E78.2 MIXED HYPERLIPIDEMIA: ICD-10-CM

## 2025-05-05 DIAGNOSIS — R06.02 SHORTNESS OF BREATH: Primary | ICD-10-CM

## 2025-05-05 DIAGNOSIS — I10 PRIMARY HYPERTENSION: ICD-10-CM

## 2025-05-05 DIAGNOSIS — R07.2 PRECORDIAL PAIN: ICD-10-CM

## 2025-05-05 DIAGNOSIS — Q24.5 CORONARY SINUS ABNORMALITY: ICD-10-CM

## 2025-05-05 DIAGNOSIS — R93.1 AGATSTON CAC SCORE, <100: ICD-10-CM

## 2025-05-05 PROBLEM — Z12.11 COLON CANCER SCREENING: Status: RESOLVED | Noted: 2023-02-15 | Resolved: 2025-05-05

## 2025-05-05 PROBLEM — R10.31 RIGHT GROIN PAIN: Status: RESOLVED | Noted: 2017-04-12 | Resolved: 2025-05-05

## 2025-05-05 LAB
NT-PROBNP SERPL-MCNC: 296 PG/ML (ref 0–1800)
TROPONIN T SERPL HS-MCNC: 11 NG/L

## 2025-05-05 PROCEDURE — 1160F RVW MEDS BY RX/DR IN RCRD: CPT | Performed by: NURSE PRACTITIONER

## 2025-05-05 PROCEDURE — 36415 COLL VENOUS BLD VENIPUNCTURE: CPT

## 2025-05-05 PROCEDURE — 3078F DIAST BP <80 MM HG: CPT | Performed by: NURSE PRACTITIONER

## 2025-05-05 PROCEDURE — 1159F MED LIST DOCD IN RCRD: CPT | Performed by: NURSE PRACTITIONER

## 2025-05-05 PROCEDURE — 99214 OFFICE O/P EST MOD 30 MIN: CPT | Performed by: NURSE PRACTITIONER

## 2025-05-05 PROCEDURE — 3074F SYST BP LT 130 MM HG: CPT | Performed by: NURSE PRACTITIONER

## 2025-05-05 PROCEDURE — 71046 X-RAY EXAM CHEST 2 VIEWS: CPT

## 2025-05-05 PROCEDURE — 93000 ELECTROCARDIOGRAM COMPLETE: CPT | Performed by: NURSE PRACTITIONER

## 2025-05-05 PROCEDURE — 83880 ASSAY OF NATRIURETIC PEPTIDE: CPT

## 2025-05-05 PROCEDURE — 84484 ASSAY OF TROPONIN QUANT: CPT

## 2025-05-05 RX ORDER — AMLODIPINE BESYLATE 5 MG/1
5 TABLET ORAL DAILY
COMMUNITY

## 2025-05-05 NOTE — PROGRESS NOTES
Date of Office Visit: 2025  Encounter Provider: JANKI Gamino  Place of Service: [facility] CARDIOLOGY  Patient Name: Joi Aleman  :1945  Primary Cardiologist: Dr. Thomas Estrada    Chief Complaint   Patient presents with    Chest Pain    Hypertension   :     HPI: Joi Aleman is a 79 y.o. female who presents today for follow-up on chest pain and hypertension.  She is a new patient to me and I have reviewed her medical records.    She has known hypertension, hyperlipidemia, GERD, and brain aneurysm.  In , she was diagnosed with ER/OR positive HER2/emely negative breast cancer.  She underwent successful lumpectomy placed on anastrozole.    She has a known heart murmur.  In May 2020, was diagnosed with normal LVEF, mild to moderate mitral valve prolapse, mild to moderate mitral regurgitation, moderate tricuspid regurgitation, and pulmonary hypertension per TTE and RICARDA.  Coronary CT angiogram and right and left heart catheterization showed normal luminal regularities of the coronary arteries, very mild LAD bridging, normal pressures, mild RA/RV dilation, Agatston score of 29, and dilated coronary sinus.  She was started on metoprolol.    In 2024, she saw a JANKI Marcelo for profound fatigue, shortness of breath, lightheadedness, and generalized weakness.  In 2024, echocardiogram showed normal LVEF, mild LVH, biatrial dilation, mild aortic regurgitation, mild to moderate mitral regurgitation, and mild tricuspid regurgitation.  Lexiscan myocardial perfusion study was normal.    She recently contacted our office stating that she was having chest pressure that resolves when she urinates..  She reported weakness and systolic blood pressure in the 150s.  She was recommended to go to the ED for active chest pain and to increase her HCTZ to 1 full tablet daily.    She presents today for a follow-up visit.  She says she thinks she may have pneumonia.   She has been experiencing intermittent chest pain, shortness of breath, generalized weakness, nausea, and lightheadedness upon standing.  She said her children told her to go to the ED, but she did not want to go.  She did not think about going to the urgent care center.  She has been feeling better each day, but she still has intermittent symptoms.  Her blood pressure was high so she started taking 2 tablets of the hydrochlorothiazide.  Blood pressure is better today.      Past Medical History:   Diagnosis Date    Agatston CAC score, <100     2020: 29    Aneurysm     Anxiety and depression     Arthritis     For years    Brain aneurysm     FOLLOWED AND THEN RELEASED HER YEARS AGO-NO ISSUES CURRENLTY    Breast cancer in female     LEFT    Cataract     Both eyes fixed    Frequent urination     GERD (gastroesophageal reflux disease)     Off and on for years    Heart murmur     From birth    Hyperlipidemia     Yes    Hypertension     Yes    Insomnia     Irritable bowel syndrome     Diarrhea.  35 years    Knee pain, bilateral     AT TIMES    Knee swelling ?    Memory change     SINCE STROKE. UNABLE TO REMEBER LIST OF INSTUCTIONS    Myocardial bridge     very mild, mid-LAD    Obesity     20 years    Osteoarthritis     SOB (shortness of breath) on exertion     Stroke Years ago    About 8 to 10 years ago    Visual impairment     Not anymore       Past Surgical History:   Procedure Laterality Date    BLEPHAROPLASTY      BREAST BIOPSY Left 04/24/2023    BREAST LUMPECTOMY WITH SENTINEL NODE BIOPSY Left 05/25/2023    Procedure: left breast Chelsea guided lumpectomy with sentinel lymph node biopsy;  Surgeon: Alissa Allen MD;  [place] [facility] [place] [facility] [place]  Service: General;  Laterality: Left;    CARDIAC CATHETERIZATION N/A 05/12/2020    Procedure: Coronary angiography;  Surgeon: Víctor Hernandez MD;  Location: [facility] CATH INVASIVE LOCATION;  Service: Cardiovascular;  Laterality: N/A;    CARDIAC CATHETERIZATION N/A 05/12/2020     Procedure: Left heart cath;  Surgeon: Víctor Hernandez MD;  Location: [facility] CATH INVASIVE LOCATION;  Service: Cardiovascular;  Laterality: N/A;    CARDIAC CATHETERIZATION N/A 2020    Procedure: Left ventriculography;  Surgeon: Víctor Hernandez MD;  Location: [facility] CATH INVASIVE LOCATION;  Service: Cardiovascular;  Laterality: N/A;    CARDIAC CATHETERIZATION N/A 2020    Procedure: Right heart cath with shunt run;  Surgeon: Víctor Hernandez MD;  Location: [facility] CATH INVASIVE LOCATION;  Service: Cardiovascular;  Laterality: N/A;    CARPAL TUNNEL RELEASE Right     COLON SURGERY      COLON RESECTION FOR ENDOMETROSIS    COLONOSCOPY      normal less than 10 years ago    EYE SURGERY      Cataracts    FOOT SURGERY  Years ago    New joint in right big toe    HYSTERECTOMY      About 45 years ago    JOINT REPLACEMENT      Right knee    OOPHORECTOMY      TOE SURGERY Right     right big toe replecemnet 12 years ago    TONSILLECTOMY      70 years ago    TOTAL KNEE ARTHROPLASTY Right 2021    Procedure: TOTAL KNEE ARTHROPLASTY, with left knee steroid injection;  Surgeon: Shamar Boone MD;  Location: [facility] OR;  Service: Orthopedics;  Laterality: Right;       Social History     Socioeconomic History    Marital status: Single    Number of children: 2   Tobacco Use    Smoking status: Former     Current packs/day: 0.00     Average packs/day: 1.5 packs/day for 15.0 years (22.5 ttl pk-yrs)     Types: Cigarettes     Start date: 1964     Quit date: 1977     Years since quittin.3     Passive exposure: Past    Smokeless tobacco: Never   Vaping Use    Vaping status: Never Used   Substance and Sexual Activity    Alcohol use: Not Currently    Drug use: Never    Sexual activity: Not Currently     Partners: Male     Birth control/protection: None       Family History   Problem Relation Age of Onset    Breast cancer Mother 76    Arthritis Mother     Hypertension Mother     Vision loss Mother      Cancer Mother         And her dad    Osteoporosis Mother     Heart disease Father         And his father    Heart attack Father     Hyperlipidemia Father     No Known Problems Sister     Heart disease Sister     No Known Problems Brother     Prostate cancer Maternal Grandfather     Cancer Maternal Grandfather         Oo    Heart disease Paternal Grandfather     Heart attack Paternal Grandfather     Malig Hyperthermia Neg Hx        The following portion of the patient's history were reviewed and updated as appropriate: past medical history, past surgical history, past social history, past family history, allergies, current medications, and problem list.    Review of Systems   Constitutional: Positive for malaise/fatigue.   Cardiovascular:  Positive for chest pain and dyspnea on exertion.   Respiratory:  Positive for shortness of breath.    Neurological:  Positive for light-headedness and weakness.       No Known Allergies      Current Outpatient Medications:     alendronate (Fosamax) 70 MG tablet, Take 1 tablet by mouth Every 7 (Seven) Days. For bone health, Disp: 12 tablet, Rfl: 3    amLODIPine (NORVASC) 5 MG tablet, Take 1 tablet by mouth Daily., Disp: , Rfl:     atorvastatin (LIPITOR) 20 MG tablet, TAKE 1 TABLET EVERY DAY, Disp: 90 tablet, Rfl: 0    hydroCHLOROthiazide (MICROZIDE) 12.5 MG capsule, Take 2 capsules by mouth Daily., Disp: 180 capsule, Rfl: 0    loperamide (IMODIUM) 2 MG capsule, TAKE 4 CAPSULES BY MOUTH DAILY., Disp: 360 capsule, Rfl: 3    magnesium oxide (MAG-OX) 400 tablet tablet, Take 1 tablet by mouth Daily., Disp: , Rfl:     oxybutynin (DITROPAN) 5 MG tablet, Take 1 tablet by mouth 2 (Two) Times a Day. Urge urination (Patient taking differently: Take 1 tablet by mouth Daily. Urge urination), Disp: 180 tablet, Rfl: 2    sertraline (ZOLOFT) 100 MG tablet, TAKE 2 TABLETS EVERY DAY, Disp: 180 tablet, Rfl: 1    tamoxifen (NOLVADEX) 10 MG tablet, Take 0.5 tablets by mouth Daily., Disp: 45 tablet, Rfl:  "3    traZODone (DESYREL) 100 MG tablet, TAKE 1 TABLET EVERY NIGHT, Disp: 90 tablet, Rfl: 3         Objective:     Vitals:    05/05/25 1512   BP: 118/68   BP Location: Left arm   Patient Position: Sitting   Cuff Size: Adult   Pulse: 57   SpO2: 98%   Weight: 70.3 kg (155 lb)   Height: 157.5 cm (62.01\")     Body mass index is 28.34 kg/m².    PHYSICAL EXAM:    Vitals Reviewed.   General Appearance: No acute distress, well developed and well nourished.   HENT: No hearing loss noted.    Respiratory: No signs of respiratory distress. Respiration rhythm and depth normal.  Clear and faint breath sounds.  Cardiovascular:  Jugular Venous Pressure: Normal  Heart Rate and Rhythm: Normal, Heart Sounds: Normal S1 and S2. No S3 or S4 noted.  Murmurs: No murmurs noted. No rubs, thrills, or gallops.   Lower Extremities: No edema noted.  Musculoskeletal: Normal movement of extremities.  Skin: General appearance normal.    Psychiatric: Patient alert and oriented to person, place, and time. Speech and behavior appropriate. Normal mood and affect.       ECG 12 Lead    Date/Time: 5/5/2025 3:14 PM  Performed by: Megan Acosta APRN    Authorized by: Megan Acosta APRN  Comparison: compared with previous ECG from 11/24/2024  Similar to previous ECG  Rhythm: sinus bradycardia  Rate: bradycardic  BPM: 57  Conduction: 1st degree AV block  ST Segments: ST segments normal  T Waves: T waves normal  QRS axis: normal    Clinical impression: non-specific ECG            Assessment:       Diagnosis Plan   1. Shortness of breath  proBNP    High Sensitivity Troponin T    XR Chest 2 View      2. Precordial pain  proBNP    High Sensitivity Troponin T      3. Essential hypertension  proBNP    High Sensitivity Troponin T      4. Myocardial bridge  proBNP    High Sensitivity Troponin T      5. Coronary sinus abnormality  proBNP    High Sensitivity Troponin T      6. Agatston CAC score, <100  proBNP    High Sensitivity Troponin T      7. Mixed " "hyperlipidemia  proBNP    High Sensitivity Troponin T             Plan:       Last week, she started experiencing shortness of breath, chest pain, and generalized weakness.  She noticed that her blood pressure was elevated and she took 2 tablets of her hydrochlorothiazide.  Her symptoms have improved, but not resolved completely.  She mentioned that she thinks she has \"pneumonia\".    Cardiac testing in 2024 was stable.  I reviewed her recent blood work from April.  EKG shows normal sinus rhythm with first-degree AV block I will check a proBNP, troponin level, and chest x-ray.  She said she may go to an urgent care center.  Blood pressure looks great today.    As always, it has been a pleasure to participate in your patient's care. Thank you.         Sincerely,         JANKI Welch  [facility] Cardiology      Dictated utilizing Dragon Dictation  I spent 34 minutes reviewing her medical records/testing/previous office notes/labs, face-to-face interaction with patient, physical examination, formulating the plan of care, and discussion of plan of care with patient.     "

## 2025-05-05 NOTE — TELEPHONE ENCOUNTER
Harley ([facility] imaging, [phone]) called regarding Joi KYLER Aleman.  Patient is in facility and states she was also suppose to have a CXR, however there are no orders.    Discussed with JANKI Acosta: will place orders    Returned call to [facility] and he confirmed that he can now see order for CXR.  No further action needed at this time.    Thank you,  Eli PACHECO RN  Triage Nurse [facility]  05/05/25 16:23 EDT

## 2025-05-21 ENCOUNTER — HOSPITAL ENCOUNTER (OUTPATIENT)
Dept: MAMMOGRAPHY | Facility: HOSPITAL | Age: 80
Discharge: HOME | End: 2025-05-21
Admitting: INTERNAL MEDICINE
Payer: MEDICARE

## 2025-05-21 DIAGNOSIS — Z12.31 SCREENING MAMMOGRAM, ENCOUNTER FOR: ICD-10-CM

## 2025-05-21 DIAGNOSIS — C50.412 MALIGNANT NEOPLASM OF UPPER-OUTER QUADRANT OF LEFT BREAST IN FEMALE, ESTROGEN RECEPTOR POSITIVE: ICD-10-CM

## 2025-05-21 DIAGNOSIS — Z17.0 MALIGNANT NEOPLASM OF UPPER-OUTER QUADRANT OF LEFT BREAST IN FEMALE, ESTROGEN RECEPTOR POSITIVE: ICD-10-CM

## 2025-05-21 PROCEDURE — 77067 SCR MAMMO BI INCL CAD: CPT

## 2025-05-21 PROCEDURE — 77063 BREAST TOMOSYNTHESIS BI: CPT

## 2025-06-16 ENCOUNTER — OFFICE VISIT (OUTPATIENT)
Dept: ONCOLOGY | Facility: CLINIC | Age: 80
End: 2025-06-16
Payer: MEDICARE

## 2025-06-16 VITALS
RESPIRATION RATE: 16 BRPM | OXYGEN SATURATION: 97 % | DIASTOLIC BLOOD PRESSURE: 66 MMHG | HEART RATE: 58 BPM | HEIGHT: 62 IN | TEMPERATURE: 98.1 F | WEIGHT: 152.2 LBS | BODY MASS INDEX: 28.01 KG/M2 | SYSTOLIC BLOOD PRESSURE: 146 MMHG

## 2025-06-16 DIAGNOSIS — Z17.0 MALIGNANT NEOPLASM OF UPPER-OUTER QUADRANT OF LEFT BREAST IN FEMALE, ESTROGEN RECEPTOR POSITIVE: Primary | ICD-10-CM

## 2025-06-16 DIAGNOSIS — M85.80 OSTEOPENIA, UNSPECIFIED LOCATION: ICD-10-CM

## 2025-06-16 DIAGNOSIS — C50.412 MALIGNANT NEOPLASM OF UPPER-OUTER QUADRANT OF LEFT BREAST IN FEMALE, ESTROGEN RECEPTOR POSITIVE: Primary | ICD-10-CM

## 2025-06-16 RX ORDER — FAMOTIDINE 20 MG/1
20 TABLET, FILM COATED ORAL 2 TIMES DAILY
COMMUNITY
Start: 2025-06-11

## 2025-06-16 NOTE — PROGRESS NOTES
Subjective   Joi Aleman is a 79 y.o. female.  Referred by Dr. Allen for left breast invasive ductal carcinoma.    History of Present Illness   Ms. Aleman is a 77-year-old lady with hypertension, hyperlipidemia presented with a screen detected abnormality of the left breast.    2/22/2023-bilateral screening mammogram  Left breast focal asymmetry with questioned architectural distortion.  No mammographic evidence of malignancy in the right breast.    3/29/2023-left breast diagnostic mammogram and ultrasound  There is a 0.4 cm hypoechoic mass with ill-defined margins at 2:00, 8 cm from the nipple in the left breast.  Ultrasound-guided biopsy recommended.    4/24/2023-ultrasound-guided biopsy-pathology consistent with invasive ductal carcinoma, grade 1, associated atypical ductal hyperplasia and focal atypical lobular hyperplasia, negative for lymphovascular space invasion.  ER +% strong, DC +% strong  HER2 negative  Ki-67 10%    5/27/2023-left breast lumpectomy  Pathology consistent with multifocal invasive ductal carcinoma  3 foci each measuring 2 mm  3 sentinel lymph nodes negative  Grade 1  ER/DC strongly positive HER2 negative  Margins negative    Patient started anastrozole in May 2023.     She was seen in the ED on 11/24/2024 for dizziness and lightheadedness.  Cardiac workup performed and negative.  She was advised to hold her anastrozole and antihypertensives to see if symptoms improved.  Is now been holding anastrozole for 1 week, and reports that her dizziness has resolved and her blood pressure has normalized, though she has also been holding her antihypertensives.    Subsequently treatment changed to tamoxifen 5 mg daily.      Interval history  Patient returns today for follow-up.  She reports feeling well with no new complaints.  Denies any new breast masses.  She had a breast exam performed by her primary care last month and she is refusing a breast exam today.  Denies any new bone pains,  cough, abdominal pain nausea vomiting.    The following portions of the patient's history were reviewed and updated as appropriate: allergies, current medications, past family history, past medical history, past social history, past surgical history and problem list.    Past Medical History:   Diagnosis Date    Agatston CAC score, <100     2020: 29    Aneurysm     Anxiety and depression     Arthritis     For years    Brain aneurysm     FOLLOWED AND THEN RELEASED HER YEARS AGO-NO ISSUES CURRENLTY    Breast cancer in female     LEFT    Cataract     Both eyes fixed    Frequent urination     GERD (gastroesophageal reflux disease)     Off and on for years    Heart murmur     From birth    Hyperlipidemia     Yes    Hypertension     Yes    Insomnia     Irritable bowel syndrome     Diarrhea.  35 years    Knee pain, bilateral     AT TIMES    Knee swelling ?    Memory change     SINCE STROKE. UNABLE TO REMEBER LIST OF INSTUCTIONS    Myocardial bridge     very mild, mid-LAD    Obesity     20 years    Osteoarthritis     SOB (shortness of breath) on exertion     Stroke Years ago    About 8 to 10 years ago    Visual impairment     Not anymore        Past Surgical History:   Procedure Laterality Date    BLEPHAROPLASTY      BREAST BIOPSY Left 04/24/2023    BREAST LUMPECTOMY WITH SENTINEL NODE BIOPSY Left 05/25/2023    Procedure: left breast Chelsea guided lumpectomy with sentinel lymph node biopsy;  Surgeon: Alissa Allen MD;  [place] [facility] [place] [facility] [place]  Service: General;  Laterality: Left;    CARDIAC CATHETERIZATION N/A 05/12/2020    Procedure: Coronary angiography;  Surgeon: Víctor Hernandez MD;  Location: [facility] CATH INVASIVE LOCATION;  Service: Cardiovascular;  Laterality: N/A;    CARDIAC CATHETERIZATION N/A 05/12/2020    Procedure: Left heart cath;  Surgeon: Víctor Hernandez MD;  Location: [facility] CATH INVASIVE LOCATION;  Service: Cardiovascular;  Laterality: N/A;    CARDIAC CATHETERIZATION N/A 05/12/2020    Procedure: Left  ventriculography;  Surgeon: Víctor Hernandez MD;  Location:  [facility] CATH INVASIVE LOCATION;  Service: Cardiovascular;  Laterality: N/A;    CARDIAC CATHETERIZATION N/A 2020    Procedure: Right heart cath with shunt run;  Surgeon: Víctor Hernandez MD;  Location:  [facility] CATH INVASIVE LOCATION;  Service: Cardiovascular;  Laterality: N/A;    CARPAL TUNNEL RELEASE Right     COLON SURGERY      COLON RESECTION FOR ENDOMETROSIS    COLONOSCOPY      normal less than 10 years ago    EYE SURGERY      Cataracts    FOOT SURGERY  Years ago    New joint in right big toe    HYSTERECTOMY      About 45 years ago    JOINT REPLACEMENT      Right knee    OOPHORECTOMY      TOE SURGERY Right     right big toe replecemnet 12 years ago    TONSILLECTOMY      70 years ago    TOTAL KNEE ARTHROPLASTY Right 2021    Procedure: TOTAL KNEE ARTHROPLASTY, with left knee steroid injection;  Surgeon: Shamar Boone MD;  Location:  [facility];  Service: Orthopedics;  Laterality: Right;        Family History   Problem Relation Age of Onset    Breast cancer Mother 76    Arthritis Mother     Hypertension Mother     Vision loss Mother     Cancer Mother         And her dad    Osteoporosis Mother     Heart disease Father         And his father    Heart attack Father     Hyperlipidemia Father     No Known Problems Sister     Heart disease Sister     No Known Problems Brother     Prostate cancer Maternal Grandfather     Cancer Maternal Grandfather         Oo    Heart disease Paternal Grandfather     Heart attack Paternal Grandfather     Malig Hyperthermia Neg Hx         Social History     Socioeconomic History    Marital status: Single    Number of children: 2   Tobacco Use    Smoking status: Former     Current packs/day: 0.00     Average packs/day: 1.5 packs/day for 15.0 years (22.5 ttl pk-yrs)     Types: Cigarettes     Start date: 1964     Quit date: 1977     Years since quittin.4     Passive exposure: Past    Smokeless  "tobacco: Never   Vaping Use    Vaping status: Never Used   Substance and Sexual Activity    Alcohol use: Not Currently    Drug use: Never    Sexual activity: Not Currently     Partners: Male     Birth control/protection: None        OB History          2    Para   2    Term   2            AB        Living             SAB        IAB        Ectopic        Molar        Multiple        Live Births                 Age at menarche-14   3 para 2  1  Age at menopause-32    No Known Allergies     Review of systems as mentioned HPI otherwise negative    Objective   Blood pressure 146/66, pulse 58, temperature 98.1 °F (36.7 °C), temperature source Oral, resp. rate 16, height 157.5 cm (62.01\"), weight 69 kg (152 lb 3.2 oz), SpO2 97%.     Physical Exam  Vitals reviewed.   Constitutional:       Appearance: Normal appearance. She is normal weight.   HENT:      Head: Normocephalic and atraumatic.      Right Ear: External ear normal.      Left Ear: External ear normal.      Nose: Nose normal.      Mouth/Throat:      Pharynx: Oropharynx is clear.   Eyes:      Extraocular Movements: Extraocular movements intact.      Pupils: Pupils are equal, round, and reactive to light.   Cardiovascular:      Rate and Rhythm: Normal rate.      Pulses: Normal pulses.   Pulmonary:      Effort: Pulmonary effort is normal.   Abdominal:      General: Abdomen is flat.   Musculoskeletal:         General: Normal range of motion.      Cervical back: Normal range of motion.   Skin:     General: Skin is warm.   Neurological:      General: No focal deficit present.      Mental Status: She is alert. Mental status is at baseline.   Psychiatric:         Mood and Affect: Mood normal.         Behavior: Behavior normal.         Thought Content: Thought content normal.         Judgment: Judgment normal.       Patient refused breast exam today: Breast Exam: Right breast appears normal on inspection.  No palpable abnormalities of the right " breast.  Left breast incision at left breast upper outer quadrant extending into the left axilla  is healing well.(Breast exam not performed today)    I have reexamined the patient and the results are consistent with the previously documented exam. Vivian Stauffer MD      No visits with results within 30 Day(s) from this visit.   Latest known visit with results is:   Lab on 05/05/2025   Component Date Value Ref Range Status    proBNP 05/05/2025 296.0  0.0 - 1,800.0 pg/mL Final    HS Troponin T 05/05/2025 11  <14 ng/L Final        Mammo Screening Digital Tomosynthesis Bilateral With CAD  Result Date: 5/21/2025   1. No mammographic evidence of malignancy in either breast.  2. Recommend bilateral screening mammography in 1 year.  BI-RADS Category 2: Benign findings.    This report was finalized on 5/21/2025 8:45 PM by Dr. Garett Cole M.D on Workstation: [id number]             Assessment & Plan       *Left breast invasive ductal carcinoma  pT1 aN0 M0, stage Ia, grade 1, ER/OR strongly positive, HER2 negative, Ki-67 10%  Given the small size of the tumor and strong ER/OR positivity and her age there is likely not much benefit from radiation.  Discussed referring to radiation of oncology however patient does not want to do so.  Patient started on anastrozole 1 mg p.o. daily and May 2023.  ED visit 11/24/2024 for dizziness, near syncope.  Cardiac workup performed and negative.  She was advised to hold anastrozole and antihypertensives.  12/2/2024: Has now been holding anastrozole x 1 week.  Also holding antihypertensives.  Dizziness has resolved and her blood pressure has improved.  Dr. Stauffer had recommended holding anastrozole x 2-3 weeks and reevaluating.  Patient has follow-up with her PCP next week to reevaluate symptoms.  She will contact us after PCP visit to make decision about resuming anastrozole.   Anastrozole discontinued and subsequently started on tamoxifen 5 mg daily  5/21/2025-bilateral screening  mammogram  Continue endocrine therapy to complete 5 years of treatment  No evidence of recurrent disease    *Bone health  2/22/2023-DEXA shows osteopenia with a T score of -2.2 in the right hip and -1.2 in the left hip.  She is tolerating Fosamax well.  Started in May 2023.  2/24/2025-DEXA scan shows normal bone density.  Continue calcium and vitamin D    *Hypertension-continue hydrochlorothiazide and amlodipine  11/24/2024: ED visit for dizziness and near syncope.  Antihypertensives held.  Blood pressure BP: 146/66     *Hyperlipidemia-continue atorvastatin, stable    PLAN:   Discontinued anastrozole in November 2024 and treatment was changed to tamoxifen 5 mg daily  Shows normal bone density with increase in bone density.  Patient could discontinue alendronate particularly because she is on tamoxifen now.  APRN in 6 months and MD in 1 year    Patient is on medications requiring close monitoring for toxicities.  Review of hospital records.

## 2025-08-12 ENCOUNTER — OFFICE VISIT (OUTPATIENT)
Dept: ORTHOPEDIC SURGERY | Facility: CLINIC | Age: 80
End: 2025-08-12
Payer: MEDICARE

## 2025-08-12 VITALS — WEIGHT: 150 LBS | TEMPERATURE: 97.8 F | BODY MASS INDEX: 27.6 KG/M2 | HEIGHT: 62 IN

## 2025-08-12 DIAGNOSIS — M17.12 PRIMARY OSTEOARTHRITIS OF LEFT KNEE: ICD-10-CM

## 2025-08-12 DIAGNOSIS — R52 PAIN: Primary | ICD-10-CM

## 2025-08-12 RX ORDER — MELOXICAM 15 MG/1
7.5 TABLET ORAL DAILY PRN
Qty: 14 TABLET | Refills: 0 | Status: SHIPPED | OUTPATIENT
Start: 2025-08-12

## 2025-08-12 RX ORDER — ALBUTEROL SULFATE 90 UG/1
2 INHALANT RESPIRATORY (INHALATION)
COMMUNITY
Start: 2025-05-06 | End: 2026-05-06

## (undated) DEVICE — BNDG ELAS ELITE V/CLOSE 6IN 5YD LF STRL

## (undated) DEVICE — TRAP FLD MINIVAC MEGADYNE 100ML

## (undated) DEVICE — NEEDLE, QUINCKE 22GX3.5": Brand: MEDLINE INDUSTRIES, INC.

## (undated) DEVICE — NDL HYPO ECLPS SFTY 22G 1 1/2IN

## (undated) DEVICE — SUT VIC 3/0 SH 27IN J416H

## (undated) DEVICE — CATH VENT MIV RADL PIG ST TIP 5F 110CM

## (undated) DEVICE — GLV SURG SENSICARE PI MIC PF SZ7 LF STRL

## (undated) DEVICE — PREP SOL POVIDONE/IODINE BT 4OZ

## (undated) DEVICE — CVR TRANSD CIV FLX TPR 11.9 TO 3.8X61CM

## (undated) DEVICE — DUAL CUT SAGITTAL BLADE

## (undated) DEVICE — PENCL E/S ULTRAVAC TELESCP NOSE HOLSTR 10FT

## (undated) DEVICE — GW EMR FIX EXCHG J STD .035 3MM 260CM

## (undated) DEVICE — 3M™ IOBAN™ 2 ANTIMICROBIAL INCISE DRAPE 6640EZ: Brand: IOBAN™ 2

## (undated) DEVICE — SHEATH GUIDE SCOUT SURG TPR 8.3TO3.2CM 264CM STRL

## (undated) DEVICE — STCKNT IMPERV 9X36IN STRL

## (undated) DEVICE — SUT MNCRYL PLS ANTIB UD 4/0 PS2 18IN

## (undated) DEVICE — SOL NACL 0.9PCT 100ML SGL

## (undated) DEVICE — RADIFOCUS OPTITORQUE ANGIOGRAPHIC CATHETER: Brand: OPTITORQUE

## (undated) DEVICE — SUT SILK 2/0 FS BLK 18IN 685G

## (undated) DEVICE — SYR LUERLOK 5CC

## (undated) DEVICE — PK CATH CARD 40

## (undated) DEVICE — KT MANIFLD CARDIAC

## (undated) DEVICE — ELECTRD BLD EZ CLN MOD 2.5IN

## (undated) DEVICE — SPNG LAP 18X18IN LF STRL PK/5

## (undated) DEVICE — MEDI-VAC YANKAUER SUCTION HANDLE W/BULBOUS TIP: Brand: CARDINAL HEALTH

## (undated) DEVICE — PK PROC MINOR TOWER 40

## (undated) DEVICE — PREMIUM WET SKIN PREP TRAY: Brand: MEDLINE INDUSTRIES, INC.

## (undated) DEVICE — GLV SURG PREMIERPRO ORTHO LTX PF SZ7.5 BRN

## (undated) DEVICE — APPL CHLORAPREP HI/LITE 26ML ORNG

## (undated) DEVICE — KT DRN EVAC WND PVC PCH WTROC RND 10F400

## (undated) DEVICE — GLIDESHEATH BASIC HYDROPHILIC COATED INTRODUCER SHEATH: Brand: GLIDESHEATH

## (undated) DEVICE — SUT VIC 1 CT1 36IN J947H

## (undated) DEVICE — GLV SURG SENSICARE W/ALOE PF LF 7.5 STRL

## (undated) DEVICE — GLV SURG SENSICARE PI PF LF 7 GRN STRL

## (undated) DEVICE — SUT VIC 0 CT1 36IN J946H

## (undated) DEVICE — STPLR SKIN VISISTAT WD 35CT

## (undated) DEVICE — MAT FLR ABSORBENT LG 4FT 10 2.5FT

## (undated) DEVICE — SYS CLS SKIN PREMIERPRO EXOFINFUSION 22CM

## (undated) DEVICE — UNDERCAST PADDING: Brand: DEROYAL

## (undated) DEVICE — ADHS SKIN SURG TISS VISC PREMIERPRO EXOFIN HI/VISC FAST/DRY

## (undated) DEVICE — APPL DURAPREP IODOPHOR APL 26ML

## (undated) DEVICE — NDL HYPO PRECISIONGLIDE REG 25G 1 1/2

## (undated) DEVICE — BALN PRESS WEDGE 5F 110CM

## (undated) DEVICE — TBG PENCL TELESCP MEGADYNE SMOKE EVAC 10FT

## (undated) DEVICE — GLV SURG SENSICARE W/ALOE PF LF 8 STRL

## (undated) DEVICE — LEGGINGS, PAIR, 31X48, STERILE: Brand: MEDLINE

## (undated) DEVICE — PK KN TOTL 40

## (undated) DEVICE — HI-TORQUE BALANCE MIDDLEWEIGHT GUIDE WIRE .014 STRAIGHT TIP 3.0 CM X 190 CM: Brand: HI-TORQUE BALANCE MIDDLEWEIGHT

## (undated) DEVICE — GLV SURG BIOGEL LTX PF 6 1/2

## (undated) DEVICE — GLIDESHEATH SLENDER STAINLESS STEEL KIT: Brand: GLIDESHEATH SLENDER

## (undated) DEVICE — STERILE PATIENT PROTECTIVE PAD FOR IMP® KNEE POSITIONERS & COHESIVE WRAP (10 / CASE): Brand: DE MAYO KNEE POSITIONER®